# Patient Record
Sex: FEMALE | Race: WHITE | Employment: FULL TIME | URBAN - METROPOLITAN AREA
[De-identification: names, ages, dates, MRNs, and addresses within clinical notes are randomized per-mention and may not be internally consistent; named-entity substitution may affect disease eponyms.]

---

## 2017-02-08 ENCOUNTER — CLINICAL SUPPORT (OUTPATIENT)
Dept: SURGERY | Age: 37
End: 2017-02-08

## 2017-02-08 VITALS — HEIGHT: 63 IN | WEIGHT: 225 LBS | BODY MASS INDEX: 39.87 KG/M2

## 2017-02-08 DIAGNOSIS — E66.01 MORBID OBESITY WITH BMI OF 40.0-44.9, ADULT (HCC): Primary | ICD-10-CM

## 2017-02-08 NOTE — PROGRESS NOTES
Pre-Surgical Multi-Disciplinary Program    Name: Jacklynn Litten   : 1980    Session# 1  Date: 2017     Height: 5' 2.5\" (158.8 cm)    Weight: 102.1 kg (225 lb) lbs. Body mass index is 40.5 kg/(m^2). Dietary Instructions    Reviewed intake  Understanding label reading  Understanding low carbohydrates, low sugar, higher protein meals  Understanding proper portions  Dining outside home  Instruction given for personal dietary changes  Comments: Pt given brief pre/post-op diet ed and diet hx reviewed. Physical Activity/Exercise    Discussed Perceived Compliance  Reasonable Goals Set  Motivation  Comments: Diet history reviewed and personal dietary changes discussed. Behavior Modification    Positive attitude  Comments: Pt is working on the following goals:  1. Keep up with physical activity routine. 2. Start eating more slowly and practice  eating and drinking. 3. Get chewable multivitamin with iron. 4. Start decreasing the carbonated and caffeinated beverages gradually. 5. Always have a protein source at snacks and decrease portion sizes at meals and snacks. Candidate for surgery (per RD): Pending    Dietitian: Sadia Santana RD     Jacklynn Litten is a 43 y.o. female who present for a pre-op evaluation. Visit Vitals    Ht 5' 2.5\" (1.588 m)    Wt 102.1 kg (225 lb)    BMI 40.5 kg/m2     No past medical history on file.       Patient Education and Materials Provided:  Supplement [facility], B Vitamin Information, MVI Recommendations, Calcium Citrate Information, Bariatric Supplement Companies, Protein Supplement Information, Fluid Requirements, No Caffeine or Carbonation, No Alcohol for One Year Post Op, 3 Balanced Meals a Day, Food Group Guide, Good Choices Dining Out, No Snacks, No Concentrated Sweets, Support System at Home, Exercising, Support Group Information and Addressed Current Habits / Changes to make     Reasons for Surgery:  BMI > 40 with one or more medically significant comorbidities    Summary:  Pt given brief pre/post-op diet ed and diet hx reviewed. Pt set several goals. See below. Candidate for surgery:   Pending    Procedure:  laparoscopic sleeve gastrectomy    Nutritional Hx: What is the number of meals you eat per day? 3    Do you eat between meals / snack? yes  Typical snack: junk food (Doritos and chocolate), sometimes fruit, tries to have protein (cheese sticks, yogurt, Premeir Protein shake)    How fast do you eat your meals? fast    How many sodas do you drink per day? 2 cans caffeine free    How many caffeinated drinks do you have per day? 1-3; sometimes soda, coffee    How much water do you drink per day? 20-30oz    How often do you eat fast food? occasionally and 1 times a week    How often do you consume alcohol? never    Diet History:  Breakfast  What are you eating and how much? Cookies and Cream poptart    When? 6:30am   Where? At home   Snacks  What are you eating and how much? Sometimes Belvida cookies   When? ii   Where? iii   Hydration  What are you eating and how much? 2 cups coffee, 40 calories of half & half, 2 splendas   When? ii   Where? ii   Lunch  What are you eating and how much? Homemade chili 1c. With 2T. Shredded cheddar, 1 single serve bag Doritos   When? ii   Where? iii   Snacks  What are you eating and how much? Little Sasha oatmeal cookie pie, banana before walk   When? ii   Where? iii   Hydration  What are you eating and how much? Diet coke   When? ii   Where? iii   Dinner  What are you eating and how much? Steak 8oz and mac & cheese 1c. When? ii   Where? iii   Snacks  What are you eating and how much? icecream 1/2c. When? ii   Where? iii   Hydration  What are you eating and how much? 16oz bottle water   When? ii   Where? iii     Exercise:  Do you currently have an exercise routine? yes  What kind of exercise do you do? Walking outside and goes to gym  . For how long?  30-45 mins For how often? 3-4 days per week    Goals: 1. Keep up with physical activity routine. 2. Start eating more slowly and practice  eating and drinking. 3. Get chewable multivitamin with iron. 4. Start decreasing the carbonated and caffeinated beverages gradually. 5. Always have a protein source at snacks and decrease portion sizes at meals and snacks.

## 2017-02-08 NOTE — PATIENT INSTRUCTIONS
Goals: 1. Keep up with physical activity routine. 2. Start eating more slowly and practice  eating and drinking. 3. Get chewable multivitamin with iron. 4. Start decreasing the carbonated and caffeinated beverages gradually. 5. Always have a protein source at snacks and decrease portion sizes at meals and snacks.

## 2017-02-16 RX ORDER — OMEPRAZOLE 20 MG/1
20 TABLET, DELAYED RELEASE ORAL DAILY
COMMUNITY
End: 2017-04-07

## 2017-02-16 RX ORDER — DEXTROMETHORPHAN HYDROBROMIDE, GUAIFENESIN 5; 100 MG/5ML; MG/5ML
650 LIQUID ORAL
COMMUNITY
End: 2017-04-07

## 2017-02-20 ENCOUNTER — OFFICE VISIT (OUTPATIENT)
Dept: SURGERY | Age: 37
End: 2017-02-20

## 2017-02-20 VITALS
OXYGEN SATURATION: 99 % | DIASTOLIC BLOOD PRESSURE: 74 MMHG | BODY MASS INDEX: 39.69 KG/M2 | WEIGHT: 224 LBS | SYSTOLIC BLOOD PRESSURE: 123 MMHG | HEIGHT: 63 IN | HEART RATE: 96 BPM

## 2017-02-20 DIAGNOSIS — M54.9 CHRONIC BACK PAIN, UNSPECIFIED BACK LOCATION, UNSPECIFIED BACK PAIN LATERALITY: ICD-10-CM

## 2017-02-20 DIAGNOSIS — N92.6 IRREGULAR MENSES: ICD-10-CM

## 2017-02-20 DIAGNOSIS — N39.3 SUI (STRESS URINARY INCONTINENCE, FEMALE): ICD-10-CM

## 2017-02-20 DIAGNOSIS — E66.01 MORBID OBESITY DUE TO EXCESS CALORIES (HCC): ICD-10-CM

## 2017-02-20 DIAGNOSIS — K21.9 GASTROESOPHAGEAL REFLUX DISEASE WITHOUT ESOPHAGITIS: ICD-10-CM

## 2017-02-20 DIAGNOSIS — E66.01 MORBID OBESITY WITH BMI OF 40.0-44.9, ADULT (HCC): Primary | ICD-10-CM

## 2017-02-20 DIAGNOSIS — E28.2 PCOS (POLYCYSTIC OVARIAN SYNDROME): ICD-10-CM

## 2017-02-20 DIAGNOSIS — G89.29 CHRONIC BACK PAIN, UNSPECIFIED BACK LOCATION, UNSPECIFIED BACK PAIN LATERALITY: ICD-10-CM

## 2017-02-20 DIAGNOSIS — G43.809 OTHER TYPE OF MIGRAINE: ICD-10-CM

## 2017-02-20 DIAGNOSIS — Z98.84 HISTORY OF ADJUSTABLE GASTRIC BANDING: ICD-10-CM

## 2017-02-20 NOTE — MR AVS SNAPSHOT
Visit Information Date & Time Provider Department Dept. Phone Encounter #  
 2/20/2017  8:00 AM MD [facility] [phone] [id number] Follow-up Instructions Follow-up and Disposition History Your Appointments 3/8/2017  1:30 PM  
NUTRITION COUNSELING with TSS NUTRI VISIT PEN [facility] ([address]) Appt Note: criteria; r/s UGI at 1pm  
 [address] [place] [address] [place] [address] [address]  
  
   
 [address] [facility] Upcoming Health Maintenance Date Due DTaP/Tdap/Td series (1 - Tdap) 11/8/1999 PAP AKA CERVICAL CYTOLOGY 11/8/1999 INFLUENZA AGE 9 TO ADULT 8/1/2016 Allergies as of 2/20/2017  Review Complete On: 2/20/2017 By: Maritza Burleson MD  
 No Known Allergies Current Immunizations  Never Reviewed No immunizations on file. Not reviewed this visit You Were Diagnosed With   
  
 Codes Comments Morbid obesity with BMI of 40.0-44.9, adult ([facility] [address]    -  Primary ICD-10-CM: E66.01, Z68.41 
ICD-9-CM: 278.01, V85.41 Morbid obesity due to excess calories (HCC)     ICD-10-CM: E66.01 
ICD-9-CM: 278.01 History of adjustable gastric banding     ICD-10-CM: Z59.04 ICD-9-CM: V45.86 Other type of migraine     ICD-10-CM: G43.809 Chronic back pain, unspecified back location, unspecified back pain laterality     ICD-10-CM: M54.9, G89.29 ICD-9-CM: 724.5, 338.29   
 AVELINO (stress urinary incontinence, female)     ICD-10-CM: N39.3 ICD-9-CM: 625.6 PCOS (polycystic ovarian syndrome)     ICD-10-CM: E28.2 ICD-9-CM: 256.4 Irregular menses     ICD-10-CM: N92.6 ICD-9-CM: 626.4 Gastroesophageal reflux disease without esophagitis     ICD-10-CM: K21.9 ICD-9-CM: 530.81 Vitals BP Pulse Height(growth percentile) Weight(growth percentile) SpO2 BMI 123/74 (BP 1 Location: Left arm, BP Patient Position: Sitting) 96 5' 2.5\" (1.588 m) 224 lb (101.6 kg) 99% 40.32 kg/m2 OB Status Smoking Status Having regular periods Never Smoker Vitals History BMI and BSA Data Body Mass Index Body Surface Area  
 40.32 kg/m 2 2.12 m 2 Your Updated Medication List  
  
   
This list is accurate as of: 2/20/17  9:37 AM.  Always use your most recent med list.  
  
  
  
  
 CALCIUM 600 + D 600-125 mg-unit Tab Generic drug:  calcium-cholecalciferol (d3) Take  by mouth. EXCEDRIN MIGRAINE PO Take  by mouth.  
  
 multivitamin with iron chewable tablet Commonly known as:  Gayl How Take 1 Tab by mouth two (2) times a day. PREVACID PO Take  by mouth. PriLOSEC OTC 20 mg tablet Generic drug:  omeprazole Take 20 mg by mouth daily. TUMS PO Take  by mouth. TYLENOL ARTHRITIS PAIN 650 mg CR tablet Generic drug:  acetaminophen Take 650 mg by mouth every six (6) hours as needed for Pain. To-Do List   
 02/20/2017 Lab:  CBC WITH AUTOMATED DIFF   
  
 02/20/2017 Lab:  H. PYLORI ABS, IGG, IGA, IGM   
  
 02/20/2017 Lab:  TSH 3RD GENERATION Patient Instructions Body Mass Index: Care Instructions Your Care Instructions Body mass index (BMI) can help you see if your weight is raising your risk for health problems. It uses a formula to compare how much you weigh with how tall you are. A BMI between 18.5 and 24.9 is considered healthy. A BMI between 25 and 29.9 is considered overweight. A BMI of 30 or higher is considered obese. If your BMI is in the normal range, it means that you have a lower risk for weight-related health problems. If your BMI is in the overweight or obese range, you may be at increased risk for weight-related health problems, such as high blood pressure, heart disease, stroke, arthritis or joint pain, and diabetes. BMI is just one measure of your risk for weight-related health problems. You may be at higher risk for health problems if you are not active, you eat an unhealthy diet, or you drink too much alcohol or use tobacco products. Follow-up care is a key part of your treatment and safety. Be sure to make and go to all appointments, and call your doctor if you are having problems. It's also a good idea to know your test results and keep a list of the medicines you take. How can you care for yourself at home? · Practice healthy eating habits. This includes eating plenty of fruits, vegetables, whole grains, lean protein, and low-fat dairy. · Get at least 30 minutes of exercise 5 days a week or more. Brisk walking is a good choice. You also may want to do other activities, such as running, swimming, cycling, or playing tennis or team sports. · Do not smoke. Smoking can increase your risk for health problems. If you need help quitting, talk to your doctor about stop-smoking programs and medicines. These can increase your chances of quitting for good. · Limit alcohol to 2 drinks a day for men and 1 drink a day for women. Too much alcohol can cause health problems. If you have a BMI higher than 25 · Your doctor may do other tests to check your risk for weight-related health problems. This may include measuring the distance around your waist. A waist measurement of more than 40 inches in men or 35 inches in women can increase the risk of weight-related health problems. · Talk with your doctor about steps you can take to stay healthy or improve your health. You may need to make lifestyle changes to lose weight and stay healthy, such as changing your diet and getting regular exercise. Where can you learn more? Go to http://bentley-gina.info/. Enter S176 in the search box to learn more about \"Body Mass Index: Care Instructions. \" Current as of: February 16, 2016 Content Version: 11.1 © 9234-3289 [employer]. Care instructions adapted under license by [employer] (which disclaims liability or warranty for this information). If you have questions about a medical condition or this instruction, always ask your healthcare professional. [address] any warranty or liability for your use of this information. Introducing [facility]! Nae Micheal introduces [employer] patient portal. Now you can access parts of your medical record, email your doctor's office, and request medication refills online. 1. In your internet browser, go to [email or web address] the [email or web address] 2. Click on the First Time User? Click Here link in the Sign In box. You will see the New Member Sign Up page. 3. Enter your [employer] Access Code exactly as it appears below. You will not need to use this code after youve completed the sign-up process. If you do not sign up before the expiration date, you must request a new code. · [employer] Access Code: [email or web address] Expires: 5/21/2017  8:44 AM 
 
4. Enter the last four digits of your Social Security Number (xxxx) and Date of Birth (mm/dd/yyyy) as indicated and click Submit. You will be taken to the next sign-up page. 5. Create a [employer] ID. This will be your [employer] login ID and cannot be changed, so think of one that is secure and easy to remember. 6. Create a [employer] password. You can change your password at any time. 7. Enter your Password Reset Question and Answer. This can be used at a later time if you forget your password. 8. Enter your e-mail address. You will receive e-mail notification when new information is available in [address] 9. Click Sign Up. You can now view and download portions of your medical record. 10. Click the Download Summary menu link to download a portable copy of your medical information.  
 
If you have questions, please visit the Frequently Asked Questions section of the [employer]. Remember, [employer] is NOT to be used for urgent needs. For medical emergencies, dial 911. Now available from your iPhone and Android! Please provide this summary of care documentation to your next provider. Your primary care clinician is listed as Corey Cabrera. If you have any questions after today's visit, please call [phone].

## 2017-02-20 NOTE — PATIENT INSTRUCTIONS
Body Mass Index: Care Instructions  Your Care Instructions    Body mass index (BMI) can help you see if your weight is raising your risk for health problems. It uses a formula to compare how much you weigh with how tall you are. A BMI between 18.5 and 24.9 is considered healthy. A BMI between 25 and 29.9 is considered overweight. A BMI of 30 or higher is considered obese. If your BMI is in the normal range, it means that you have a lower risk for weight-related health problems. If your BMI is in the overweight or obese range, you may be at increased risk for weight-related health problems, such as high blood pressure, heart disease, stroke, arthritis or joint pain, and diabetes. BMI is just one measure of your risk for weight-related health problems. You may be at higher risk for health problems if you are not active, you eat an unhealthy diet, or you drink too much alcohol or use tobacco products. Follow-up care is a key part of your treatment and safety. Be sure to make and go to all appointments, and call your doctor if you are having problems. It's also a good idea to know your test results and keep a list of the medicines you take. How can you care for yourself at home? · Practice healthy eating habits. This includes eating plenty of fruits, vegetables, whole grains, lean protein, and low-fat dairy. · Get at least 30 minutes of exercise 5 days a week or more. Brisk walking is a good choice. You also may want to do other activities, such as running, swimming, cycling, or playing tennis or team sports. · Do not smoke. Smoking can increase your risk for health problems. If you need help quitting, talk to your doctor about stop-smoking programs and medicines. These can increase your chances of quitting for good. · Limit alcohol to 2 drinks a day for men and 1 drink a day for women. Too much alcohol can cause health problems.   If you have a BMI higher than 25  · Your doctor may do other tests to check your risk for weight-related health problems. This may include measuring the distance around your waist. A waist measurement of more than 40 inches in men or 35 inches in women can increase the risk of weight-related health problems. · Talk with your doctor about steps you can take to stay healthy or improve your health. You may need to make lifestyle changes to lose weight and stay healthy, such as changing your diet and getting regular exercise. Where can you learn more? Go to http://bentley-gina.info/. Enter S176 in the search box to learn more about \"Body Mass Index: Care Instructions. \"  Current as of: February 16, 2016  Content Version: 11.1  © 3284-8467 [employer]. Care instructions adapted under license by [facility] (which disclaims liability or warranty for this information). If you have questions about a medical condition or this instruction, always ask your healthcare professional. [address] any warranty or liability for your use of this information.

## 2017-02-20 NOTE — PROGRESS NOTES
Bariatric Surgery Consultation    Subjective: The patient is a 43 y.o. obese female with a Body mass index is 40.32 kg/(m^2). Jane Grubbs The patient is currently her heaviest weight for the past 3 years. she has been overweight since childhood. she has been considering surgery since having her gastric band removed in [place] in 2013 after two years with no resulted weight loss. she desires surgery at this time because of multiple health concerns and their lifestyle issues which are hindered by their weight. she has been referred by his family physician Dr Hedy Jones for evaluation and treatment of their obesity via surgical intervention. Jacquelene Angelucci has tried multiple diets in her lifetime most recently tried behavior modification and unsupervised diets       Operative report reviewed - date of band removal 5/21/9234 - plication wrap was not taken down (simple band removal only)      Bariatric comorbidities present are   Patient Active Problem List   Diagnosis Code    Morbid obesity with BMI of 40.0-44.9, adult ([address] E66.01, Z68.41    Morbid obesity ([address] E66.01    History of adjustable gastric banding Z98.84    Migraine G43.909    Chronic back pain M54.9, G89.29    AVELINO (stress urinary incontinence, female) N39.3    PCOS (polycystic ovarian syndrome) E28.2    Irregular menses N92.6    GERD (gastroesophageal reflux disease) K21.9    Arthritic-like pain M25.50       The patient is considering laparoscopic sleeve gastrectomy for surgical weight loss due to their ineffective progress with medical forms of weight loss and the urging of their physician who cares for their primary medical issues. The patient  now presents  for consideration for weight loss surgery understanding the benefits of this over a medical approach of weight loss as was discussed in our presentation on weight loss surgery.  They have discussed their plans both with their family and primary care physician who is in support of their pursuit of such. The patient has had no health issues as of late and denies and gastrointestinal disturbances other than what is outlined below in their review of symptoms. All of their prior evaluations available by both their PCP's and specialists physicians have been reviewed today either in the Care Everywhere portal or scanned under the media tab. I have spent a large portion of my initial consultation today reviewing the patients current dietary habits which have contributed to their health issues and obesity. I have suggested to them personally a dietary regimen that they can initiate now to help with their status as it pertains to their weight. They understand that the most important aspect of their journey through their weight loss endeavor will be their adherence to a new lifestyle of healthy eating behavior. They also understand that an adherence to an exercise program will not only help with weight loss but is ultimately important in weight maintenance. The patients goal weight is 150 lb. These goals are consistent with expected outcomes of their desired operation. her Medical goals are resolution of these health issues.         Patient Active Problem List    Diagnosis Date Noted    Morbid obesity with BMI of 40.0-44.9, adult ([address]     Morbid obesity ([address]     History of adjustable gastric banding     Migraine     Chronic back pain     AVELINO (stress urinary incontinence, female)     PCOS (polycystic ovarian syndrome)     Irregular menses     GERD (gastroesophageal reflux disease)     Arthritic-like pain      Past Surgical History   Procedure Laterality Date    Hx tubal ligation      Hx gi  2011     placment of gastric band in [address] [place] [address] Hx gi  2013     removal of gastric band in [place] (not original surgeon)    Hx heent       mulitple tympanic membrane surgeries as a child    Hx tonsil and adenoidectomy        Social History   Substance Use Topics    Smoking status: Never Smoker    Smokeless tobacco: Not on file    Alcohol use No      Family History   Problem Relation Age of Onset    Arthritis-osteo Mother     Arthritis-osteo Father     Coronary Artery Disease Father       Current Outpatient Prescriptions   Medication Sig Dispense Refill    ASPIRIN/ACETAMINOPHEN/CAFFEINE (EXCEDRIN MIGRAINE PO) Take  by mouth.  calcium-cholecalciferol, d3, (CALCIUM 600 + D) 600-125 mg-unit tab Take  by mouth.  CALCIUM CARBONATE (TUMS PO) Take  by mouth.  multivitamin with iron (FLINTSTONES) chewable tablet Take 1 Tab by mouth two (2) times a day.  acetaminophen (TYLENOL ARTHRITIS PAIN) 650 mg CR tablet Take 650 mg by mouth every six (6) hours as needed for Pain.  omeprazole (PRILOSEC OTC) 20 mg tablet Take 20 mg by mouth daily.  LANSOPRAZOLE (PREVACID PO) Take  by mouth.        No Known Allergies       Review of Systems:     General - No history or complaints of unexpected fever, chills, or weight loss  Head/Neck - No history or complaints of headache, diplopia, dysphagia, hearing loss  Cardiac - No history or complaints of chest pain, palpitations, murmur, or shortness of breath  Pulmonary - No history or complaints of shortness of breath, productive cough, hemoptysis  Gastrointestinal - (+) reflux,no  abdominal pain, obstipation/constipation or blood per rectum  Genitourinary - No history or complaints of hematuria/dysuria, stress urinary incontinence symptoms, or renal lithiasis  Musculoskeletal - moderate joint pain in their lower back,  no muscular weakness  Hematologic - No history or complaints of bleeding disorders,  No blood transfusions  Neurologic - No history or complaints of  migraine headaches, seizure activity, syncopal episodes, TIA or stroke  Integumentary - No history or complaints of rashes, abnormal nevi, skin cancer  Gynecological - irregular menses due to PCOS    Objective:     Visit Vitals    /74 (BP 1 Location: Left arm, BP Patient Position: Sitting)    Pulse 96    Ht 5' 2.5\" (1.588 m)    Wt 101.6 kg (224 lb)    SpO2 99%    BMI 40.32 kg/m2       Physical Examination: General appearance - alert, well appearing, and in no distress and oriented to person, place, and time  Mental status - alert, oriented to person, place, and time, normal mood, behavior, speech, dress, motor activity, and thought processes  Eyes - pupils equal and reactive, extraocular eye movements intact, sclera anicteric, left eye normal, right eye normal  Ears - right ear normal, left ear normal  Nose - normal and patent, no erythema, discharge or polyps and normal nontender sinuses  Mouth - mucous membranes moist, pharynx normal without lesions  Neck - supple, no significant adenopathy  Lymphatics - no palpable lymphadenopathy, no hepatosplenomegaly  Chest - clear to auscultation, no wheezes, rales or rhonchi, symmetric air entry  Heart - normal rate, regular rhythm, normal S1, S2, no murmurs, rubs, clicks or gallops  Abdomen - soft, nontender, nondistended, no masses or organomegaly  Back exam - full range of motion, no tenderness, palpable spasm or pain on motion  Neurological - alert, oriented, normal speech, no focal findings or movement disorder noted  Musculoskeletal - no joint tenderness, deformity or swelling  Extremities - peripheral pulses normal, no pedal edema, no clubbing or cyanosis  Skin - normal coloration and turgor, no rashes, no suspicious skin lesions noted    Labs:       No results found for this or any previous visit (from the past 1440 hour(s)). Assessment:     Morbid obesity with comorbidity    Plan:     laparoscopic sleeve gastrectomy    This is a 43 y.o. female with a BMI of Body mass index is 40.32 kg/(m^2). and the weight-related co-morbidties as noted below. Savita Schaffer meets the NIH criteria for bariatric surgery based upon the BMI of Body mass index is 40.32 kg/(m^2). and multiple weight-related co-morbidties.  Savita Schaffer has elected laparoscopic sleeve gastrectomy as her intervention of choice for treatment of morbid obestiy through surgical means secondary to its safety profile, rapid return to work  and decreases in operative risks over gastric bypass. In the office today, following Yadira's history and physical examination, a 30 minute discussion regarding the anatomic alterations for the laparoscopic sleeve gastrectomy was undertaken. The dietary expectations and the patient and physician dependent factors for success were thoroughly discussed, to include the need for interval follow-up and long-term dietary changes associated with success. The possible complications of the sleeve gastrectomy  were also discussed, to include;death, DVT/PE, staple line leak, bleeding, stricture formation, infection, nutritional deficiencies and sleeve dilation. Specific weight related outcomes for success were also discussed with an emphasis on careful and close follow-up with the first year and eating behavior modification as the baseline and cyclical hunger return. The patient expressed an understanding of the above factors, and her questions were answered in their entirety. In addition, the patient attended a 1.5 hour power point seminar regarding obesity, surgical weight loss including, adjustable gastric band, gastric bypass, and sleeve gastrectomy. This discussion contrasted the different surgical techniques, mechanisms of actions and expected outcomes, and surgical and medical risks associated with each procedure. During this seminar, there was a long question and answer session where each questions was answered until there were no additional questions. Today, the patient had all of her questions answered and desires to proceed with  bariatric surgery initially choosing sleeve gastrectomy as her surgical option.     She understands that proceeding with a conversion to a sleeve resection is considered revision surgery and as such the above mentioned risks are elevated. Secondary Diagnoses:     Dietary Intervention  - The patient is currently scheduled to see or has been followed by a bariatric nutritionist for an attempt at preoperative weight loss as has been dictated by their insurance carrier. They will be assessed at various times during their follow up to evaluate their progress depending on the length of time that is required once again by their carrier. I have explained the importance of preoperative weight loss and the benefits regarding lower surgical risk and also assisting the patient in reaching their weight loss goal.  Finally they understand their is a physiologic benefit from the standpoint of hepatic volume reduction preoperatively.   I have reiterated the importance of a low carbohydrate and high protein regimen to achieve their stated goal.    Polycystic Ovarian Syndrome -The patient does understand the association between obesity and the development of PCOS.  They understand that weight loss can often improve symptoms and in many cases cure the disease.  If the disease is associated with infertility this too is often corrected with adequate weight loss.  The patient understands that any change to the pharmaceutical treatment of her PCOS will be managed by the primary care physician or OB/GYN    Weight Related Arthritis -The patient understands the benefits that weight loss surgery can have on their arthritis but also understands that weight loss is not a guaranteed cure and relief of symptoms is often dependent on the severity of the underlying disease.  The patient also understands that traditional pharmaceutical treatments for this diagnosis are usually unavailable to post-operative weight loss patients due to the effects on the gastrointestinal tract particularly with the gastric bypass and to a lesser effect with the sleeve gastrectomy.  Any changes to the patients medication treatment will ultimately be made the patients PCP with input by our office. GERD -The patient understands that weight loss surgery is not a guaranteed cure for reflux disease but does understand the benefits that weight loss can have on reflux disease.  They also understand that at the time of surgery the gastroesophageal junction will be evaluated for the presence of a diaphragmatic hernia.  Hernias will be corrected always with the gastric band and sleeve gastrectomy procedures, but only on a case by case basis with the gastric bypass if it prevents our ability to perform the operation at hand, or if I feel that they would benefit long term with correction of this issue.  The patient also understands that neither weight loss surgery nor repair of a diaphragmatic hernia repair guarantees the complete cessation of the disease. They also understand there is a possibility of recurrence with a simple crural repair as is performed with these procedures. They understand they may have to continue their medications in the postoperative period. They have a good understanding that the gastric bypass procedure is better suited to total resolution of this issue and that neither the Lap Band nor sleeve gastrectomy is considered a curative procedure as it pertains to this diagnosis.         Signed By: Bhavna Sandoval MD     February 20, 2017

## 2017-03-08 ENCOUNTER — HOSPITAL ENCOUNTER (OUTPATIENT)
Dept: LAB | Age: 37
Discharge: HOME | End: 2017-03-08
Payer: COMMERCIAL

## 2017-03-08 ENCOUNTER — HOSPITAL ENCOUNTER (OUTPATIENT)
Age: 37
Setting detail: OUTPATIENT SURGERY
Discharge: HOME | End: 2017-03-08
Attending: SPECIALIST | Admitting: SPECIALIST
Payer: COMMERCIAL

## 2017-03-08 ENCOUNTER — CLINICAL SUPPORT (OUTPATIENT)
Dept: SURGERY | Age: 37
End: 2017-03-08

## 2017-03-08 ENCOUNTER — APPOINTMENT (OUTPATIENT)
Dept: GENERAL RADIOLOGY | Age: 37
End: 2017-03-08
Attending: SPECIALIST
Payer: COMMERCIAL

## 2017-03-08 VITALS
OXYGEN SATURATION: 100 % | RESPIRATION RATE: 18 BRPM | BODY MASS INDEX: 41.96 KG/M2 | SYSTOLIC BLOOD PRESSURE: 117 MMHG | WEIGHT: 228 LBS | DIASTOLIC BLOOD PRESSURE: 78 MMHG | HEART RATE: 75 BPM | TEMPERATURE: 98.8 F | HEIGHT: 62 IN

## 2017-03-08 DIAGNOSIS — E66.01 MORBID OBESITY (HCC): ICD-10-CM

## 2017-03-08 DIAGNOSIS — K21.9 GASTROESOPHAGEAL REFLUX DISEASE WITHOUT ESOPHAGITIS: ICD-10-CM

## 2017-03-08 DIAGNOSIS — N92.6 IRREGULAR MENSES: ICD-10-CM

## 2017-03-08 DIAGNOSIS — E66.01 MORBID OBESITY WITH BMI OF 40.0-44.9, ADULT (HCC): Primary | ICD-10-CM

## 2017-03-08 DIAGNOSIS — E66.01 MORBID OBESITY WITH BMI OF 40.0-44.9, ADULT (HCC): ICD-10-CM

## 2017-03-08 LAB
BASOPHILS # BLD AUTO: 0 K/UL (ref 0–0.06)
BASOPHILS # BLD: 0 % (ref 0–2)
DIFFERENTIAL METHOD BLD: ABNORMAL
EOSINOPHIL # BLD: 0.1 K/UL (ref 0–0.4)
EOSINOPHIL NFR BLD: 1 % (ref 0–5)
ERYTHROCYTE [DISTWIDTH] IN BLOOD BY AUTOMATED COUNT: 13.9 % (ref 11.6–14.5)
HCT VFR BLD AUTO: 35.9 % (ref 35–45)
HGB BLD-MCNC: 11.9 G/DL (ref 12–16)
LYMPHOCYTES # BLD AUTO: 27 % (ref 21–52)
LYMPHOCYTES # BLD: 2.3 K/UL (ref 0.9–3.6)
MCH RBC QN AUTO: 28.9 PG (ref 24–34)
MCHC RBC AUTO-ENTMCNC: 33.1 G/DL (ref 31–37)
MCV RBC AUTO: 87.1 FL (ref 74–97)
MONOCYTES # BLD: 0.7 K/UL (ref 0.05–1.2)
MONOCYTES NFR BLD AUTO: 8 % (ref 3–10)
NEUTS SEG # BLD: 5.4 K/UL (ref 1.8–8)
NEUTS SEG NFR BLD AUTO: 64 % (ref 40–73)
PLATELET # BLD AUTO: 261 K/UL (ref 135–420)
PMV BLD AUTO: 10.2 FL (ref 9.2–11.8)
RBC # BLD AUTO: 4.12 M/UL (ref 4.2–5.3)
TSH SERPL DL<=0.05 MIU/L-ACNC: 1.38 UIU/ML (ref 0.36–3.74)
WBC # BLD AUTO: 8.5 K/UL (ref 4.6–13.2)

## 2017-03-08 PROCEDURE — 74240 X-RAY XM UPR GI TRC 1CNTRST: CPT

## 2017-03-08 PROCEDURE — 76040000019: Performed by: SPECIALIST

## 2017-03-08 PROCEDURE — 74011000255 HC RX REV CODE- 255: Performed by: SPECIALIST

## 2017-03-08 NOTE — IP AVS SNAPSHOT
[address] [place] [id number] 
[phone] Patient: Jalen Boudreaux MRN: [id number] :1980 You are allergic to the following No active allergies Recent Documentation Height Weight BMI OB Status Smoking Status 1.575 m 103.4 kg 41.7 kg/m2 Having regular periods Never Smoker Emergency Contacts Name Discharge Info Relation Home Work Mobile Hitesh Chew  Spouse [3] [phone] About your hospitalization You were admitted on:  2017 You last received care in the:  [facility] ENDOSCOPY You were discharged on:  2017 Unit phone number:  [phone] Why you were hospitalized Your primary diagnosis was:  Not on File Providers Seen During Your Hospitalizations Provider Role Specialty Primary office phone Jamie Dowd MD Attending Provider General Surgery [phone] Your Primary Care Physician (PCP) Primary Care Physician Office Phone Office Fax Xi Pamellaamos, Lynnette [facility] Umer Mccoy [phone] Follow-up Information None Your Appointments 2017  1:00 PM EDT  
EDUCATION CLASS with TSS NUTR VISIT2 [facility] Vicki Mediate ([facility])  
 [address] [place] [address]  
[phone] Current Discharge Medication List  
  
ASK your doctor about these medications Dose & Instructions Dispensing Information Comments Morning Noon Evening Bedtime CALCIUM 600 + D 600-125 mg-unit Tab Generic drug:  calcium-cholecalciferol (d3) Your next dose is: Today, Tomorrow Other:  _________ Take  by mouth. Refills:  0 EXCEDRIN MIGRAINE PO Your next dose is: Today, Tomorrow Other:  _________ Take  by mouth. Refills:  0 multivitamin with iron chewable tablet Commonly known as:  Saralee Leak Your next dose is: Today, Tomorrow Other:  _________ Dose:  1 Tab Take 1 Tab by mouth two (2) times a day. Refills:  0 PREVACID PO Your next dose is: Today, Tomorrow Other:  _________ Take  by mouth. Refills:  0 PriLOSEC OTC 20 mg tablet Generic drug:  omeprazole Your next dose is: Today, Tomorrow Other:  _________ Dose:  20 mg Take 20 mg by mouth daily. Refills:  0  
     
   
   
   
  
 TUMS PO Your next dose is: Today, Tomorrow Other:  _________ Take  by mouth. Refills:  0  
     
   
   
   
  
 TYLENOL ARTHRITIS PAIN 650 mg CR tablet Generic drug:  acetaminophen Your next dose is: Today, Tomorrow Other:  _________ Dose:  650 mg Take 650 mg by mouth every six (6) hours as needed for Pain. Refills:  0 Discharge Instructions Verbal and written post adjustment / UGI instructions given. Patient acknowledges understanding. Discussed diet, activities, and s/s that should be reported. Encouraged to call to schedule next appointment and to call with any questions or concerns. Discharge Orders None Introducing [facility]! [facility] introduces [employer] patient portal. Now you can access parts of your medical record, email your doctor's office, and request medication refills online. 1. In your internet browser, go to [email or web address] 2. Click on the First Time User? Click Here link in the Sign In box. You will see the New Member Sign Up page. 3. Enter your [employer] Access Code exactly as it appears below. You will not need to use this code after youve completed the sign-up process. If you do not sign up before the expiration date, you must request a new code. · [employer] Access Code: [email or web address] Expires: 5/21/2017  8:44 AM 
 
4. Enter the last four digits of your Social Security Number (xxxx) and Date of Birth (mm/dd/yyyy) as indicated and click Submit. You will be taken to the next sign-up page. 5. Create a [employer] ID. This will be your [employer] login ID and cannot be changed, so think of one that is secure and easy to remember. 6. Create a [employer] password. You can change your password at any time. 7. Enter your Password Reset Question and Answer. This can be used at a later time if you forget your password. 8. Enter your e-mail address. You will receive e-mail notification when new information is available in [address] 9. Click Sign Up. You can now view and download portions of your medical record. 10. Click the Download Summary menu link to download a portable copy of your medical information. If you have questions, please visit the Frequently Asked Questions section of the [employer] website. Remember, [employer] is NOT to be used for urgent needs. For medical emergencies, dial 911. Now available from your iPhone and Android! General Information Please provide this summary of care documentation to your next provider. Patient Signature:  ____________________________________________________________ Date:  ____________________________________________________________  
  
Jame Newcomer Provider Signature:  ____________________________________________________________ Date:  ____________________________________________________________

## 2017-03-08 NOTE — PROCEDURES
Gastric band removed in [place] 8/2013 (simple removal - plication not taken down) seeking conversion to sleeve - see dictation.

## 2017-03-08 NOTE — IP AVS SNAPSHOT
Current Discharge Medication List  
  
ASK your doctor about these medications Dose & Instructions Dispensing Information Comments Morning Noon Evening Bedtime CALCIUM 600 + D 600-125 mg-unit Tab Generic drug:  calcium-cholecalciferol (d3) Your next dose is: Today, Tomorrow Other:  ____________ Take  by mouth. Refills:  0 EXCEDRIN MIGRAINE PO Your next dose is: Today, Tomorrow Other:  ____________ Take  by mouth. Refills:  0  
     
   
   
   
  
 multivitamin with iron chewable tablet Commonly known as:  Arnette Sumner Your next dose is: Today, Tomorrow Other:  ____________ Dose:  1 Tab Take 1 Tab by mouth two (2) times a day. Refills:  0 PREVACID PO Your next dose is: Today, Tomorrow Other:  ____________ Take  by mouth. Refills:  0 PriLOSEC OTC 20 mg tablet Generic drug:  omeprazole Your next dose is: Today, Tomorrow Other:  ____________ Dose:  20 mg Take 20 mg by mouth daily. Refills:  0  
     
   
   
   
  
 TUMS PO Your next dose is: Today, Tomorrow Other:  ____________ Take  by mouth. Refills:  0  
     
   
   
   
  
 TYLENOL ARTHRITIS PAIN 650 mg CR tablet Generic drug:  acetaminophen Your next dose is: Today, Tomorrow Other:  ____________ Dose:  650 mg Take 650 mg by mouth every six (6) hours as needed for Pain. Refills:  0

## 2017-03-09 LAB
H PYLORI IGA SER-ACNC: <9 UNITS (ref 0–8.9)
H PYLORI IGG SER IA-ACNC: <0.9 U/ML (ref 0–0.8)
H PYLORI IGM SER-ACNC: <9 UNITS (ref 0–8.9)

## 2017-03-12 NOTE — OP NOTES
[address] [place] [address]  OPERATIVE REPORT    Name:  Mason Jc  MR#:  [id number]  :  1980  Account #:  [de-identified]  Date of Adm:  2017  Date of Surgery:  2017      PREOPERATIVE DIAGNOSIS: This patient is status post gastric band  removal in [place] in  with persistent morbid obesity for  possible conversion procedure. POSTOPERATIVE DIAGNOSIS: This patient is status post gastric  band removal in [place] in  with persistent morbid obesity  for possible conversion procedure with abnormal proximal stomach of  unclear etiology, possible stricture at prior lap band. PROCEDURE PERFORMED: Upper gastrointestinal study with  barium. ESTIMATED BLOOD LOSS: None. SPECIMENS REMOVED: None. ANESTHESIA: None. STATEMENT ASSESSMENT: The patient is a 47-year-old female who  had a gastric band placed in [place] in the past. She ultimately  had the band removed for chronic dysphagia and what she was told  was an early slip. She finally presented to us in consultation for  possible conversion procedure to another operation due to persistent  morbid obesity. She is here today for upper GI study to assess her  prior operation and to see whether or not her gastric anatomy is  normal. Of note is the fact that we did obtain a copy of her Lap-Band  removal operative report and the surgeon there in [place] did  not take down her plication from her Lap-Band. PROCEDURE: The patient was brought to the fluoroscopy unit,  received ***barium of swallow barium. She was noted to have normal  peristalsis of her esophagus. She had *** in the distal esophagus with  tapering into and through the gastroesophageal junction. Contrast then  pooled in what appeared to be the proximal stomach. There was  considerable delay of contrast at that level in the cardia of the stomach  with essentially no progression for several minutes past a certain point.   Finally with additional sips of barium, we were able to get some  contrast to enter the body and then the prepyloric region of the  stomach, but definitely there was some abnormality of her proximal  stomach. It is unclear as to whether or not there is a stricture at the  prior gastric band placement site or if this is simply the plication that we  are seeing or it could be that she had a significant slip and we are  seeing abnormality related to that. In any case, we will need to perform  an endoscopy on the patient to better delineate her anatomy.         Claribel Malin MD    AT / TB  D:  03/11/2017   09:08  T:  03/11/2017   09:51  Job #:  [id number]

## 2017-03-13 NOTE — OP NOTES
[address] [place] [address]  OPERATIVE REPORT    Name:  Elena Damon  MR#:  [id number]  :  1980  Account #:  [de-identified]  Date of Adm:  2017  Date of Surgery:  2017      AMENDED REPORT:  Revised CSN 2017/seng        PREOPERATIVE DIAGNOSIS: This patient is status post gastric band  removal in [place] in  with persistent morbid obesity for  possible conversion procedure. POSTOPERATIVE DIAGNOSIS: This patient is status post gastric  band removal in [place] in  with persistent morbid obesity  for possible conversion procedure with abnormal proximal stomach of  unclear etiology, possible stricture at prior lap band. PROCEDURE PERFORMED: Upper gastrointestinal study with  barium. ESTIMATED BLOOD LOSS: None. SPECIMENS REMOVED: None. ANESTHESIA: None. STATEMENT ASSESSMENT: The patient is a 52-year-old female who  had a gastric band placed in [place] in the past. She ultimately  had the band removed for chronic dysphagia and what she was told  was an early slip. She finally presented to us in consultation for  possible conversion procedure to another operation due to persistent  morbid obesity. She is here today for upper GI study to assess her  prior operation and to see whether or not her gastric anatomy is  normal. Of note is the fact that we did obtain a copy of her Lap-Band  removal operative report and the surgeon there in [place] did  not take down her plication from her Lap-Band. PROCEDURE: The patient was brought to the fluoroscopy unit. After   swallow barium during the exam, she was noted to have normal  peristalsis of her esophagus. She had filling of the distal esophagus it  tapered into and through the gastroesophageal junction. Contrast then  pooled in what appeared to be the proximal stomach.  There was  considerable delay of contrast at that level in the cardia of the stomach  with essentially no progression for several minutes past a certain point. Finally with additional sips of barium, we were able to get some  contrast to enter the body and then the prepyloric region of the  stomach, but definitely there was some abnormality of her proximal  stomach. It is unclear as to whether or not there is a stricture at the  prior gastric band placement site or if this is simply the plication that we  are seeing or it could be that she had a significant slip and we are  seeing abnormality related to that. In any case, we will need to perform  an endoscopy on the patient to better delineate her anatomy.         Claribel Malin MD    AT / TB  D:  03/11/2017   09:08  T:  03/11/2017   09:51  Job #:  [id number]

## 2017-03-17 ENCOUNTER — HOSPITAL ENCOUNTER (OUTPATIENT)
Age: 37
Setting detail: OUTPATIENT SURGERY
Discharge: HOME | End: 2017-03-17
Attending: SPECIALIST | Admitting: SPECIALIST
Payer: COMMERCIAL

## 2017-03-17 ENCOUNTER — SURGERY (OUTPATIENT)
Age: 37
End: 2017-03-17

## 2017-03-17 VITALS
OXYGEN SATURATION: 99 % | HEART RATE: 86 BPM | BODY MASS INDEX: 41.96 KG/M2 | TEMPERATURE: 97.8 F | SYSTOLIC BLOOD PRESSURE: 135 MMHG | HEIGHT: 62 IN | RESPIRATION RATE: 16 BRPM | DIASTOLIC BLOOD PRESSURE: 72 MMHG | WEIGHT: 228 LBS

## 2017-03-17 LAB — HCG UR QL: NEGATIVE

## 2017-03-17 PROCEDURE — 88305 TISSUE EXAM BY PATHOLOGIST: CPT | Performed by: SPECIALIST

## 2017-03-17 PROCEDURE — 81025 URINE PREGNANCY TEST: CPT

## 2017-03-17 PROCEDURE — 88342 IMHCHEM/IMCYTCHM 1ST ANTB: CPT | Performed by: SPECIALIST

## 2017-03-17 PROCEDURE — 99152 MOD SED SAME PHYS/QHP 5/>YRS: CPT

## 2017-03-17 PROCEDURE — 74011250636 HC RX REV CODE- 250/636

## 2017-03-17 PROCEDURE — 77030009426 HC FCPS BIOP ENDOSC BSC -B: Performed by: SPECIALIST

## 2017-03-17 PROCEDURE — 76040000019: Performed by: SPECIALIST

## 2017-03-17 PROCEDURE — 74011000250 HC RX REV CODE- 250: Performed by: SPECIALIST

## 2017-03-17 PROCEDURE — 77030020263 HC SOL INJ SOD CL0.9% LFCR 1000ML: Performed by: SPECIALIST

## 2017-03-17 PROCEDURE — 74011250636 HC RX REV CODE- 250/636: Performed by: SPECIALIST

## 2017-03-17 RX ORDER — DEXTROMETHORPHAN/PSEUDOEPHED 2.5-7.5/.8
1.2 DROPS ORAL
Status: CANCELLED | OUTPATIENT
Start: 2017-03-17

## 2017-03-17 RX ORDER — SODIUM CHLORIDE 0.9 % (FLUSH) 0.9 %
5-10 SYRINGE (ML) INJECTION EVERY 8 HOURS
Status: CANCELLED | OUTPATIENT
Start: 2017-03-17 | End: 2017-03-17

## 2017-03-17 RX ORDER — SODIUM CHLORIDE 9 MG/ML
100 INJECTION, SOLUTION INTRAVENOUS CONTINUOUS
Status: DISCONTINUED | OUTPATIENT
Start: 2017-03-17 | End: 2017-03-17 | Stop reason: HOSPADM

## 2017-03-17 RX ORDER — EPINEPHRINE 0.1 MG/ML
1 INJECTION INTRACARDIAC; INTRAVENOUS
Status: CANCELLED | OUTPATIENT
Start: 2017-03-17 | End: 2017-03-17

## 2017-03-17 RX ORDER — SODIUM CHLORIDE 0.9 % (FLUSH) 0.9 %
5-10 SYRINGE (ML) INJECTION AS NEEDED
Status: CANCELLED | OUTPATIENT
Start: 2017-03-17 | End: 2017-03-17

## 2017-03-17 RX ORDER — ATROPINE SULFATE 0.1 MG/ML
0.5 INJECTION INTRAVENOUS
Status: CANCELLED | OUTPATIENT
Start: 2017-03-17 | End: 2017-03-17

## 2017-03-17 RX ORDER — FENTANYL CITRATE 50 UG/ML
100 INJECTION, SOLUTION INTRAMUSCULAR; INTRAVENOUS
Status: DISCONTINUED | OUTPATIENT
Start: 2017-03-17 | End: 2017-03-17 | Stop reason: HOSPADM

## 2017-03-17 RX ORDER — MIDAZOLAM HYDROCHLORIDE 1 MG/ML
.25-5 INJECTION, SOLUTION INTRAMUSCULAR; INTRAVENOUS
Status: DISCONTINUED | OUTPATIENT
Start: 2017-03-17 | End: 2017-03-17 | Stop reason: HOSPADM

## 2017-03-17 RX ORDER — NALOXONE HYDROCHLORIDE 0.4 MG/ML
0.4 INJECTION, SOLUTION INTRAMUSCULAR; INTRAVENOUS; SUBCUTANEOUS
Status: DISCONTINUED | OUTPATIENT
Start: 2017-03-17 | End: 2017-03-17 | Stop reason: HOSPADM

## 2017-03-17 RX ORDER — FLUMAZENIL 0.1 MG/ML
0.2 INJECTION INTRAVENOUS
Status: DISCONTINUED | OUTPATIENT
Start: 2017-03-17 | End: 2017-03-17 | Stop reason: HOSPADM

## 2017-03-17 RX ADMIN — FENTANYL CITRATE 100 MCG: 50 INJECTION, SOLUTION INTRAMUSCULAR; INTRAVENOUS at 14:22

## 2017-03-17 RX ADMIN — MIDAZOLAM HYDROCHLORIDE 1 MG: 1 INJECTION, SOLUTION INTRAMUSCULAR; INTRAVENOUS at 14:29

## 2017-03-17 RX ADMIN — MIDAZOLAM HYDROCHLORIDE 1 MG: 1 INJECTION, SOLUTION INTRAMUSCULAR; INTRAVENOUS at 14:37

## 2017-03-17 RX ADMIN — MIDAZOLAM HYDROCHLORIDE 1 MG: 1 INJECTION, SOLUTION INTRAMUSCULAR; INTRAVENOUS at 14:24

## 2017-03-17 RX ADMIN — SODIUM CHLORIDE 100 ML/HR: 900 INJECTION, SOLUTION INTRAVENOUS at 13:52

## 2017-03-17 RX ADMIN — MIDAZOLAM HYDROCHLORIDE 1 MG: 1 INJECTION, SOLUTION INTRAMUSCULAR; INTRAVENOUS at 14:27

## 2017-03-17 RX ADMIN — BENZOCAINE 2 SPRAY: 220 SPRAY, METERED PERIODONTAL at 14:20

## 2017-03-17 RX ADMIN — MIDAZOLAM HYDROCHLORIDE 3 MG: 1 INJECTION, SOLUTION INTRAMUSCULAR; INTRAVENOUS at 14:22

## 2017-03-17 RX ADMIN — MIDAZOLAM HYDROCHLORIDE 1 MG: 1 INJECTION, SOLUTION INTRAMUSCULAR; INTRAVENOUS at 14:25

## 2017-03-17 NOTE — DISCHARGE INSTRUCTIONS
Aisha Ramon  [id number]  1980    EGD DISCHARGE INSTRUCTIONS    Discomfort:  Sore throat- throat lozenges or warm salt water gargle  redness at IV site- apply warm compress to area; if redness or soreness persist- contact your physician  Gaseous discomfort- walking, belching will help relieve any discomfort  You should not operate a vehicle for 12 hours  You should note engage in an occupation involving machinery or appliances for rest of today  You may note drink alcoholic beverages for at least 12 hours  Avoid making any critical decisions for at least 24 hour  DIET  You may resume your regular diet - however -  remember your colon is empty and a heavy meal will produce gas. Avoid these foods:  vegetables, fried / greasy foods, carbonated drinks    ACTIVITY  You may resume your normal daily activities   Spend the remainder of the day resting -  avoid any strenuous activity. CALL M.D. ANY SIGN OF   Increasing pain, nausea, vomiting  Abdominal distension (swelling)  New increased bleeding (oral or rectal)  Fever (chills)  Pain in chest area  Bloody discharge from nose or mouth  Shortness of breath       Follow-up Instructions:   Dr Lisa Parker will call you in one to two weeks. If you do not hear from him, please call his office [phone].                   Aisha Blancopati  [id number]  1980        DISCHARGE SUMMARY from Nurse    The following personal items collected during your admission are returned to you:   Dental Appliance: Dental Appliances: None  Vision: Visual Aid: Glasses  Hearing Aid:    Jewelry:    Clothing:    Other Valuables:    Valuables sent to safe:      PATIENT INSTRUCTIONS:    Take Home Medications:        DISCHARGE SUMMARY from Nurse    The following personal items are in your possession at time of discharge:    Dental Appliances: None  Visual Aid: Glasses                            PATIENT INSTRUCTIONS:    After general anesthesia or intravenous sedation, for 24 hours or while taking prescription Narcotics:  · Limit your activities  · Do not drive and operate hazardous machinery  · Do not make important personal or business decisions  · Do  not drink alcoholic beverages  · If you have not urinated within 8 hours after discharge, please contact your surgeon on call. Report the following to your surgeon:  · Excessive pain, swelling, redness or odor of or around the surgical area  · Temperature over 100.5  · Nausea and vomiting lasting longer than 4 hours or if unable to take medications  · Any signs of decreased circulation or nerve impairment to extremity: change in color, persistent  numbness, tingling, coldness or increase pain  · Any questions        What to do at Home:  Recommended activity: Activity as tolerated and no driving for today,     If you experience any of the following symptoms as above, please follow up with DR Marceol Soto. *  Please give a list of your current medications to your Primary Care Provider. *  Please update this list whenever your medications are discontinued, doses are      changed, or new medications (including over-the-counter products) are added. *  Please carry medication information at all times in case of emergency situations. These are general instructions for a healthy lifestyle:    No smoking/ No tobacco products/ Avoid exposure to second hand smoke    Surgeon General's Warning:  Quitting smoking now greatly reduces serious risk to your health. Obesity, smoking, and sedentary lifestyle greatly increases your risk for illness    A healthy diet, regular physical exercise & weight monitoring are important for maintaining a healthy lifestyle    You may be retaining fluid if you have a history of heart failure or if you experience any of the following symptoms:  Weight gain of 3 pounds or more overnight or 5 pounds in a week, increased swelling in our hands or feet or shortness of breath while lying flat in bed.   Please call your doctor as soon as you notice any of these symptoms; do not wait until your next office visit. Recognize signs and symptoms of STROKE:    F-face looks uneven    A-arms unable to move or move unevenly    S-speech slurred or non-existent    T-time-call 911 as soon as signs and symptoms begin-DO NOT go       Back to bed or wait to see if you get better-TIME IS BRAIN. Warning Signs of HEART ATTACK     Call 911 if you have these symptoms:   Chest discomfort. Most heart attacks involve discomfort in the center of the chest that lasts more than a few minutes, or that goes away and comes back. It can feel like uncomfortable pressure, squeezing, fullness, or pain.  Discomfort in other areas of the upper body. Symptoms can include pain or discomfort in one or both arms, the back, neck, jaw, or stomach.  Shortness of breath with or without chest discomfort.  Other signs may include breaking out in a cold sweat, nausea, or lightheadedness. Don't wait more than five minutes to call 911 - MINUTES MATTER! Fast action can save your life. Calling 911 is almost always the fastest way to get lifesaving treatment. Emergency Medical Services staff can begin treatment when they arrive -- up to an hour sooner than if someone gets to the hospital by car. Patient armband removed and shredded    The discharge information has been reviewed with the patient and spouse. The patient and spouse verbalized understanding. Discharge medications reviewed with the patient and spouse and appropriate educational materials and side effects teaching were provided.

## 2017-03-17 NOTE — H&P
EGD - History and Physical     Subjective:      The patient is a 43 y.o. obese female with a Body mass index is 40.32 kg/(m^2). Nam Srinivasan The patient is currently her heaviest weight for the past 3 years. she has been overweight since childhood. she has been considering surgery since having her gastric band removed in [facility] in 2013 after two years with no resulted weight loss. she desires surgery at this time because of multiple health concerns and their lifestyle issues which are hindered by their weight. she has been referred by his family physician Dr Mariana Yeboah for evaluation and treatment of their obesity via surgical intervention.  Agusto Girard has tried multiple diets in her lifetime most recently tried behavior modification and unsupervised diets         Operative report reviewed - date of band removal 3/16/4764 - plication wrap was not taken down (simple band removal only)        Bariatric comorbidities present are        Patient Active Problem List   Diagnosis Code    Morbid obesity with BMI of 40.0-44.9, adult ([address] E66.01, Z68.41    Morbid obesity ([address] E66.01    History of adjustable gastric banding Z98.84    Migraine G43.909    Chronic back pain M54.9, G89.29    AVELINO (stress urinary incontinence, female) N39.3    PCOS (polycystic ovarian syndrome) E28.2    Irregular menses N92.6    GERD (gastroesophageal reflux disease) K21.9    Arthritic-like pain M25.50               Patient Active Problem List     Diagnosis Date Noted    Morbid obesity with BMI of 40.0-44.9, adult ([address]      Morbid obesity ([address]      History of adjustable gastric banding      Migraine      Chronic back pain      AVELINO (stress urinary incontinence, female)      PCOS (polycystic ovarian syndrome)      Irregular menses      GERD (gastroesophageal reflux disease)      Arthritic-like pain               Past Surgical History   Procedure Laterality Date    Hx tubal ligation        Hx gi   2011       placment of gastric band in [address] Hx gi   2013       removal of gastric band in [place] (not original surgeon)    Hx sherita herreraple tympanic membrane surgeries as a child    Hx tonsil and adenoidectomy               Social History   Substance Use Topics    Smoking status: Never Smoker    Smokeless tobacco: Not on file    Alcohol use No            Family History   Problem Relation Age of Onset    Arthritis-osteo Mother      Arthritis-osteo Father      Coronary Artery Disease Father               Current Outpatient Prescriptions   Medication Sig Dispense Refill    ASPIRIN/ACETAMINOPHEN/CAFFEINE (EXCEDRIN MIGRAINE PO) Take by mouth.        calcium-cholecalciferol, d3, (CALCIUM 600 + D) 600-125 mg-unit tab Take by mouth.        CALCIUM CARBONATE (TUMS PO) Take by mouth.        multivitamin with iron (FLINTSTONES) chewable tablet Take 1 Tab by mouth two (2) times a day.        acetaminophen (TYLENOL ARTHRITIS PAIN) 650 mg CR tablet Take 650 mg by mouth every six (6) hours as needed for Pain.        omeprazole (PRILOSEC OTC) 20 mg tablet Take 20 mg by mouth daily.        LANSOPRAZOLE (PREVACID PO) Take by mouth.          No Known Allergies         Review of Systems:      General - No history or complaints of unexpected fever, chills, or weight loss  Head/Neck - No history or complaints of headache, diplopia, dysphagia, hearing loss  Cardiac - No history or complaints of chest pain, palpitations, murmur, or shortness of breath  Pulmonary - No history or complaints of shortness of breath, productive cough, hemoptysis  Gastrointestinal - (+) reflux,no abdominal pain, obstipation/constipation or blood per rectum  Genitourinary - No history or complaints of hematuria/dysuria, stress urinary incontinence symptoms, or renal lithiasis  Musculoskeletal - moderate joint pain in their lower back, no muscular weakness  Hematologic - No history or complaints of bleeding disorders, No blood transfusions  Neurologic - No history or complaints of migraine headaches, seizure activity, syncopal episodes, TIA or stroke  Integumentary - No history or complaints of rashes, abnormal nevi, skin cancer  Gynecological - irregular menses due to PCOS     Objective:           Visit Vitals    /74 (BP 1 Location: Left arm, BP Patient Position: Sitting)    Pulse 96    Ht 5' 2.5\" (1.588 m)    Wt 101.6 kg (224 lb)    SpO2 99%    BMI 40.32 kg/m2         Physical Examination: General appearance - alert, well appearing, and in no distress and oriented to person, place, and time  Mental status - alert, oriented to person, place, and time, normal mood, behavior, speech, dress, motor activity, and thought processes  Eyes - pupils equal and reactive, extraocular eye movements intact, sclera anicteric, left eye normal, right eye normal  Ears - right ear normal, left ear normal  Nose - normal and patent, no erythema, discharge or polyps and normal nontender sinuses  Mouth - mucous membranes moist, pharynx normal without lesions  Neck - supple, no significant adenopathy  Lymphatics - no palpable lymphadenopathy, no hepatosplenomegaly  Chest - clear to auscultation, no wheezes, rales or rhonchi, symmetric air entry  Heart - normal rate, regular rhythm, normal S1, S2, no murmurs, rubs, clicks or gallops  Abdomen - soft, nontender, nondistended, no masses or organomegaly  Back exam - full range of motion, no tenderness, palpable spasm or pain on motion  Neurological - alert, oriented, normal speech, no focal findings or movement disorder noted  Musculoskeletal - no joint tenderness, deformity or swelling  Extremities - peripheral pulses normal, no pedal edema, no clubbing or cyanosis  Skin - normal coloration and turgor, no rashes, no suspicious skin lesions noted     Labs:          Recent Results    No results found for this or any previous visit (from the past 1440 hour(s)).        Assessment:      Morbid obesity with comorbidity in a prior gastric banding patient who still has a plication wrap in place    Recent UGI -     \". Drew Salcedo Contrast then  pooled in what appeared to be the proximal stomach. There was  considerable delay of contrast at that level in the cardia of the stomach  with essentially no progression for several minutes past a certain point. Finally with additional sips of barium, we were able to get some  contrast to enter the body and then the prepyloric region of the  stomach, but definitely there was some abnormality of her proximal  stomach. It is unclear as to whether or not there is a stricture at the  prior gastric band placement site or if this is simply the plication that we  are seeing or it could be that she had a significant slip and we are  seeing abnormality related to that\"    Plan:      She understands the risks benefits and alternatives to an EGD. The risks include but are not limited to; death, DVT/PE, damage to dentition, damage to the gastrointestinal tract, bleeding, infection, and complication with sedation.  She understands all of the above and wishes to proceed

## 2017-03-17 NOTE — PROCEDURES
Esophagogastroduodenoscopy Procedure Note    Indications: prior gastric band pt from [place] (band removed [address]30 seeking sleeve conversion / has some cont dysphagia despite band being removed    Anesthesia/Sedation: Versed 8 mg IV and Fentanyl 100 mcg IV    Pre-Procedure Exam:  Airway: clear   Heart: normal S1and S2    Lungs: clear bilateral  Abdomen: soft, nontender, bowel sounds present and normal in all quadrants   Mental Status: awake, alert, and oriented to person, place, and time      Procedure in Detail:  Informed consent was obtained for the procedure, including conscious sedation. Risks of pancreatitis, infection, perforation, hemorrhage, adverse drug reaction, and aspiration were discussed. The patient was placed in the left lateral decubitus position. Based on the pre-procedure assessment, including review of the patient's medical history, medications, allergies, and review of systems, he had been deemed to be an appropriate candidate for moderate sedation; he was therefore sedated with the medications listed above. He was monitored continuously with electrocardiogram tracing, pulse oximetry, blood pressure monitoring, and direct observation. The [id number] gastroscope was inserted into the mouth and advanced under direct vision to the antrum. A careful inspection was made as the gastroscope was withdrawn, including a retroflexed view of the proximal stomach; findings and interventions are described below. Appropriate photodocumentation was obtained. Findings:       Oropharynx - normal mucosa without abnormality  Esophagus - normal caliber with mild esophagitis  Stomach - structurally altered (appearance of a two chambered stomach) given her present band plication, however no other abnormalities  Duodenum - unable to penetrate pylorus  Therapies:    biopsy of stomach antrum    Specimens: Specimens were collected and sent to pathology.            Complications:   None; patient tolerated the procedure well. EBL:  None           Attending Attestation:  I performed the procedure. Recommendations:  - Follow up with me. Signed by:  Sunil Ortega MD

## 2017-03-21 VITALS — BODY MASS INDEX: 42.14 KG/M2 | WEIGHT: 229 LBS | HEIGHT: 62 IN

## 2017-03-21 NOTE — PROGRESS NOTES
Medical Weight Loss Multi-Disciplinary Program    Name: [address]   : 1980    Session# 2  Date: 3/8/2017     Height: 5' 2\" (157.5 cm)    Weight: 103.9 kg (229 lb) lbs. Body mass index is 41.88 kg/(m^2). Pounds Gained: 4    Dietary Instructions    Reviewed intake  Understanding low carbohydrates, low sugar, higher protein meals  Understanding proper portions  Instruction given for personal dietary changes  Discussed perceived compliance  Comments: Diet hx reviewed and personal dietary changes discussed. Physical Activity/Exercise    Reviewed Activity Log  Discussed Perceived Compliance  Reasonable Goals Set  Motivation  Comments: Pt is doing cardio and weight exercises for 30-45 minutes, 3-4 times per week. She plans to continue cardio and add weight lifting twice per week. Behavior Modification    Reviewed behavior modification log  Achieving/Rewarding goals met  Positive attitude  Discussed perceived compliance  Comments: Pt is doing well exercising. She has increased water, decreased diet soda, is making better snack choices, and is taking Flintstones multivitamin with iron. She is working to decrease portions by limiting snacks to no more than 2 per day and choosing protein snacks only. She also plans on no diet soda at all this month.      Candidate for surgery (per RD): pending    Dietitian: Bridgette Almendarez RD

## 2017-04-06 DIAGNOSIS — Z98.84 STATUS POST BARIATRIC SURGERY: ICD-10-CM

## 2017-04-06 DIAGNOSIS — K21.9 GASTROESOPHAGEAL REFLUX DISEASE WITHOUT ESOPHAGITIS: ICD-10-CM

## 2017-04-06 DIAGNOSIS — E66.01 MORBID OBESITY WITH BMI OF 40.0-44.9, ADULT (HCC): Primary | ICD-10-CM

## 2017-04-06 DIAGNOSIS — Z01.812 BLOOD TESTS PRIOR TO TREATMENT OR PROCEDURE: ICD-10-CM

## 2017-04-06 RX ORDER — OXYCODONE AND ACETAMINOPHEN 5; 325 MG/1; MG/1
1 TABLET ORAL
Qty: 30 TAB | Refills: 0 | Status: SHIPPED | OUTPATIENT
Start: 2017-04-06 | End: 2017-05-03

## 2017-04-10 ENCOUNTER — HOSPITAL ENCOUNTER (OUTPATIENT)
Dept: PREADMISSION TESTING | Age: 37
Discharge: HOME | End: 2017-04-10
Attending: SPECIALIST
Payer: COMMERCIAL

## 2017-04-10 ENCOUNTER — OFFICE VISIT (OUTPATIENT)
Dept: SURGERY | Age: 37
End: 2017-04-10

## 2017-04-10 DIAGNOSIS — E66.01 MORBID OBESITY DUE TO EXCESS CALORIES (HCC): ICD-10-CM

## 2017-04-10 DIAGNOSIS — E66.01 MORBID OBESITY WITH BMI OF 40.0-44.9, ADULT (HCC): ICD-10-CM

## 2017-04-10 DIAGNOSIS — K21.9 GASTROESOPHAGEAL REFLUX DISEASE WITHOUT ESOPHAGITIS: ICD-10-CM

## 2017-04-10 DIAGNOSIS — E28.2 PCOS (POLYCYSTIC OVARIAN SYNDROME): ICD-10-CM

## 2017-04-10 DIAGNOSIS — Z98.84 HISTORY OF ADJUSTABLE GASTRIC BANDING: ICD-10-CM

## 2017-04-10 DIAGNOSIS — G89.29 CHRONIC BACK PAIN, UNSPECIFIED BACK LOCATION, UNSPECIFIED BACK PAIN LATERALITY: ICD-10-CM

## 2017-04-10 DIAGNOSIS — M54.9 CHRONIC BACK PAIN, UNSPECIFIED BACK LOCATION, UNSPECIFIED BACK PAIN LATERALITY: ICD-10-CM

## 2017-04-10 DIAGNOSIS — E66.01 MORBID OBESITY WITH BMI OF 40.0-44.9, ADULT (HCC): Primary | ICD-10-CM

## 2017-04-10 DIAGNOSIS — K31.2 HOURGLASS STRICTURE OF STOMACH: Primary | ICD-10-CM

## 2017-04-10 LAB
ABO + RH BLD: NORMAL
ALBUMIN SERPL BCP-MCNC: 4.1 G/DL (ref 3.4–5)
ALBUMIN/GLOB SERPL: 1.2 {RATIO} (ref 0.8–1.7)
ALP SERPL-CCNC: 51 U/L (ref 45–117)
ALT SERPL-CCNC: 62 U/L (ref 13–56)
ANION GAP BLD CALC-SCNC: 7 MMOL/L (ref 3–18)
AST SERPL W P-5'-P-CCNC: 68 U/L (ref 15–37)
ATRIAL RATE: 69 BPM
BILIRUB SERPL-MCNC: 0.4 MG/DL (ref 0.2–1)
BLOOD GROUP ANTIBODIES SERPL: NORMAL
BUN SERPL-MCNC: 14 MG/DL (ref 7–18)
BUN/CREAT SERPL: 20 (ref 12–20)
CALCIUM SERPL-MCNC: 8.9 MG/DL (ref 8.5–10.1)
CALCULATED P AXIS, ECG09: 65 DEGREES
CALCULATED R AXIS, ECG10: 85 DEGREES
CALCULATED T AXIS, ECG11: 63 DEGREES
CHLORIDE SERPL-SCNC: 103 MMOL/L (ref 100–108)
CO2 SERPL-SCNC: 32 MMOL/L (ref 21–32)
CREAT SERPL-MCNC: 0.71 MG/DL (ref 0.6–1.3)
DIAGNOSIS, 93000: NORMAL
ERYTHROCYTE [DISTWIDTH] IN BLOOD BY AUTOMATED COUNT: 14.3 % (ref 11.6–14.5)
GLOBULIN SER CALC-MCNC: 3.5 G/DL (ref 2–4)
GLUCOSE SERPL-MCNC: 80 MG/DL (ref 74–99)
HCT VFR BLD AUTO: 35.7 % (ref 35–45)
HGB BLD-MCNC: 12 G/DL (ref 12–16)
MCH RBC QN AUTO: 29 PG (ref 24–34)
MCHC RBC AUTO-ENTMCNC: 33.6 G/DL (ref 31–37)
MCV RBC AUTO: 86.2 FL (ref 74–97)
P-R INTERVAL, ECG05: 146 MS
PLATELET # BLD AUTO: 279 K/UL (ref 135–420)
PMV BLD AUTO: 10.2 FL (ref 9.2–11.8)
POTASSIUM SERPL-SCNC: 4.1 MMOL/L (ref 3.5–5.5)
PROT SERPL-MCNC: 7.6 G/DL (ref 6.4–8.2)
Q-T INTERVAL, ECG07: 400 MS
QRS DURATION, ECG06: 86 MS
QTC CALCULATION (BEZET), ECG08: 428 MS
RBC # BLD AUTO: 4.14 M/UL (ref 4.2–5.3)
SODIUM SERPL-SCNC: 142 MMOL/L (ref 136–145)
SPECIMEN EXP DATE BLD: NORMAL
VENTRICULAR RATE, ECG03: 69 BPM
WBC # BLD AUTO: 6.7 K/UL (ref 4.6–13.2)

## 2017-04-10 PROCEDURE — 80053 COMPREHEN METABOLIC PANEL: CPT | Performed by: SPECIALIST

## 2017-04-10 PROCEDURE — 93005 ELECTROCARDIOGRAM TRACING: CPT

## 2017-04-10 PROCEDURE — 85027 COMPLETE CBC AUTOMATED: CPT | Performed by: SPECIALIST

## 2017-04-10 PROCEDURE — 86900 BLOOD TYPING SEROLOGIC ABO: CPT | Performed by: SPECIALIST

## 2017-04-10 PROCEDURE — 36415 COLL VENOUS BLD VENIPUNCTURE: CPT | Performed by: SPECIALIST

## 2017-04-10 NOTE — PATIENT INSTRUCTIONS
Body Mass Index: Care Instructions  Your Care Instructions    Body mass index (BMI) can help you see if your weight is raising your risk for health problems. It uses a formula to compare how much you weigh with how tall you are. · A BMI lower than 18.5 is considered underweight. · A BMI between 18.5 and 24.9 is considered healthy. · A BMI between 25 and 29.9 is considered overweight. A BMI of 30 or higher is considered obese. If your BMI is in the normal range, it means that you have a lower risk for weight-related health problems. If your BMI is in the overweight or obese range, you may be at increased risk for weight-related health problems, such as high blood pressure, heart disease, stroke, arthritis or joint pain, and diabetes. If your BMI is in the underweight range, you may be at increased risk for health problems such as fatigue, lower protection (immunity) against illness, muscle loss, bone loss, hair loss, and hormone problems. BMI is just one measure of your risk for weight-related health problems. You may be at higher risk for health problems if you are not active, you eat an unhealthy diet, or you drink too much alcohol or use tobacco products. Follow-up care is a key part of your treatment and safety. Be sure to make and go to all appointments, and call your doctor if you are having problems. It's also a good idea to know your test results and keep a list of the medicines you take. How can you care for yourself at home? · Practice healthy eating habits. This includes eating plenty of fruits, vegetables, whole grains, lean protein, and low-fat dairy. · If your doctor recommends it, get more exercise. Walking is a good choice. Bit by bit, increase the amount you walk every day. Try for at least 30 minutes on most days of the week. · Do not smoke. Smoking can increase your risk for health problems. If you need help quitting, talk to your doctor about stop-smoking programs and medicines. These can increase your chances of quitting for good. · Limit alcohol to 2 drinks a day for men and 1 drink a day for women. Too much alcohol can cause health problems. If you have a BMI higher than 25  · Your doctor may do other tests to check your risk for weight-related health problems. This may include measuring the distance around your waist. A waist measurement of more than 40 inches in men or 35 inches in women can increase the risk of weight-related health problems. · Talk with your doctor about steps you can take to stay healthy or improve your health. You may need to make lifestyle changes to lose weight and stay healthy, such as changing your diet and getting regular exercise. If you have a BMI lower than 18.5  · Your doctor may do other tests to check your risk for health problems. · Talk with your doctor about steps you can take to stay healthy or improve your health. You may need to make lifestyle changes to gain or maintain weight and stay healthy, such as getting more healthy foods in your diet and doing exercises to build muscle. Where can you learn more? Go to http://bentley-gina.info/. Enter S176 in the search box to learn more about \"Body Mass Index: Care Instructions. \"  Current as of: January 23, 2017  Content Version: 11.2  © 0735-0522 [employer]. Care instructions adapted under license by [employer] (which disclaims liability or warranty for this information). If you have questions about a medical condition or this instruction, always ask your healthcare professional. [place] [address] any warranty or liability for your use of this information.

## 2017-04-10 NOTE — PROGRESS NOTES
Appears to have a good understanding of the diet progression, food choices, and dietary/exercise habits for successful weight loss and nourishment after surgery. The class material included: post-op diet progression, including liquid, pureed, and low fat, low sugar food recommendations; proper food group choices, and encouraging dietary and exercise habits that lead to weight loss success.      Francis Richmond RD

## 2017-04-12 VITALS
WEIGHT: 230 LBS | SYSTOLIC BLOOD PRESSURE: 113 MMHG | DIASTOLIC BLOOD PRESSURE: 64 MMHG | RESPIRATION RATE: 16 BRPM | BODY MASS INDEX: 42.33 KG/M2 | OXYGEN SATURATION: 98 % | HEIGHT: 62 IN | HEART RATE: 83 BPM

## 2017-04-12 NOTE — PROGRESS NOTES
Gastric Bypass - History and Physical    Subjective: The patient is a 43 y.o. obese female with a Body mass index is 40.32 kg/(m^2). Bertram Harmon The patient is currently her heaviest weight for the past 3 years. she has been   overweight since childhood. she has been considering surgery since having her gastric band removed due to a slip in [place] in 2013 after two years with   no resulted weight loss. She has been experiencing additionally a sticking sensation when she eats. She underwent an UGI and this showed almost 2 compartments   of her stomach with a smaller proximal pouch narrowing severely into a much larger gastric body and pre pyloric region. EGD performed confirmed the   fact that she either has a proximal stricture or persistent gastric prolapse causing partial obstruction. She now presents for correction of her issue.        Operative report reviewed - date of band removal 7/59/8493 - plication wrap was not taken down (simple band removal only)     Patient Active Problem List   Diagnosis Code    Morbid obesity with BMI of 40.0-44.9, adult ([facility] [address] E66.01, Z68.41    Morbid obesity ([address] E66.01    History of adjustable gastric banding Z98.84    Migraine G43.909    Chronic back pain M54.9, G89.29    AVELINO (stress urinary incontinence, female) N39.3    PCOS (polycystic ovarian syndrome) E28.2    Irregular menses N92.6    GERD (gastroesophageal reflux disease) K21.9    Arthritic-like pain M25.50    Hourglass stricture of stomach K31.2       They have been generally well prior to this visit and have had no recent significant illnesses. The patient has had no gastrointestinal issues that would preclude them   from proceeding with the surgery they have chosen. Tihs Hunter has recently tried a preoperative weight loss program  in addition to seeing a bariatric nutritionist   preoperatively.  We have discussed on at least one other occasion about the various types of surgical weight loss procedures and they have considered these options   after our initial consultation. We have once again discussed these procedures in detail and they have now decided on a surgical procedure. They present today to discuss   this and confirm that their evaluation pre operatively is acceptable to continue with surgery. The patient desires laparoscopic gastric bypass surgery  As a conversion procedure for surgical weight loss and correction of her problem. The patients goal weight is 140lb. These goals are consistent with expected outcomes of their desired operation. her Medical goals are resolution of these health issues. Patient Active Problem List    Diagnosis Date Noted    Hourglass stricture of stomach     Morbid obesity with BMI of 40.0-44.9, adult ([address]     Morbid obesity ([address]     History of adjustable gastric banding     Migraine     Chronic back pain     AVELINO (stress urinary incontinence, female)     PCOS (polycystic ovarian syndrome)     Irregular menses     GERD (gastroesophageal reflux disease)     Arthritic-like pain       Past Surgical History:   Procedure Laterality Date    HX GI  2011    placment of gastric band in [address] [place] [address] HX GI  2013    removal of gastric band in [place] (not original surgeon)    HX HEENT      mulitple tympanic membrane surgeries as a child    HX TONSIL AND ADENOIDECTOMY      HX TUBAL LIGATION        Social History   Substance Use Topics    Smoking status: Never Smoker    Smokeless tobacco: Never Used    Alcohol use No      Family History   Problem Relation Age of Onset    Arthritis-osteo Mother     Arthritis-osteo Father     Coronary Artery Disease Father       Current Outpatient Prescriptions   Medication Sig Dispense Refill    acetaminophen (TYLENOL) 325 mg tablet Take 325 mg by mouth every four (4) hours as needed for Pain.  calcium-cholecalciferol, d3, (CALCIUM 600 + D) 600-125 mg-unit tab Take  by mouth.       CALCIUM CARBONATE (TUMS PO) Take  by mouth.  multivitamin with iron (FLINTSTONES) chewable tablet Take 1 Tab by mouth two (2) times a day.  loratadine (CLARITIN) 10 mg tablet Take 10 mg by mouth daily as needed for Allergies.  oxyCODONE-acetaminophen (PERCOCET) 5-325 mg per tablet Take 1 Tab by mouth every four (4) hours as needed for Pain. Max Daily Amount: 6 Tabs. 30 Tab 0    ASPIRIN/ACETAMINOPHEN/CAFFEINE (EXCEDRIN MIGRAINE PO) Take  by mouth.        No Known Allergies       Review of Systems:          General - No history or complaints of unexpected fever, chills, or weight loss  Head/Neck - No history or complaints of headache, diplopia, dysphagia, hearing loss  Cardiac - No history or complaints of chest pain, palpitations, murmur, or shortness of breath  Pulmonary - No history or complaints of shortness of breath, productive cough, hemoptysis  Gastrointestinal - severe reflux,no  abdominal pain, obstipation/constipation or blood per rectum  Genitourinary - No history or complaints of hematuria/dysuria, stress urinary incontinence symptoms, or renal lithiasis  Musculoskeletal - no joint pain ,  no muscular weakness  Hematologic - No history or complaints of bleeding disorders,  No blood transfusions  Neurologic - No history or complaints of  migraine headaches, seizure activity, syncopal episodes, TIA or stroke  Integumentary - No history or complaints of rashes, abnormal nevi, skin cancer  Gynecological - normal             Objective:     Visit Vitals    /64 (BP 1 Location: Left arm, BP Patient Position: Sitting)    Pulse 83    Resp 16    Ht 5' 2\" (1.575 m)    Wt 104.3 kg (230 lb)    LMP 03/21/2017    SpO2 98%    BMI 42.07 kg/m2       Physical Examination: General appearance - alert, well appearing, and in no distress and oriented to person, place, and time  Mental status - alert, oriented to person, place, and time, normal mood, behavior, speech, dress, motor activity, and thought processes  Eyes - pupils equal and reactive, extraocular eye movements intact, sclera anicteric, left eye normal, right eye normal  Ears - bilateral TM's and external ear canals normal, right ear normal, left ear normal  Nose - normal and patent, no erythema, discharge or polyps  Mouth - mucous membranes moist, pharynx normal without lesions  Neck - supple, no significant adenopathy  Lymphatics - no palpable lymphadenopathy, no hepatosplenomegaly  Chest - clear to auscultation, no wheezes, rales or rhonchi, symmetric air entry  Heart - normal rate, regular rhythm, normal S1, S2, no murmurs, rubs, clicks or gallops  Abdomen - soft, nontender, nondistended, no masses or organomegaly  Back exam - full range of motion, no tenderness, palpable spasm or pain on motion  Neurological - alert, oriented, normal speech, no focal findings or movement disorder noted  Musculoskeletal - no joint tenderness, deformity or swelling  Extremities - peripheral pulses normal, no pedal edema, no clubbing or cyanosis    Labs :     Lab Results   Component Value Date/Time    WBC 6.7 04/10/2017 12:24 PM    HGB 12.0 04/10/2017 12:24 PM    HCT 35.7 04/10/2017 12:24 PM    PLATELET 380 18/18/3155 12:24 PM    MCV 86.2 04/10/2017 12:24 PM     Lab Results   Component Value Date/Time    Sodium 142 04/10/2017 12:24 PM    Potassium 4.1 04/10/2017 12:24 PM    Chloride 103 04/10/2017 12:24 PM    CO2 32 04/10/2017 12:24 PM    Anion gap 7 04/10/2017 12:24 PM    Glucose 80 04/10/2017 12:24 PM    BUN 14 04/10/2017 12:24 PM    Creatinine 0.71 04/10/2017 12:24 PM    BUN/Creatinine ratio 20 04/10/2017 12:24 PM    GFR est AA >60 04/10/2017 12:24 PM    GFR est non-AA >60 04/10/2017 12:24 PM    Calcium 8.9 04/10/2017 12:24 PM    Bilirubin, total 0.4 04/10/2017 12:24 PM    AST (SGOT) 68 04/10/2017 12:24 PM    Alk.  phosphatase 51 04/10/2017 12:24 PM    Protein, total 7.6 04/10/2017 12:24 PM    Albumin 4.1 04/10/2017 12:24 PM    Globulin 3.5 04/10/2017 12:24 PM A-G Ratio 1.2 04/10/2017 12:24 PM    ALT (SGPT) 62 04/10/2017 12:24 PM     No results found for: IRON, FE, TIBC, IBCT, PSAT, FERR  No results found for: FOL, RBCF  No results found for: Yakelin Randhawa            Cardiac / Pulmonary Evaluation:         UGI Results:   Name: aDniel Muir  MR#: [id number]  : 1980  Account #: [de-identified]  Date of Adm: 2017  Date of Surgery: 2017        AMENDED REPORT: Revised CSN 2017/bjs           PREOPERATIVE DIAGNOSIS: This patient is status post gastric band  removal in [place] in  with persistent morbid obesity for  possible conversion procedure.     POSTOPERATIVE DIAGNOSIS: This patient is status post gastric  band removal in [place] in  with persistent morbid obesity  for possible conversion procedure with abnormal proximal stomach of  unclear etiology, possible stricture at prior lap band.     PROCEDURE PERFORMED: Upper gastrointestinal study with  barium.     ESTIMATED BLOOD LOSS: None.     SPECIMENS REMOVED: None.     ANESTHESIA: None.     STATEMENT ASSESSMENT: The patient is a 41-year-old female who  had a gastric band placed in [place] in the past. She ultimately  had the band removed for chronic dysphagia and what she was told  was an early slip. She finally presented to us in consultation for  possible conversion procedure to another operation due to persistent  morbid obesity. She is here today for upper GI study to assess her  prior operation and to see whether or not her gastric anatomy is  normal. Of note is the fact that we did obtain a copy of her Lap-Band  removal operative report and the surgeon there in [place] did  not take down her plication from her Lap-Band.     PROCEDURE: The patient was brought to the fluoroscopy unit. After  swallow barium during the exam, she was noted to have normal  peristalsis of her esophagus.  She had filling of the distal esophagus it  tapered into and through the gastroesophageal junction. Contrast then  pooled in what appeared to be the proximal stomach. There was  considerable delay of contrast at that level in the cardia of the stomach  with essentially no progression for several minutes past a certain point. Finally with additional sips of barium, we were able to get some  contrast to enter the body and then the prepyloric region of the  stomach, but definitely there was some abnormality of her proximal  stomach. It is unclear as to whether or not there is a stricture at the  prior gastric band placement site or if this is simply the plication that we  are seeing or it could be that she had a significant slip and we are  seeing abnormality related to that. In any case, we will need to perform  an endoscopy on the patient to better delineate her anatomy.           Fito Swartz MD      EGD Report    Juan Goodrich MD   General Surgery   Pre-procedure Diagnoses:   1. Dysphagia, unspecified type [R13.10]   Post-procedure Diagnoses:   1. Esophagitis [K20.9]   Procedures:   1. EGD [MRG1781 (Custom)]   2. BIOPSY OF STOMACH [99775 (CPT®)]      []Hide copied text  Esophagogastroduodenoscopy Procedure Note     Indications: prior gastric band pt from [place] (band removed 20130 seeking sleeve conversion / has some cont dysphagia despite band being removed     Anesthesia/Sedation: Versed 8 mg IV and Fentanyl 100 mcg IV     Pre-Procedure Exam:  Airway: clear   Heart: normal S1and S2   Lungs: clear bilateral  Abdomen: soft, nontender, bowel sounds present and normal in all quadrants   Mental Status: awake, alert, and oriented to person, place, and time      Procedure in Detail:  Informed consent was obtained for the procedure, including conscious sedation. Risks of pancreatitis, infection, perforation, hemorrhage, adverse drug reaction, and aspiration were discussed. The patient was placed in the left lateral decubitus position.  Based on the pre-procedure assessment, including review of the patient's medical history, medications, allergies, and review of systems, he had been deemed to be an appropriate candidate for moderate sedation; he was therefore sedated with the medications listed above. He was monitored continuously with electrocardiogram tracing, pulse oximetry, blood pressure monitoring, and direct observation.      The [id number] gastroscope was inserted into the mouth and advanced under direct vision to the antrum. A careful inspection was made as the gastroscope was withdrawn, including a retroflexed view of the proximal stomach; findings and interventions are described below. Appropriate photodocumentation was obtained.     Findings:         Oropharynx - normal mucosa without abnormality  Esophagus - normal caliber with mild esophagitis  Stomach - structurally altered (appearance of a two chambered stomach) given her present band plication, however no other abnormalities  Duodenum - unable to penetrate pylorus  Therapies:   biopsy of stomach antrum     Specimens: Specimens were collected and sent to pathology.      Complications: None; patient tolerated the procedure well.     EBL:  None      Attending Attestation: I performed the procedure.                Assessment:     Morbid obesity with associated comorbidity and likely stricture of stomach due to incompletely released gastric prolapse    Plan:     laparoscopic gastric bypass surgery as a conversion procedure    This is a 43 y.o. female with a BMI of Body mass index is 42.07 kg/(m^2). and the weight-related co-morbidties as noted above. Henny Ambrosio meets the NIH criteria for bariatric surgery   based upon the BMI of Body mass index is 42.07 kg/(m^2). and multiple weight-related co-morbidties.  Henny Ambrosio has elected laparoscopic gastric bypass as her intervention of choice   for treatment of morbid obestiy through surgical means secondary to its uniform results,  profound baseline suppression of hunger and pace at which weight is lost.    In the office today, following Yadira's history and physical examination, a 40 minute discussion regarding the anatomic alterations for the laparoscopic gastric bypass  was undertaken. The dietary expectations and the patient  dependent factors for success were thoroughly discussed, to include the need for interval follow-up and long-term dietary changes associated   with success. The possible short and long term  complications of the gastric bypass were also discussed, to include but not limited to;death, DVT/PE, staple line leak, bleeding, stricture   formation, infection,internal hernia  and pouch dilation. Specific weight related outcomes for success were also discussed with an emphasis on careful and close follow-up with the first   year and Dietary behavior modification over the first years as baseline cyclical hunger returns  The patient expressed an understanding of the above factors, and her questions were answered in their entirety. Today, the patient had all of her questions answered and the decision was made today that the patient's preoperative evaluation is acceptable for them  to proceed with   bariatric surgery  choosing  gastric bypass as her surgical option as a conversion procedure. The patient understands that there are many unknowns with her procedure particularly why we are seeing  such an abnormal anatomical situation. My guess is that she had a   severe slip that really never resolved due to her plication wrap not having been taken down. The plan is for exploration and conversion to a gastric bypass if feasible. If her anatomy is too significantly altered then we will need to simply take down her plication and come   back another day for her gastric bypass procedure.     Secondary Diagnoses:     DVT / Pulmonary Embolus Risk - The patient is at a higher risk for post operative DVT / pulmonary embolus secondary to their morbid obese status, relative sedentary lifestyle, and impending general anesthetic. We will plan   to use anticoagulation therapy pre and post operative as well as TEDs and  pneumatic compression devices and encourage ambulation once on the hospital nursing floor. The need for possible at home anticoagulation therapy   has also been discussed and any decision on this matter will be made during post operative evaluations. The patient understands that their efforts at ambulation are of vital importance to reduce the risk of this complication thus   placing significant burden on them as to the prevention of such issues. Signs and symptoms of DVT / PE have been discussed with the patient and they have been instructed to call the office if any these occur in the \"at home\" post op phase. GERD -The patient understands that weight loss surgery is not a guaranteed cure for reflux disease but does understand the benefits that weight loss can have on reflux disease.  They also understand that at the time of surgery   the gastroesophageal junction will be evaluated for the presence of a diaphragmatic hernia.  Hernias will be corrected always with the gastric band and sleeve gastrectomy procedures, but only on a case by case basis with the   gastric bypass if it prevents our ability to perform the operation at hand, or if I feel that they would benefit long term with correction of this issue.  The patient also understands that neither weight loss surgery nor repair of a   diaphragmatic hernia repair guarantees the complete cessation of the disease. They also understand there is a possibility of recurrence with a simple crural repair as is performed with these procedures. They understand they   may have to continue their medications in the postoperative period.  They have a good understanding that the gastric bypass procedure is better suited to total resolution of this issue and that neither the Lap Band nor sleeve   gastrectomy is considered a curative procedure as it pertains to this diagnosis.     Polycystic Ovarian Syndrome -The patient does understand the association between obesity and the development of PCOS.  They understand that weight loss can often improve symptoms and in many cases cure the disease.    If the disease is associated with infertility this too is often corrected with adequate weight loss.  The patient understands that any change to the pharmaceutical treatment of her PCOS will be managed by the primary care physician or OB/GYN      Signed By: Alan Azul MD     April 12, 2017

## 2017-04-18 ENCOUNTER — ANESTHESIA EVENT (OUTPATIENT)
Dept: SURGERY | Age: 37
DRG: 621 | End: 2017-04-18
Payer: COMMERCIAL

## 2017-04-18 ENCOUNTER — HOSPITAL ENCOUNTER (INPATIENT)
Age: 37
LOS: 1 days | Discharge: HOME | DRG: 621 | End: 2017-04-19
Attending: SPECIALIST | Admitting: SPECIALIST
Payer: COMMERCIAL

## 2017-04-18 ENCOUNTER — ANESTHESIA (OUTPATIENT)
Dept: SURGERY | Age: 37
DRG: 621 | End: 2017-04-18
Payer: COMMERCIAL

## 2017-04-18 LAB — HCG SERPL QL: NEGATIVE

## 2017-04-18 PROCEDURE — 77030036732 HC RELD STPLR VASC J&J -F: Performed by: SPECIALIST

## 2017-04-18 PROCEDURE — 77030034154 HC SHR COAG HARM ACE J&J -F: Performed by: SPECIALIST

## 2017-04-18 PROCEDURE — 77030002966 HC SUT PDS J&J -A: Performed by: SPECIALIST

## 2017-04-18 PROCEDURE — 77030012893: Performed by: SPECIALIST

## 2017-04-18 PROCEDURE — 77030008477 HC STYL SATN SLP COVD -A: Performed by: NURSE ANESTHETIST, CERTIFIED REGISTERED

## 2017-04-18 PROCEDURE — 77030013567 HC DRN WND RESERV BARD -A: Performed by: SPECIALIST

## 2017-04-18 PROCEDURE — 74011250636 HC RX REV CODE- 250/636: Performed by: SPECIALIST

## 2017-04-18 PROCEDURE — 0BQS4ZZ REPAIR LEFT DIAPHRAGM, PERCUTANEOUS ENDOSCOPIC APPROACH: ICD-10-PCS | Performed by: SPECIALIST

## 2017-04-18 PROCEDURE — 74011250637 HC RX REV CODE- 250/637: Performed by: ANESTHESIOLOGY

## 2017-04-18 PROCEDURE — 74011250636 HC RX REV CODE- 250/636

## 2017-04-18 PROCEDURE — 77030003580 HC NDL INSUF VERES J&J -B: Performed by: SPECIALIST

## 2017-04-18 PROCEDURE — 0FB04ZX EXCISION OF LIVER, PERCUTANEOUS ENDOSCOPIC APPROACH, DIAGNOSTIC: ICD-10-PCS | Performed by: SPECIALIST

## 2017-04-18 PROCEDURE — 74011250636 HC RX REV CODE- 250/636: Performed by: ANESTHESIOLOGY

## 2017-04-18 PROCEDURE — 76010000131 HC OR TIME 2 TO 2.5 HR: Performed by: SPECIALIST

## 2017-04-18 PROCEDURE — 36415 COLL VENOUS BLD VENIPUNCTURE: CPT | Performed by: SPECIALIST

## 2017-04-18 PROCEDURE — 74011000250 HC RX REV CODE- 250

## 2017-04-18 PROCEDURE — 77030011640 HC PAD GRND REM COVD -A: Performed by: SPECIALIST

## 2017-04-18 PROCEDURE — 74011000250 HC RX REV CODE- 250: Performed by: SPECIALIST

## 2017-04-18 PROCEDURE — 77030008683 HC TU ET CUF COVD -A: Performed by: NURSE ANESTHETIST, CERTIFIED REGISTERED

## 2017-04-18 PROCEDURE — 74011250637 HC RX REV CODE- 250/637: Performed by: SPECIALIST

## 2017-04-18 PROCEDURE — 77030020782 HC GWN BAIR PAWS FLX 3M -B: Performed by: SPECIALIST

## 2017-04-18 PROCEDURE — 77030022585 HC SEAL FBRN EVICEL J&J -F: Performed by: SPECIALIST

## 2017-04-18 PROCEDURE — 88307 TISSUE EXAM BY PATHOLOGIST: CPT | Performed by: SPECIALIST

## 2017-04-18 PROCEDURE — 74011000250 HC RX REV CODE- 250: Performed by: ANESTHESIOLOGY

## 2017-04-18 PROCEDURE — 77030010515 HC APPL ENDOCLP LIG J&J -B: Performed by: SPECIALIST

## 2017-04-18 PROCEDURE — 84703 CHORIONIC GONADOTROPIN ASSAY: CPT | Performed by: SPECIALIST

## 2017-04-18 PROCEDURE — 65270000029 HC RM PRIVATE

## 2017-04-18 PROCEDURE — 88342 IMHCHEM/IMCYTCHM 1ST ANTB: CPT | Performed by: SPECIALIST

## 2017-04-18 PROCEDURE — 77030002935 HC SUT MCRYL J&J -C: Performed by: SPECIALIST

## 2017-04-18 PROCEDURE — 77030034850: Performed by: SPECIALIST

## 2017-04-18 PROCEDURE — 77030002912 HC SUT ETHBND J&J -A: Performed by: SPECIALIST

## 2017-04-18 PROCEDURE — 0BQR4ZZ REPAIR RIGHT DIAPHRAGM, PERCUTANEOUS ENDOSCOPIC APPROACH: ICD-10-PCS | Performed by: SPECIALIST

## 2017-04-18 PROCEDURE — 77030014008 HC SPNG HEMSTAT J&J -C: Performed by: SPECIALIST

## 2017-04-18 PROCEDURE — 77030027876 HC STPLR ENDOSC FLX PWR J&J -G1: Performed by: SPECIALIST

## 2017-04-18 PROCEDURE — 0DNW4ZZ RELEASE PERITONEUM, PERCUTANEOUS ENDOSCOPIC APPROACH: ICD-10-PCS | Performed by: SPECIALIST

## 2017-04-18 PROCEDURE — 77030009851 HC PCH RTVR ENDOSC AMR -B: Performed by: SPECIALIST

## 2017-04-18 PROCEDURE — 76060000035 HC ANESTHESIA 2 TO 2.5 HR: Performed by: SPECIALIST

## 2017-04-18 PROCEDURE — 77030018004 HC RELD STPLR ENDOSC1 J&J -C: Performed by: SPECIALIST

## 2017-04-18 PROCEDURE — 77030020255 HC SOL INJ LR 1000ML BG: Performed by: SPECIALIST

## 2017-04-18 PROCEDURE — 77030002896 HC SUT CLP ABSRB J&J -B: Performed by: SPECIALIST

## 2017-04-18 PROCEDURE — 77030032490 HC SLV COMPR SCD KNE COVD -B: Performed by: SPECIALIST

## 2017-04-18 PROCEDURE — 77030010030: Performed by: SPECIALIST

## 2017-04-18 PROCEDURE — 0D164ZA BYPASS STOMACH TO JEJUNUM, PERCUTANEOUS ENDOSCOPIC APPROACH: ICD-10-PCS | Performed by: SPECIALIST

## 2017-04-18 PROCEDURE — 77030016151 HC PROTCTR LNS DFOG COVD -B: Performed by: SPECIALIST

## 2017-04-18 PROCEDURE — 77030018836 HC SOL IRR NACL ICUM -A: Performed by: SPECIALIST

## 2017-04-18 PROCEDURE — 88313 SPECIAL STAINS GROUP 2: CPT | Performed by: SPECIALIST

## 2017-04-18 PROCEDURE — 77030036598 HC CARTDRG STPL RELD ECHELON FLX J&J -D: Performed by: SPECIALIST

## 2017-04-18 PROCEDURE — 77030002986 HC SUT PROL J&J -A: Performed by: SPECIALIST

## 2017-04-18 PROCEDURE — 88305 TISSUE EXAM BY PATHOLOGIST: CPT | Performed by: SPECIALIST

## 2017-04-18 PROCEDURE — 77030002916 HC SUT ETHLN J&J -A: Performed by: SPECIALIST

## 2017-04-18 PROCEDURE — 77030012407 HC DRN WND BARD -B: Performed by: SPECIALIST

## 2017-04-18 PROCEDURE — 77030033271 HC TRCR ENDOSC EPATH2 J&J -B: Performed by: SPECIALIST

## 2017-04-18 PROCEDURE — 0DB68ZX EXCISION OF STOMACH, VIA NATURAL OR ARTIFICIAL OPENING ENDOSCOPIC, DIAGNOSTIC: ICD-10-PCS | Performed by: SPECIALIST

## 2017-04-18 PROCEDURE — 77030009426 HC FCPS BIOP ENDOSC BSC -B: Performed by: SPECIALIST

## 2017-04-18 PROCEDURE — 77030008603 HC TRCR ENDOSC EPATH J&J -C: Performed by: SPECIALIST

## 2017-04-18 PROCEDURE — 77030010507 HC ADH SKN DERMBND J&J -B: Performed by: SPECIALIST

## 2017-04-18 PROCEDURE — 76210000000 HC OR PH I REC 2 TO 2.5 HR: Performed by: SPECIALIST

## 2017-04-18 PROCEDURE — 77030005264 HC CATH JEJU BKR BARD -D: Performed by: SPECIALIST

## 2017-04-18 PROCEDURE — 77030006643: Performed by: NURSE ANESTHETIST, CERTIFIED REGISTERED

## 2017-04-18 PROCEDURE — 77030011810 HC STPLR ENDOSC J&J -G: Performed by: SPECIALIST

## 2017-04-18 RX ORDER — NYSTATIN 100000 [USP'U]/ML
500000 SUSPENSION ORAL
Status: COMPLETED | OUTPATIENT
Start: 2017-04-18 | End: 2017-04-18

## 2017-04-18 RX ORDER — MIDAZOLAM HYDROCHLORIDE 1 MG/ML
INJECTION, SOLUTION INTRAMUSCULAR; INTRAVENOUS AS NEEDED
Status: DISCONTINUED | OUTPATIENT
Start: 2017-04-18 | End: 2017-04-18 | Stop reason: HOSPADM

## 2017-04-18 RX ORDER — INSULIN LISPRO 100 [IU]/ML
INJECTION, SOLUTION INTRAVENOUS; SUBCUTANEOUS ONCE
Status: DISCONTINUED | OUTPATIENT
Start: 2017-04-18 | End: 2017-04-18 | Stop reason: HOSPADM

## 2017-04-18 RX ORDER — DIPHENHYDRAMINE HYDROCHLORIDE 50 MG/ML
12.5 INJECTION, SOLUTION INTRAMUSCULAR; INTRAVENOUS
Status: DISCONTINUED | OUTPATIENT
Start: 2017-04-18 | End: 2017-04-18 | Stop reason: HOSPADM

## 2017-04-18 RX ORDER — ALBUTEROL SULFATE 0.83 MG/ML
2.5 SOLUTION RESPIRATORY (INHALATION) AS NEEDED
Status: DISCONTINUED | OUTPATIENT
Start: 2017-04-18 | End: 2017-04-18 | Stop reason: HOSPADM

## 2017-04-18 RX ORDER — ONDANSETRON 2 MG/ML
4 INJECTION INTRAMUSCULAR; INTRAVENOUS
Status: DISCONTINUED | OUTPATIENT
Start: 2017-04-18 | End: 2017-04-19 | Stop reason: HOSPADM

## 2017-04-18 RX ORDER — HYDROMORPHONE HYDROCHLORIDE 2 MG/ML
INJECTION, SOLUTION INTRAMUSCULAR; INTRAVENOUS; SUBCUTANEOUS AS NEEDED
Status: DISCONTINUED | OUTPATIENT
Start: 2017-04-18 | End: 2017-04-18 | Stop reason: HOSPADM

## 2017-04-18 RX ORDER — HYDROMORPHONE HYDROCHLORIDE 1 MG/ML
1 INJECTION, SOLUTION INTRAMUSCULAR; INTRAVENOUS; SUBCUTANEOUS
Status: DISCONTINUED | OUTPATIENT
Start: 2017-04-18 | End: 2017-04-19

## 2017-04-18 RX ORDER — CEFAZOLIN SODIUM IN 0.9 % NACL 2 G/100 ML
2 PLASTIC BAG, INJECTION (ML) INTRAVENOUS ONCE
Status: COMPLETED | OUTPATIENT
Start: 2017-04-18 | End: 2017-04-18

## 2017-04-18 RX ORDER — HYDROMORPHONE HYDROCHLORIDE 2 MG/ML
0.5 INJECTION, SOLUTION INTRAMUSCULAR; INTRAVENOUS; SUBCUTANEOUS
Status: DISCONTINUED | OUTPATIENT
Start: 2017-04-18 | End: 2017-04-18 | Stop reason: HOSPADM

## 2017-04-18 RX ORDER — FENTANYL CITRATE 50 UG/ML
INJECTION, SOLUTION INTRAMUSCULAR; INTRAVENOUS AS NEEDED
Status: DISCONTINUED | OUTPATIENT
Start: 2017-04-18 | End: 2017-04-18 | Stop reason: HOSPADM

## 2017-04-18 RX ORDER — HYDROMORPHONE HYDROCHLORIDE 1 MG/ML
0.5 INJECTION, SOLUTION INTRAMUSCULAR; INTRAVENOUS; SUBCUTANEOUS
Status: DISCONTINUED | OUTPATIENT
Start: 2017-04-18 | End: 2017-04-19

## 2017-04-18 RX ORDER — SCOLOPAMINE TRANSDERMAL SYSTEM 1 MG/1
1.5 PATCH, EXTENDED RELEASE TRANSDERMAL ONCE
Status: DISCONTINUED | OUTPATIENT
Start: 2017-04-18 | End: 2017-04-19 | Stop reason: HOSPADM

## 2017-04-18 RX ORDER — GLYCOPYRROLATE 0.2 MG/ML
INJECTION INTRAMUSCULAR; INTRAVENOUS AS NEEDED
Status: DISCONTINUED | OUTPATIENT
Start: 2017-04-18 | End: 2017-04-18 | Stop reason: HOSPADM

## 2017-04-18 RX ORDER — MAGNESIUM SULFATE 100 %
4 CRYSTALS MISCELLANEOUS AS NEEDED
Status: DISCONTINUED | OUTPATIENT
Start: 2017-04-18 | End: 2017-04-18 | Stop reason: HOSPADM

## 2017-04-18 RX ORDER — ROCURONIUM BROMIDE 10 MG/ML
INJECTION, SOLUTION INTRAVENOUS AS NEEDED
Status: DISCONTINUED | OUTPATIENT
Start: 2017-04-18 | End: 2017-04-18 | Stop reason: HOSPADM

## 2017-04-18 RX ORDER — LIDOCAINE HYDROCHLORIDE 20 MG/ML
INJECTION, SOLUTION EPIDURAL; INFILTRATION; INTRACAUDAL; PERINEURAL AS NEEDED
Status: DISCONTINUED | OUTPATIENT
Start: 2017-04-18 | End: 2017-04-18 | Stop reason: HOSPADM

## 2017-04-18 RX ORDER — DEXTROSE 50 % IN WATER (D50W) INTRAVENOUS SYRINGE
25-50 AS NEEDED
Status: DISCONTINUED | OUTPATIENT
Start: 2017-04-18 | End: 2017-04-18 | Stop reason: HOSPADM

## 2017-04-18 RX ORDER — NEOSTIGMINE METHYLSULFATE 5 MG/5 ML
SYRINGE (ML) INTRAVENOUS AS NEEDED
Status: DISCONTINUED | OUTPATIENT
Start: 2017-04-18 | End: 2017-04-18 | Stop reason: HOSPADM

## 2017-04-18 RX ORDER — FAMOTIDINE 20 MG/50ML
20 INJECTION, SOLUTION INTRAVENOUS ONCE
Status: DISCONTINUED | OUTPATIENT
Start: 2017-04-18 | End: 2017-04-18 | Stop reason: RX

## 2017-04-18 RX ORDER — KETOROLAC TROMETHAMINE 30 MG/ML
30 INJECTION, SOLUTION INTRAMUSCULAR; INTRAVENOUS EVERY 6 HOURS
Status: DISCONTINUED | OUTPATIENT
Start: 2017-04-18 | End: 2017-04-19 | Stop reason: HOSPADM

## 2017-04-18 RX ORDER — ENOXAPARIN SODIUM 100 MG/ML
40 INJECTION SUBCUTANEOUS EVERY 12 HOURS
Status: DISCONTINUED | OUTPATIENT
Start: 2017-04-18 | End: 2017-04-19 | Stop reason: HOSPADM

## 2017-04-18 RX ORDER — SODIUM CHLORIDE 0.9 % (FLUSH) 0.9 %
5-10 SYRINGE (ML) INJECTION AS NEEDED
Status: DISCONTINUED | OUTPATIENT
Start: 2017-04-18 | End: 2017-04-18 | Stop reason: HOSPADM

## 2017-04-18 RX ORDER — HYDROCODONE BITARTRATE AND ACETAMINOPHEN 5; 325 MG/1; MG/1
1 TABLET ORAL AS NEEDED
Status: DISCONTINUED | OUTPATIENT
Start: 2017-04-18 | End: 2017-04-18 | Stop reason: HOSPADM

## 2017-04-18 RX ORDER — NALOXONE HYDROCHLORIDE 0.4 MG/ML
0.1 INJECTION, SOLUTION INTRAMUSCULAR; INTRAVENOUS; SUBCUTANEOUS AS NEEDED
Status: DISCONTINUED | OUTPATIENT
Start: 2017-04-18 | End: 2017-04-18 | Stop reason: HOSPADM

## 2017-04-18 RX ORDER — PROPOFOL 10 MG/ML
INJECTION, EMULSION INTRAVENOUS AS NEEDED
Status: DISCONTINUED | OUTPATIENT
Start: 2017-04-18 | End: 2017-04-18 | Stop reason: HOSPADM

## 2017-04-18 RX ORDER — ONDANSETRON 2 MG/ML
4 INJECTION INTRAMUSCULAR; INTRAVENOUS ONCE
Status: DISCONTINUED | OUTPATIENT
Start: 2017-04-18 | End: 2017-04-18 | Stop reason: HOSPADM

## 2017-04-18 RX ORDER — ENOXAPARIN SODIUM 100 MG/ML
40 INJECTION SUBCUTANEOUS ONCE
Status: COMPLETED | OUTPATIENT
Start: 2017-04-18 | End: 2017-04-18

## 2017-04-18 RX ORDER — ACETAMINOPHEN 10 MG/ML
1000 INJECTION, SOLUTION INTRAVENOUS EVERY 6 HOURS
Status: DISCONTINUED | OUTPATIENT
Start: 2017-04-18 | End: 2017-04-19 | Stop reason: HOSPADM

## 2017-04-18 RX ORDER — SODIUM CHLORIDE, SODIUM LACTATE, POTASSIUM CHLORIDE, CALCIUM CHLORIDE 600; 310; 30; 20 MG/100ML; MG/100ML; MG/100ML; MG/100ML
150 INJECTION, SOLUTION INTRAVENOUS CONTINUOUS
Status: DISCONTINUED | OUTPATIENT
Start: 2017-04-18 | End: 2017-04-19 | Stop reason: HOSPADM

## 2017-04-18 RX ORDER — CEFAZOLIN SODIUM IN 0.9 % NACL 2 G/100 ML
2 PLASTIC BAG, INJECTION (ML) INTRAVENOUS EVERY 8 HOURS
Status: COMPLETED | OUTPATIENT
Start: 2017-04-18 | End: 2017-04-19

## 2017-04-18 RX ORDER — ACETAMINOPHEN 10 MG/ML
1000 INJECTION, SOLUTION INTRAVENOUS ONCE
Status: COMPLETED | OUTPATIENT
Start: 2017-04-18 | End: 2017-04-18

## 2017-04-18 RX ORDER — DIPHENHYDRAMINE HYDROCHLORIDE 50 MG/ML
25 INJECTION, SOLUTION INTRAMUSCULAR; INTRAVENOUS
Status: DISCONTINUED | OUTPATIENT
Start: 2017-04-18 | End: 2017-04-19 | Stop reason: HOSPADM

## 2017-04-18 RX ORDER — KETOROLAC TROMETHAMINE 30 MG/ML
INJECTION, SOLUTION INTRAMUSCULAR; INTRAVENOUS AS NEEDED
Status: DISCONTINUED | OUTPATIENT
Start: 2017-04-18 | End: 2017-04-18 | Stop reason: HOSPADM

## 2017-04-18 RX ORDER — ONDANSETRON 2 MG/ML
INJECTION INTRAMUSCULAR; INTRAVENOUS AS NEEDED
Status: DISCONTINUED | OUTPATIENT
Start: 2017-04-18 | End: 2017-04-18 | Stop reason: HOSPADM

## 2017-04-18 RX ORDER — SODIUM CHLORIDE, SODIUM LACTATE, POTASSIUM CHLORIDE, CALCIUM CHLORIDE 600; 310; 30; 20 MG/100ML; MG/100ML; MG/100ML; MG/100ML
125 INJECTION, SOLUTION INTRAVENOUS CONTINUOUS
Status: DISCONTINUED | OUTPATIENT
Start: 2017-04-18 | End: 2017-04-18

## 2017-04-18 RX ORDER — SODIUM CHLORIDE, SODIUM LACTATE, POTASSIUM CHLORIDE, CALCIUM CHLORIDE 600; 310; 30; 20 MG/100ML; MG/100ML; MG/100ML; MG/100ML
150 INJECTION, SOLUTION INTRAVENOUS CONTINUOUS
Status: DISCONTINUED | OUTPATIENT
Start: 2017-04-18 | End: 2017-04-18 | Stop reason: HOSPADM

## 2017-04-18 RX ADMIN — FAMOTIDINE 20 MG: 10 INJECTION, SOLUTION INTRAVENOUS at 09:09

## 2017-04-18 RX ADMIN — SODIUM CHLORIDE, SODIUM LACTATE, POTASSIUM CHLORIDE, AND CALCIUM CHLORIDE 125 ML/HR: 600; 310; 30; 20 INJECTION, SOLUTION INTRAVENOUS at 13:58

## 2017-04-18 RX ADMIN — MIDAZOLAM HYDROCHLORIDE 2 MG: 1 INJECTION, SOLUTION INTRAMUSCULAR; INTRAVENOUS at 10:07

## 2017-04-18 RX ADMIN — HYDROMORPHONE HYDROCHLORIDE 0.5 MG: 1 INJECTION, SOLUTION INTRAMUSCULAR; INTRAVENOUS; SUBCUTANEOUS at 16:13

## 2017-04-18 RX ADMIN — Medication 3 MG: at 12:09

## 2017-04-18 RX ADMIN — ROCURONIUM BROMIDE 50 MG: 10 INJECTION, SOLUTION INTRAVENOUS at 10:14

## 2017-04-18 RX ADMIN — CEFAZOLIN 2 G: 10 INJECTION, POWDER, FOR SOLUTION INTRAVENOUS; PARENTERAL at 17:41

## 2017-04-18 RX ADMIN — NYSTATIN 500000 UNITS: 100000 SUSPENSION ORAL at 08:58

## 2017-04-18 RX ADMIN — GLYCOPYRROLATE 0.4 MG: 0.2 INJECTION INTRAMUSCULAR; INTRAVENOUS at 12:09

## 2017-04-18 RX ADMIN — SODIUM CHLORIDE, SODIUM LACTATE, POTASSIUM CHLORIDE, AND CALCIUM CHLORIDE 150 ML/HR: 600; 310; 30; 20 INJECTION, SOLUTION INTRAVENOUS at 21:14

## 2017-04-18 RX ADMIN — KETOROLAC TROMETHAMINE 30 MG: 30 INJECTION, SOLUTION INTRAMUSCULAR; INTRAVENOUS at 12:02

## 2017-04-18 RX ADMIN — KETOROLAC TROMETHAMINE 30 MG: 30 INJECTION, SOLUTION INTRAMUSCULAR at 17:40

## 2017-04-18 RX ADMIN — ACETAMINOPHEN 1000 MG: 10 INJECTION, SOLUTION INTRAVENOUS at 10:25

## 2017-04-18 RX ADMIN — ONDANSETRON 4 MG: 2 INJECTION INTRAMUSCULAR; INTRAVENOUS at 16:12

## 2017-04-18 RX ADMIN — SODIUM CHLORIDE, SODIUM LACTATE, POTASSIUM CHLORIDE, AND CALCIUM CHLORIDE: 600; 310; 30; 20 INJECTION, SOLUTION INTRAVENOUS at 11:55

## 2017-04-18 RX ADMIN — FENTANYL CITRATE 50 MCG: 50 INJECTION, SOLUTION INTRAMUSCULAR; INTRAVENOUS at 11:19

## 2017-04-18 RX ADMIN — SODIUM CHLORIDE, SODIUM LACTATE, POTASSIUM CHLORIDE, AND CALCIUM CHLORIDE: 600; 310; 30; 20 INJECTION, SOLUTION INTRAVENOUS at 10:46

## 2017-04-18 RX ADMIN — FENTANYL CITRATE 150 MCG: 50 INJECTION, SOLUTION INTRAMUSCULAR; INTRAVENOUS at 10:12

## 2017-04-18 RX ADMIN — HYDROMORPHONE HYDROCHLORIDE 1 MG: 1 INJECTION, SOLUTION INTRAMUSCULAR; INTRAVENOUS; SUBCUTANEOUS at 21:12

## 2017-04-18 RX ADMIN — SODIUM CHLORIDE, SODIUM LACTATE, POTASSIUM CHLORIDE, AND CALCIUM CHLORIDE 125 ML/HR: 600; 310; 30; 20 INJECTION, SOLUTION INTRAVENOUS at 09:05

## 2017-04-18 RX ADMIN — ACETAMINOPHEN 1000 MG: 10 INJECTION, SOLUTION INTRAVENOUS at 21:12

## 2017-04-18 RX ADMIN — PROPOFOL 200 MG: 10 INJECTION, EMULSION INTRAVENOUS at 10:14

## 2017-04-18 RX ADMIN — LIDOCAINE HYDROCHLORIDE 100 MG: 20 INJECTION, SOLUTION EPIDURAL; INFILTRATION; INTRACAUDAL; PERINEURAL at 10:14

## 2017-04-18 RX ADMIN — SODIUM CHLORIDE 6.25 MG: 9 INJECTION INTRAMUSCULAR; INTRAVENOUS; SUBCUTANEOUS at 13:59

## 2017-04-18 RX ADMIN — ACETAMINOPHEN 1000 MG: 10 INJECTION, SOLUTION INTRAVENOUS at 16:02

## 2017-04-18 RX ADMIN — HYDROMORPHONE HYDROCHLORIDE 1 MG: 2 INJECTION, SOLUTION INTRAMUSCULAR; INTRAVENOUS; SUBCUTANEOUS at 11:47

## 2017-04-18 RX ADMIN — FENTANYL CITRATE 50 MCG: 50 INJECTION, SOLUTION INTRAMUSCULAR; INTRAVENOUS at 12:18

## 2017-04-18 RX ADMIN — CEFAZOLIN 2 G: 10 INJECTION, POWDER, FOR SOLUTION INTRAVENOUS; PARENTERAL at 10:20

## 2017-04-18 RX ADMIN — ONDANSETRON 4 MG: 2 INJECTION INTRAMUSCULAR; INTRAVENOUS at 12:04

## 2017-04-18 RX ADMIN — ENOXAPARIN SODIUM 40 MG: 40 INJECTION SUBCUTANEOUS at 08:58

## 2017-04-18 RX ADMIN — SODIUM CHLORIDE, SODIUM LACTATE, POTASSIUM CHLORIDE, AND CALCIUM CHLORIDE 150 ML/HR: 600; 310; 30; 20 INJECTION, SOLUTION INTRAVENOUS at 15:30

## 2017-04-18 RX ADMIN — ENOXAPARIN SODIUM 40 MG: 40 INJECTION SUBCUTANEOUS at 21:12

## 2017-04-18 NOTE — PROGRESS NOTES
1515 - Bedside report received from PACU, RN. Patient A&O x4. Resting comfortably with no c/o pain, sob, distress noted. Family at bedside. Plan of care for today is walking, pain control, NPO with ice chips, IV ABX, IVF. 1615 - Pt has 7/10 pain with nausea. Dilaudid and Zofran given. 1815 - Patient ambulating around RN station. 250 feet. Tolerated well. Shift uneventful. Ambulation x1, ABX begun. Room Air. Patient Vitals for the past 8 hrs:   Temp Pulse Resp BP SpO2   04/18/17 1815 98.3 °F (36.8 °C) 74 16 116/62 100 %   04/18/17 1714 98.1 °F (36.7 °C) 70 16 118/64 99 %   04/18/17 1613 98.2 °F (36.8 °C) 72 16 122/63 98 %   04/18/17 1550 98.1 °F (36.7 °C) 68 16 113/59 100 %   04/18/17 1501 98.2 °F (36.8 °C) 70 14 115/60 98 %   04/18/17 1440 - 80 15 102/58 99 %   04/18/17 1435 - 76 15 112/55 98 %   04/18/17 1430 - 74 15 111/65 97 %   04/18/17 1425 - 69 16 115/63 98 %   04/18/17 1420 - 74 16 123/53 98 %   04/18/17 1415 - 72 14 116/57 97 %   04/18/17 1410 - 73 13 110/64 96 %   04/18/17 1405 - 74 12 113/60 94 %   04/18/17 1400 - 72 11 116/60 99 %   04/18/17 1355 - 81 13 118/60 100 %   04/18/17 1350 - 71 9 115/76 98 %   04/18/17 1345 - 74 10 116/62 100 %   04/18/17 1340 - 71 12 122/55 100 %   04/18/17 1335 - 72 13 121/57 99 %   04/18/17 1330 - 69 11 120/60 100 %   04/18/17 1325 - 66 10 115/58 99 %   04/18/17 1320 - 75 9 117/65 98 %   04/18/17 1315 - 63 9 117/54 100 %   04/18/17 1310 - 69 10 115/58 99 %   04/18/17 1303 - 66 9 110/65 100 %   04/18/17 1255 - 67 8 115/46 100 %   04/18/17 1250 - 83 14 114/67 95 %   04/18/17 1245 - 71 12 109/59 98 %   04/18/17 1240 - 89 16 113/57 100 %       Bedside and Verbal shift change report given to Miguelina Jackson RN (oncoming nurse) by Marilou Gutierrez RN (off going nurse). Report included the following information SBAR, Kardex, Intake/Output, MAR and Recent Results.

## 2017-04-18 NOTE — IP AVS SNAPSHOT
[address] [address] 
[phone] Patient: Florencio Silva MRN: [id number] :1980 You are allergic to the following No active allergies Recent Documentation Height Weight Breastfeeding? BMI OB Status Smoking Status 1.575 m 103.1 kg No 41.59 kg/m2 Having regular periods Never Smoker Emergency Contacts Name Discharge Info Relation Home Work Mobile Hitesh Chew DISCHARGE CAREGIVER [3] Spouse [3] [phone] About your hospitalization You were admitted on:  2017 You last received care in the:  [address] [place] [address] You were discharged on:  2017 Unit phone number:  [phone] Why you were hospitalized Your primary diagnosis was:  Not on File Your diagnoses also included:  [place] Stricture Of Stomach Providers Seen During Your Hospitalizations Provider Role Specialty Primary office phone Francisca Kauffman MD Attending Provider General Surgery [phone] Your Primary Care Physician (PCP) Primary Care Physician Office Phone Office Fax Lynnette Mcgrath [facility]  [phone] Follow-up Information Follow up With Details Comments Contact Info Francisca Kauffman MD On 5/3/2017 Follow up appointment scheduled for May 3, 2017 at 1:30 p.m. [address] [place] [address] [address] [place] [address] 
[phone] Diya Cooper MD   [address] [place] [address] 
[phone] Your Appointments Wednesday May 03, 2017  1:30 PM EDT  
POST OP with [facility] Kaykay Day ([address])  
 [address] [place] [address] [address] [facility] 150  
[phone] Wednesday May 17, 2017  1:00 PM EDT Office Visit with [facility] Kaykay Day ([address] [facility]) [address] [place] [address] [address] [facility]  
[phone] Wednesday May 17, 2017  1:30 PM EDT Office Visit with TOMAS NUTRI VISIT PEN [facility] Nica Jackson ([facility])  
 [address] [address] [address]  
[phone] Current Discharge Medication List  
  
START taking these medications Dose & Instructions Dispensing Information Comments Morning Noon Evening Bedtime  
 ondansetron 4 mg disintegrating tablet Commonly known as:  ZOFRAN ODT Your last dose was: Your next dose is:    
   
   
 Dose:  4 mg Take 1 Tab by mouth every eight (8) hours as needed for Nausea. Quantity:  30 Tab Refills:  1 CONTINUE these medications which have NOT CHANGED Dose & Instructions Dispensing Information Comments Morning Noon Evening Bedtime  
 multivitamin with iron chewable tablet Commonly known as:  Armando Gitsaeedman Your last dose was: Your next dose is:    
   
   
 Dose:  1 Tab Take 1 Tab by mouth two (2) times a day. Refills:  0  
     
   
   
   
  
 oxyCODONE-acetaminophen 5-325 mg per tablet Commonly known as:  PERCOCET Your last dose was: Your next dose is:    
   
   
 Dose:  1 Tab Take 1 Tab by mouth every four (4) hours as needed for Pain. Max Daily Amount: 6 Tabs. Quantity:  30 Tab Refills:  0 STOP taking these medications CALCIUM 600 + D 600-125 mg-unit Tab Generic drug:  calcium-cholecalciferol (d3) CLARITIN 10 mg tablet Generic drug:  loratadine EXCEDRIN MIGRAINE PO  
   
  
 TUMS PO  
   
  
 TYLENOL 325 mg tablet Generic drug:  acetaminophen Where to Get Your Medications Information on where to get these meds will be given to you by the nurse or doctor. ! Ask your nurse or doctor about these medications ondansetron 4 mg disintegrating tablet Discharge Instructions [facility] Donnell Sen M.D., F.A.C.S. 
[address] [place] [address] Drive. [address] Lovett, [facility] Office: [phone]    Fax:  [phone] Discharge Instruction for Gastric Bypass / Sleeve Gastrectomy Patients Diet: 
? Continue with the liquid diet until you are seen in the office. Make sure you sip fluids all day. Goal for total fluid intake is at least 64 ounces per day. Aim for  Gms of protein every day. Activity: 
? Walking is encouraged. Rest when you are tired. ? You may shower. ? You may climb stairs. Take your time. ? No lifting more than 10-15 lbs. ? If you feel discomfort during an activity, rest. 
? Do not drive for 1 week or until off of all narcotics. Wound Care: ? Clean incisions with soap and water when in the shower. Pat dry. ? Leave steri-strips on until they fall off. ? A small amount of drainage may be present from the incisions. Contact the office if you notice an increase in drainage, an odor, increased redness, or fever > 100.5. Medications: 
? You will receive a prescription for pain medication and Prilosec at the time of discharge. ? For upset stomach you may take over the counter medications such as Maalox, Mylanta, Pepcid, Zantac, or Tagamet. ? Gas X works very well for bloating when eating or drinking ? You may take Tylenol 
? Non-aspirin based arthritis medications may be resumed ? Take a childrens or adult chewable multivitamin. ? Milk of Magnesia will help with constipation. ? It is fine to take your usual home medications. Blood pressure medications should be continued after surgery. Diabetic medications can frequently be reduced very quickly after surgery. Diabetics should continue to monitor blood sugars frequently after surgery and contact the prescribing physician for any questions. Follow-Up Appointment: ? If you do not already have a follow-up appointment scheduled, contact the office in the next few days to obtain one. It is usually scheduled for 10-14 days after you surgery date. Office phone number:  [phone]. REV  01/2017 Discharge Orders None MyChart Announcement We are excited to announce that we are making your provider's discharge notes available to you in MyAGENT. You will see these notes when they are completed and signed by the physician that discharged you from your recent hospital stay. If you have any questions or concerns about any information you see in MyAGENT, please call the Health Information Department where you were seen or reach out to your Primary Care Provider for more information about your plan of care. Introducing [facility]! [facility] introduces MyAGENT patient portal. Now you can access parts of your medical record, email your doctor's office, and request medication refills online. 1. In your internet browser, go to [email or web address] 2. Click on the First Time User? Click Here link in the Sign In box. You will see the New Member Sign Up page. 3. Enter your MyAGENT Access Code exactly as it appears below. You will not need to use this code after youve completed the sign-up process. If you do not sign up before the expiration date, you must request a new code. · MyAGENT Access Code: [email or web address] Expires: 5/21/2017  9:44 AM 
 
4. Enter the last four digits of your Social Security Number (xxxx) and Date of Birth (mm/dd/yyyy) as indicated and click Submit. You will be taken to the next sign-up page. 5. Create a [employer] ID. This will be your MyAGENT login ID and cannot be changed, so think of one that is secure and easy to remember. 6. Create a MyAGENT password. You can change your password at any time. 7. Enter your Password Reset Question and Answer.  This can be used at a later time if you forget your password. 8. Enter your e-mail address. You will receive e-mail notification when new information is available in [address] 9. Click Sign Up. You can now view and download portions of your medical record. 10. Click the Download Summary menu link to download a portable copy of your medical information. If you have questions, please visit the Frequently Asked Questions section of the [employer] website. Remember, [employer] is NOT to be used for urgent needs. For medical emergencies, dial 911. Now available from your iPhone and Android! General Information Please provide this summary of care documentation to your next provider. Patient Signature:  ____________________________________________________________ Date:  ____________________________________________________________  
  
[facility] Provider Signature:  ____________________________________________________________ Date:  ____________________________________________________________

## 2017-04-18 NOTE — ROUTINE PROCESS
TRANSFER - IN REPORT:    Verbal report received from [facility], RN(name) on Rajendra Herrera  being received from PACU (unit) for routine progression of care      Report consisted of patients Situation, Background, Assessment and   Recommendations(SBAR). Information from the following report(s) SBAR, OR Summary, Procedure Summary, Intake/Output and MAR was reviewed with the receiving nurse. Opportunity for questions and clarification was provided. Assessment completed upon patients arrival to unit and care assumed.

## 2017-04-18 NOTE — NURSE NAVIGATOR
Doing well. Denies nausea. Pain = 4-5. Tolerating ice chips. Dressings dry and intact. Adequate urinary output. NATTY output WNL. Encouraged to cough, deep breathe, and use spirometer every hour while awake. Encouraged to be out of bed ambulating this evening. Discussed diet and activity progression tomorrow after UGI.

## 2017-04-18 NOTE — PERIOP NOTES
TRANSFER - OUT REPORT:    Verbal report given to [facility] RN on Ava Osorio  being transferred to  for routine post - op       Report consisted of patients Situation, Background, Assessment and   Recommendations(SBAR). Information from the following report(s) Kardex, OR Summary, Procedure Summary, Intake/Output and MAR was reviewed with the receiving nurse. Lines:   Peripheral IV 04/18/17 Right Hand (Active)   Site Assessment Clean, dry, & intact 4/18/2017 12:23 PM   Phlebitis Assessment 0 4/18/2017 12:23 PM   Infiltration Assessment 0 4/18/2017 12:23 PM   Dressing Status Clean, dry, & intact 4/18/2017 12:23 PM   Dressing Type Tape;Transparent 4/18/2017 12:23 PM   Hub Color/Line Status Green; Infusing 4/18/2017 12:23 PM        Opportunity for questions and clarification was provided.       Patient transported with:   O2 @ 2 liters   Tech and [facility]

## 2017-04-18 NOTE — ANESTHESIA PREPROCEDURE EVALUATION
Anesthetic History     PONV          Review of Systems / Medical History  Patient summary reviewed, nursing notes reviewed and pertinent labs reviewed    Pulmonary  Within defined limits                 Neuro/Psych   Within defined limits           Cardiovascular                  Exercise tolerance: >4 METS     GI/Hepatic/Renal     GERD           Endo/Other        Morbid obesity and arthritis     Other Findings              Physical Exam    Airway  Mallampati: II  TM Distance: 4 - 6 cm  Neck ROM: normal range of motion   Mouth opening: Normal     Cardiovascular  Regular rate and rhythm,  S1 and S2 normal,  no murmur, click, rub, or gallop             Dental  No notable dental hx       Pulmonary  Breath sounds clear to auscultation               Abdominal  GI exam deferred       Other Findings            Anesthetic Plan    ASA: 3  Anesthesia type: general          Induction: Intravenous  Anesthetic plan and risks discussed with: Patient

## 2017-04-18 NOTE — H&P (VIEW-ONLY)
Gastric Bypass - History and Physical    Subjective: The patient is a 43 y.o. obese female with a Body mass index is 40.32 kg/(m^2). Teo Darylpancho The patient is currently her heaviest weight for the past 3 years. she has been   overweight since childhood. she has been considering surgery since having her gastric band removed due to a slip in [place] in 2013 after two years with   no resulted weight loss. She has been experiencing additionally a sticking sensation when she eats. She underwent an UGI and this showed almost 2 compartments   of her stomach with a smaller proximal pouch narrowing severely into a much larger gastric body and pre pyloric region. EGD performed confirmed the   fact that she either has a proximal stricture or persistent gastric prolapse causing partial obstruction. She now presents for correction of her issue.        Operative report reviewed - date of band removal 0/65/1968 - plication wrap was not taken down (simple band removal only)     Patient Active Problem List   Diagnosis Code    Morbid obesity with BMI of 40.0-44.9, adult ([address] E66.01, Z68.41    Morbid obesity ([address] E66.01    History of adjustable gastric banding Z98.84    Migraine G43.909    Chronic back pain M54.9, G89.29    AVELINO (stress urinary incontinence, female) N39.3    PCOS (polycystic ovarian syndrome) E28.2    Irregular menses N92.6    GERD (gastroesophageal reflux disease) K21.9    Arthritic-like pain M25.50    Hourglass stricture of stomach K31.2       They have been generally well prior to this visit and have had no recent significant illnesses. The patient has had no gastrointestinal issues that would preclude them   from proceeding with the surgery they have chosen. Claudio Alma has recently tried a preoperative weight loss program  in addition to seeing a bariatric nutritionist   preoperatively.  We have discussed on at least one other occasion about the various types of surgical weight loss procedures and they have considered these options   after our initial consultation. We have once again discussed these procedures in detail and they have now decided on a surgical procedure. They present today to discuss   this and confirm that their evaluation pre operatively is acceptable to continue with surgery. The patient desires laparoscopic gastric bypass surgery  As a conversion procedure for surgical weight loss and correction of her problem. The patients goal weight is 140lb. These goals are consistent with expected outcomes of their desired operation. her Medical goals are resolution of these health issues. Patient Active Problem List    Diagnosis Date Noted    Hourglass stricture of stomach     Morbid obesity with BMI of 40.0-44.9, adult ([address]     Morbid obesity ([address]     History of adjustable gastric banding     Migraine     Chronic back pain     AVELINO (stress urinary incontinence, female)     PCOS (polycystic ovarian syndrome)     Irregular menses     GERD (gastroesophageal reflux disease)     Arthritic-like pain       Past Surgical History:   Procedure Laterality Date    HX GI  2011    placment of gastric band in [address] [place] [address] HX GI  2013    removal of gastric band in [place] (not original surgeon)    HX HEENT      mulitple tympanic membrane surgeries as a child    HX TONSIL AND ADENOIDECTOMY      HX TUBAL LIGATION        Social History   Substance Use Topics    Smoking status: Never Smoker    Smokeless tobacco: Never Used    Alcohol use No      Family History   Problem Relation Age of Onset    Arthritis-osteo Mother     Arthritis-osteo Father     Coronary Artery Disease Father       Current Outpatient Prescriptions   Medication Sig Dispense Refill    acetaminophen (TYLENOL) 325 mg tablet Take 325 mg by mouth every four (4) hours as needed for Pain.  calcium-cholecalciferol, d3, (CALCIUM 600 + D) 600-125 mg-unit tab Take  by mouth.       CALCIUM CARBONATE (TUMS PO) Take  by mouth.  multivitamin with iron (FLINTSTONES) chewable tablet Take 1 Tab by mouth two (2) times a day.  loratadine (CLARITIN) 10 mg tablet Take 10 mg by mouth daily as needed for Allergies.  oxyCODONE-acetaminophen (PERCOCET) 5-325 mg per tablet Take 1 Tab by mouth every four (4) hours as needed for Pain. Max Daily Amount: 6 Tabs. 30 Tab 0    ASPIRIN/ACETAMINOPHEN/CAFFEINE (EXCEDRIN MIGRAINE PO) Take  by mouth.        No Known Allergies       Review of Systems:          General - No history or complaints of unexpected fever, chills, or weight loss  Head/Neck - No history or complaints of headache, diplopia, dysphagia, hearing loss  Cardiac - No history or complaints of chest pain, palpitations, murmur, or shortness of breath  Pulmonary - No history or complaints of shortness of breath, productive cough, hemoptysis  Gastrointestinal - severe reflux,no  abdominal pain, obstipation/constipation or blood per rectum  Genitourinary - No history or complaints of hematuria/dysuria, stress urinary incontinence symptoms, or renal lithiasis  Musculoskeletal - no joint pain ,  no muscular weakness  Hematologic - No history or complaints of bleeding disorders,  No blood transfusions  Neurologic - No history or complaints of  migraine headaches, seizure activity, syncopal episodes, TIA or stroke  Integumentary - No history or complaints of rashes, abnormal nevi, skin cancer  Gynecological - normal             Objective:     Visit Vitals    /64 (BP 1 Location: Left arm, BP Patient Position: Sitting)    Pulse 83    Resp 16    Ht 5' 2\" (1.575 m)    Wt 104.3 kg (230 lb)    LMP 03/21/2017    SpO2 98%    BMI 42.07 kg/m2       Physical Examination: General appearance - alert, well appearing, and in no distress and oriented to person, place, and time  Mental status - alert, oriented to person, place, and time, normal mood, behavior, speech, dress, motor activity, and thought processes  Eyes - pupils equal and reactive, extraocular eye movements intact, sclera anicteric, left eye normal, right eye normal  Ears - bilateral TM's and external ear canals normal, right ear normal, left ear normal  Nose - normal and patent, no erythema, discharge or polyps  Mouth - mucous membranes moist, pharynx normal without lesions  Neck - supple, no significant adenopathy  Lymphatics - no palpable lymphadenopathy, no hepatosplenomegaly  Chest - clear to auscultation, no wheezes, rales or rhonchi, symmetric air entry  Heart - normal rate, regular rhythm, normal S1, S2, no murmurs, rubs, clicks or gallops  Abdomen - soft, nontender, nondistended, no masses or organomegaly  Back exam - full range of motion, no tenderness, palpable spasm or pain on motion  Neurological - alert, oriented, normal speech, no focal findings or movement disorder noted  Musculoskeletal - no joint tenderness, deformity or swelling  Extremities - peripheral pulses normal, no pedal edema, no clubbing or cyanosis    Labs :     Lab Results   Component Value Date/Time    WBC 6.7 04/10/2017 12:24 PM    HGB 12.0 04/10/2017 12:24 PM    HCT 35.7 04/10/2017 12:24 PM    PLATELET 662 33/41/3018 12:24 PM    MCV 86.2 04/10/2017 12:24 PM     Lab Results   Component Value Date/Time    Sodium 142 04/10/2017 12:24 PM    Potassium 4.1 04/10/2017 12:24 PM    Chloride 103 04/10/2017 12:24 PM    CO2 32 04/10/2017 12:24 PM    Anion gap 7 04/10/2017 12:24 PM    Glucose 80 04/10/2017 12:24 PM    BUN 14 04/10/2017 12:24 PM    Creatinine 0.71 04/10/2017 12:24 PM    BUN/Creatinine ratio 20 04/10/2017 12:24 PM    GFR est AA >60 04/10/2017 12:24 PM    GFR est non-AA >60 04/10/2017 12:24 PM    Calcium 8.9 04/10/2017 12:24 PM    Bilirubin, total 0.4 04/10/2017 12:24 PM    AST (SGOT) 68 04/10/2017 12:24 PM    Alk.  phosphatase 51 04/10/2017 12:24 PM    Protein, total 7.6 04/10/2017 12:24 PM    Albumin 4.1 04/10/2017 12:24 PM    Globulin 3.5 04/10/2017 12:24 PM A-G Ratio 1.2 04/10/2017 12:24 PM    ALT (SGPT) 62 04/10/2017 12:24 PM     No results found for: IRON, FE, TIBC, IBCT, PSAT, FERR  No results found for: FOL, RBCF  No results found for: Yakelin Gallardo            Cardiac / Pulmonary Evaluation:         UGI Results:   Name: Jaimie Cole  MR#: [id number]  : 1980  Account #: [de-identified]  Date of Adm: 2017  Date of Surgery: 2017        AMENDED REPORT: Revised CSN 2017/bjs           PREOPERATIVE DIAGNOSIS: This patient is status post gastric band  removal in [place] in  with persistent morbid obesity for  possible conversion procedure.     POSTOPERATIVE DIAGNOSIS: This patient is status post gastric  band removal in [place] in  with persistent morbid obesity  for possible conversion procedure with abnormal proximal stomach of  unclear etiology, possible stricture at prior lap band.     PROCEDURE PERFORMED: Upper gastrointestinal study with  barium.     ESTIMATED BLOOD LOSS: None.     SPECIMENS REMOVED: None.     ANESTHESIA: None.     STATEMENT ASSESSMENT: The patient is a 78-year-old female who  had a gastric band placed in [place] in the past. She ultimately  had the band removed for chronic dysphagia and what she was told  was an early slip. She finally presented to us in consultation for  possible conversion procedure to another operation due to persistent  morbid obesity. She is here today for upper GI study to assess her  prior operation and to see whether or not her gastric anatomy is  normal. Of note is the fact that we did obtain a copy of her Lap-Band  removal operative report and the surgeon there in [place] did  not take down her plication from her Lap-Band.     PROCEDURE: The patient was brought to the fluoroscopy unit. After  swallow barium during the exam, she was noted to have normal  peristalsis of her esophagus.  She had filling of the distal esophagus it  tapered into and through the gastroesophageal junction. Contrast then  pooled in what appeared to be the proximal stomach. There was  considerable delay of contrast at that level in the cardia of the stomach  with essentially no progression for several minutes past a certain point. Finally with additional sips of barium, we were able to get some  contrast to enter the body and then the prepyloric region of the  stomach, but definitely there was some abnormality of her proximal  stomach. It is unclear as to whether or not there is a stricture at the  prior gastric band placement site or if this is simply the plication that we  are seeing or it could be that she had a significant slip and we are  seeing abnormality related to that. In any case, we will need to perform  an endoscopy on the patient to better delineate her anatomy.           Tati Johnson MD      EGD Report    Homar Means MD   General Surgery   Pre-procedure Diagnoses:   1. Dysphagia, unspecified type [R13.10]   Post-procedure Diagnoses:   1. Esophagitis [K20.9]   Procedures:   1. EGD [GJK9390 (Custom)]   2. BIOPSY OF STOMACH [76885 (CPT®)]      []Hide copied text  Esophagogastroduodenoscopy Procedure Note     Indications: prior gastric band pt from [place] (band removed 20130 seeking sleeve conversion / has some cont dysphagia despite band being removed     Anesthesia/Sedation: Versed 8 mg IV and Fentanyl 100 mcg IV     Pre-Procedure Exam:  Airway: clear   Heart: normal S1and S2   Lungs: clear bilateral  Abdomen: soft, nontender, bowel sounds present and normal in all quadrants   Mental Status: awake, alert, and oriented to person, place, and time      Procedure in Detail:  Informed consent was obtained for the procedure, including conscious sedation. Risks of pancreatitis, infection, perforation, hemorrhage, adverse drug reaction, and aspiration were discussed. The patient was placed in the left lateral decubitus position.  Based on the pre-procedure assessment, including review of the patient's medical history, medications, allergies, and review of systems, he had been deemed to be an appropriate candidate for moderate sedation; he was therefore sedated with the medications listed above. He was monitored continuously with electrocardiogram tracing, pulse oximetry, blood pressure monitoring, and direct observation.      The [id number] gastroscope was inserted into the mouth and advanced under direct vision to the antrum. A careful inspection was made as the gastroscope was withdrawn, including a retroflexed view of the proximal stomach; findings and interventions are described below. Appropriate photodocumentation was obtained.     Findings:         Oropharynx - normal mucosa without abnormality  Esophagus - normal caliber with mild esophagitis  Stomach - structurally altered (appearance of a two chambered stomach) given her present band plication, however no other abnormalities  Duodenum - unable to penetrate pylorus  Therapies:   biopsy of stomach antrum     Specimens: Specimens were collected and sent to pathology.      Complications: None; patient tolerated the procedure well.     EBL:  None      Attending Attestation: I performed the procedure.                Assessment:     Morbid obesity with associated comorbidity and likely stricture of stomach due to incompletely released gastric prolapse    Plan:     laparoscopic gastric bypass surgery as a conversion procedure    This is a 43 y.o. female with a BMI of Body mass index is 42.07 kg/(m^2). and the weight-related co-morbidties as noted above. [facility] meets the NIH criteria for bariatric surgery   based upon the BMI of Body mass index is 42.07 kg/(m^2). and multiple weight-related co-morbidties.  Austin Carballo has elected laparoscopic gastric bypass as her intervention of choice   for treatment of morbid obestiy through surgical means secondary to its uniform results,  profound baseline suppression of hunger and pace at which weight is lost.    In the office today, following Yadira's history and physical examination, a 40 minute discussion regarding the anatomic alterations for the laparoscopic gastric bypass  was undertaken. The dietary expectations and the patient  dependent factors for success were thoroughly discussed, to include the need for interval follow-up and long-term dietary changes associated   with success. The possible short and long term  complications of the gastric bypass were also discussed, to include but not limited to;death, DVT/PE, staple line leak, bleeding, stricture   formation, infection,internal hernia  and pouch dilation. Specific weight related outcomes for success were also discussed with an emphasis on careful and close follow-up with the first   year and Dietary behavior modification over the first years as baseline cyclical hunger returns  The patient expressed an understanding of the above factors, and her questions were answered in their entirety. Today, the patient had all of her questions answered and the decision was made today that the patient's preoperative evaluation is acceptable for them  to proceed with   bariatric surgery  choosing  gastric bypass as her surgical option as a conversion procedure. The patient understands that there are many unknowns with her procedure particularly why we are seeing  such an abnormal anatomical situation. My guess is that she had a   severe slip that really never resolved due to her plication wrap not having been taken down. The plan is for exploration and conversion to a gastric bypass if feasible. If her anatomy is too significantly altered then we will need to simply take down her plication and come   back another day for her gastric bypass procedure.     Secondary Diagnoses:     DVT / Pulmonary Embolus Risk - The patient is at a higher risk for post operative DVT / pulmonary embolus secondary to their morbid obese status, relative sedentary lifestyle, and impending general anesthetic. We will plan   to use anticoagulation therapy pre and post operative as well as TEDs and  pneumatic compression devices and encourage ambulation once on the hospital nursing floor. The need for possible at home anticoagulation therapy   has also been discussed and any decision on this matter will be made during post operative evaluations. The patient understands that their efforts at ambulation are of vital importance to reduce the risk of this complication thus   placing significant burden on them as to the prevention of such issues. Signs and symptoms of DVT / PE have been discussed with the patient and they have been instructed to call the office if any these occur in the \"at home\" post op phase. GERD -The patient understands that weight loss surgery is not a guaranteed cure for reflux disease but does understand the benefits that weight loss can have on reflux disease.  They also understand that at the time of surgery   the gastroesophageal junction will be evaluated for the presence of a diaphragmatic hernia.  Hernias will be corrected always with the gastric band and sleeve gastrectomy procedures, but only on a case by case basis with the   gastric bypass if it prevents our ability to perform the operation at hand, or if I feel that they would benefit long term with correction of this issue.  The patient also understands that neither weight loss surgery nor repair of a   diaphragmatic hernia repair guarantees the complete cessation of the disease. They also understand there is a possibility of recurrence with a simple crural repair as is performed with these procedures. They understand they   may have to continue their medications in the postoperative period.  They have a good understanding that the gastric bypass procedure is better suited to total resolution of this issue and that neither the Lap Band nor sleeve   gastrectomy is considered a curative procedure as it pertains to this diagnosis.     Polycystic Ovarian Syndrome -The patient does understand the association between obesity and the development of PCOS.  They understand that weight loss can often improve symptoms and in many cases cure the disease.    If the disease is associated with infertility this too is often corrected with adequate weight loss.  The patient understands that any change to the pharmaceutical treatment of her PCOS will be managed by the primary care physician or OB/GYN      Signed By: Lionel Llanos MD     April 12, 2017

## 2017-04-18 NOTE — IP AVS SNAPSHOT
Current Discharge Medication List  
  
START taking these medications Dose & Instructions Dispensing Information Comments Morning Noon Evening Bedtime  
 ondansetron 4 mg disintegrating tablet Commonly known as:  ZOFRAN ODT Your last dose was: Your next dose is:    
   
   
 Dose:  4 mg Take 1 Tab by mouth every eight (8) hours as needed for Nausea. Quantity:  30 Tab Refills:  1 CONTINUE these medications which have NOT CHANGED Dose & Instructions Dispensing Information Comments Morning Noon Evening Bedtime  
 multivitamin with iron chewable tablet Commonly known as:  Jesusita Harmon Your last dose was: Your next dose is:    
   
   
 Dose:  1 Tab Take 1 Tab by mouth two (2) times a day. Refills:  0  
     
   
   
   
  
 oxyCODONE-acetaminophen 5-325 mg per tablet Commonly known as:  PERCOCET Your last dose was: Your next dose is:    
   
   
 Dose:  1 Tab Take 1 Tab by mouth every four (4) hours as needed for Pain. Max Daily Amount: 6 Tabs. Quantity:  30 Tab Refills:  0 STOP taking these medications CALCIUM 600 + D 600-125 mg-unit Tab Generic drug:  calcium-cholecalciferol (d3) CLARITIN 10 mg tablet Generic drug:  loratadine EXCEDRIN MIGRAINE PO  
   
  
 TUMS PO  
   
  
 TYLENOL 325 mg tablet Generic drug:  acetaminophen Where to Get Your Medications Information on where to get these meds will be given to you by the nurse or doctor. ! Ask your nurse or doctor about these medications  
  ondansetron 4 mg disintegrating tablet

## 2017-04-18 NOTE — INTERVAL H&P NOTE
H&P Update:  Evita Redman was seen and examined. History and physical has been reviewed. The patient has been examined.  There have been no significant clinical changes since the completion of the originally dated History and Physical.    Signed By: Joycelyn Hills MD     April 18, 2017 9:45 AM

## 2017-04-18 NOTE — ANESTHESIA POSTPROCEDURE EVALUATION
Post-Anesthesia Evaluation & Assessment    Visit Vitals    /60    Pulse 81    Temp 36.8 °C (98.2 °F)    Resp 13    Ht 5' 2\" (1.575 m)    Wt 103.1 kg (227 lb 6 oz)    SpO2 100%    BMI 41.59 kg/m2       Nausea/Vomiting: no nausea    Post-operative hydration adequate. Pain score (VAS): 2    Mental status & Level of consciousness: alert and oriented x 3    Neurological status: moves all extremities, sensation grossly intact    Pulmonary status: airway patent, no supplemental oxygen required    Complications related to anesthesia: none    Patient has met all discharge requirements.     Additional comments:        Cayetano Miller MD

## 2017-04-18 NOTE — BRIEF OP NOTE
BRIEF OPERATIVE NOTE    Date of Procedure: 4/18/2017   Preoperative Diagnosis: MORBID OBESITY,GERD,COMPLICATION OF ADJUSTABLE GASTRIC BAND, ELEVATED LIVER ENYZMES  Postoperative Diagnosis: MORBID OBESITY,GERD,COMPLICATION OF ADJUSTABLE GASTRIC BAND, ELEVATED LIVER ENYZMES    Procedure(s):  CONVERSION LAP BAND TO LAPAROSCOPIC GASTRIC BYPASS  LYSIS OF ADHESIONS  [address] HERNIA REPAIR  WEDGE LIVER BIOPSY  INTRAOPERATIVE ENDOSCOPY WITH BIOPSY  Surgeon(s) and Role:     * Jackson Paulino MD - Primary       Physician Assistant: JOSSE Caldwell    Surgical Staff:  Circ-1: Lia Will RN  Circ-2: Indra Cosme RN  Physician Assistant: JOSSE Caldwell  Scrub Tech-1: Jerald Grajeda  Scrub Tech-2: Jose AlfredoHALFPOPS Dave  Event Time In   Incision Start 1034   Incision Close 1212     Anesthesia: General   Estimated Blood Loss: 20 cc  Specimens:   ID Type Source Tests Collected by Time Destination   1 : Gastric cardia biopsy Preservative Stomach  Jackson Paulino MD 4/18/2017 1115 Pathology   2 : wedge liver Preservative Liver  Jackson Paulino MD 4/18/2017 1116 Pathology      Findings: NA   Complications: NA  Implants: * No implants in log *

## 2017-04-18 NOTE — INTERVAL H&P NOTE
H&P Update:  Harjeet Benitez was seen and examined. History and physical has been reviewed. The patient has been examined.  There have been no significant clinical changes since the completion of the originally dated History and Physical.    Signed By: Bob Tucker MD     April 18, 2017 9:39 AM

## 2017-04-18 NOTE — PERIOP NOTES
TRANSFER - IN REPORT:    Verbal report received from [address] and Daniela Sotelo RN on [facility]  being received from OR  (unit) for routine post - op      Report consisted of patients Situation, Background, Assessment and   Recommendations(SBAR). Information from the following report(s) Kardex, OR Summary, Procedure Summary, Intake/Output and MAR was reviewed with the receiving nurse. Opportunity for questions and clarification was provided. Assessment completed upon patients arrival to unit and care assumed.

## 2017-04-19 ENCOUNTER — APPOINTMENT (OUTPATIENT)
Dept: GENERAL RADIOLOGY | Age: 37
DRG: 621 | End: 2017-04-19
Attending: SPECIALIST
Payer: COMMERCIAL

## 2017-04-19 VITALS
TEMPERATURE: 98.8 F | BODY MASS INDEX: 41.84 KG/M2 | HEIGHT: 62 IN | DIASTOLIC BLOOD PRESSURE: 57 MMHG | OXYGEN SATURATION: 97 % | WEIGHT: 227.38 LBS | RESPIRATION RATE: 18 BRPM | HEART RATE: 73 BPM | SYSTOLIC BLOOD PRESSURE: 100 MMHG

## 2017-04-19 PROCEDURE — 74011250636 HC RX REV CODE- 250/636: Performed by: SPECIALIST

## 2017-04-19 PROCEDURE — 74240 X-RAY XM UPR GI TRC 1CNTRST: CPT

## 2017-04-19 PROCEDURE — 74011636320 HC RX REV CODE- 636/320: Performed by: SPECIALIST

## 2017-04-19 PROCEDURE — 74011250637 HC RX REV CODE- 250/637: Performed by: SPECIALIST

## 2017-04-19 RX ORDER — OXYCODONE AND ACETAMINOPHEN 5; 325 MG/1; MG/1
1 TABLET ORAL
Status: DISCONTINUED | OUTPATIENT
Start: 2017-04-19 | End: 2017-04-19 | Stop reason: HOSPADM

## 2017-04-19 RX ORDER — ONDANSETRON 4 MG/1
4 TABLET, ORALLY DISINTEGRATING ORAL
Qty: 30 TAB | Refills: 1 | Status: SHIPPED | OUTPATIENT
Start: 2017-04-19 | End: 2017-05-03

## 2017-04-19 RX ADMIN — CEFAZOLIN 2 G: 10 INJECTION, POWDER, FOR SOLUTION INTRAVENOUS; PARENTERAL at 01:02

## 2017-04-19 RX ADMIN — ENOXAPARIN SODIUM 40 MG: 40 INJECTION SUBCUTANEOUS at 09:51

## 2017-04-19 RX ADMIN — OXYCODONE HYDROCHLORIDE AND ACETAMINOPHEN 1 TABLET: 5; 325 TABLET ORAL at 09:51

## 2017-04-19 RX ADMIN — OXYCODONE HYDROCHLORIDE AND ACETAMINOPHEN 1 TABLET: 5; 325 TABLET ORAL at 18:00

## 2017-04-19 RX ADMIN — KETOROLAC TROMETHAMINE 30 MG: 30 INJECTION, SOLUTION INTRAMUSCULAR at 06:23

## 2017-04-19 RX ADMIN — KETOROLAC TROMETHAMINE 30 MG: 30 INJECTION, SOLUTION INTRAMUSCULAR at 00:57

## 2017-04-19 RX ADMIN — ACETAMINOPHEN 1000 MG: 10 INJECTION, SOLUTION INTRAVENOUS at 06:17

## 2017-04-19 RX ADMIN — HYDROMORPHONE HYDROCHLORIDE 0.5 MG: 1 INJECTION, SOLUTION INTRAMUSCULAR; INTRAVENOUS; SUBCUTANEOUS at 04:44

## 2017-04-19 RX ADMIN — ACETAMINOPHEN 1000 MG: 10 INJECTION, SOLUTION INTRAVENOUS at 13:26

## 2017-04-19 RX ADMIN — IOHEXOL 75 ML: 240 INJECTION, SOLUTION INTRATHECAL; INTRAVASCULAR; INTRAVENOUS; ORAL at 08:09

## 2017-04-19 RX ADMIN — KETOROLAC TROMETHAMINE 30 MG: 30 INJECTION, SOLUTION INTRAMUSCULAR at 13:26

## 2017-04-19 RX ADMIN — SODIUM CHLORIDE, SODIUM LACTATE, POTASSIUM CHLORIDE, AND CALCIUM CHLORIDE 150 ML/HR: 600; 310; 30; 20 INJECTION, SOLUTION INTRAVENOUS at 04:39

## 2017-04-19 RX ADMIN — ONDANSETRON 4 MG: 2 INJECTION INTRAMUSCULAR; INTRAVENOUS at 09:51

## 2017-04-19 NOTE — OP NOTES
[address] [place] [address]  OPERATIVE REPORT    Name:  Yeimy Schmitz  MR#:  [id number]  :  1980  Account #:  [de-identified]  Date of Adm:  2017  Date of Surgery:  2017      PREOPERATIVE DIAGNOSES:  1. Persistent morbid obesity, status post Lap-Band removal due to lap  band slip in the past.  2. 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 POSTOPERATIVE DIAGNOSES:  1. Persistent morbid obesity, status post Lap-Band removal due to lap  band slip in the past.  2. 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 3. Fatty infiltration of liver. PROCEDURES PERFORMED:  1. Laparoscopic lysis of adhesions of greater than 30 minutes'  duration. 2. Conversion of Lap-Band procedure to laparoscopic gastric bypass  with a 150-cm Fritz limb bypass in an antecolic antegastric fashion. 3. Diaphragmatic hernia repair. 4. Wedge liver biopsy. 5. Endoscopy with biopsy. SURGEON: Anthony Victoria MD    ASSISTANT: Sergey Vance PA-C    ANESTHESIA: General endotracheal.    COMPLICATIONS: None. ESTIMATED BLOOD LOSS: Minimal, less than 5 mL. SPECIMENS REMOVED:  1. Liver biopsy specimen. 2. Endoscopies biopsy specimen. STATEMENT OF MEDICAL NECESSITY: The patient is a 27-year-old  female who had a gastric band placed in [place]. She  subsequently had a lap band slip and had removed the Lap-Band by  surgeon there in [place]. She presented to me in consultation  for possible conversion surgery, but after reviewing her operative  report from that surgeon there, we noted that the patient had not had  her plication wrap taken down during that lap band removal. Therefore,  we asked her to come in for an upper GI study to assess her gastric  anatomy which she did.  The upper GI study was grossly normal, there  appeared to be stasis of contrast and what appeared to be a proximal  pouch and contrast emptied along a medial pathway in a very slow  fashion into a larger gastric region. It is unclear as to why this was  occurring, therefore, we went and performed an endoscopy on the  patient, indeed we found exactly the same thing. It appeared that she  had almost a stricture of her stomach with the pathway medially along  the lesser curvature of the stomach and this was of unclear etiology. I  talked with her about conversion operation and the fact that she did  have a hiatal hernia during her endoscopy and the best conversion  operation would be a gastric bypass in a situation such as hers, but we  were clear exactly why her anatomy was such and we would explore  her, try to convert her in a single staged fashion, but she underwent  that we might not be successful and we might have to simply release  the plication sutures and come back another day. She was comfortable  with all this plan and plan is to proceed. OPERATIVE PROCEDURE: The patient was brought to the operating  room, placed on the table in supine position. Some general anesthesia  was administered without any difficulty. The abdomen was prepped  and draped in the usual sterile fashion. Using a 15 blade, a 1 cm  incision made just to the left of the umbilicus. Veress needle approach  was used to access the peritoneal cavity, which was then insufflated. Visiport was then placed at that site, and the abdomen was then  reinsufflated. Four additional trocars were placed in the usual U-  shaped figuration. Subxiphoid incision was then made to  accommodate the [facility] retractor. On entering the abdomen with  the scope, the patient was noted to have classic adhesions consistent  with prior lap band placement. I had to clear the entire left subhepatic  space and lysing adhesions using hook Bovie cautery and dissection  noy.  Once I was able to clear the subhepatic space all the way to  the level of the diaphragm, I did note the stomach appeared to be  abnormal proximally and I could not really discern the plication wrap  initially. I began along the medial aspect of the upper stomach where  there was a small area of calcification present. I was able to create a  pathway there by almost incising this calcification, I was able to then  visualize the very medial aspect of this plication which had essentially  fused closed. This was creating the effect we had seen on endoscopy. Therefore, in a very careful fashion, I dissected along this plication  region until I reached 2 separate sutures which I released. I was able  to then follow this all the way to the level of the hemidiaphragm on the  left and this essentially released the stomach back to normal. I then  placed a Baker's tube transorally in the stomach, inflating the balloon  to 20 mL saline solution. I then pulled it back towards the  gastroesophageal junction and the gastric anatomy now appeared to  totally normal. I was satisfied that I would be able to perform the  operation. Therefore, I turned my attention to the small bowel portion of  the operation. I then located the ligament of Treitz. I measured an area  approximately 40-50 cm distal to the ligament of Treitz. I transected the  small bowel using the Endo RICHI stapler with white reloads, then  undercut the mesentery of the small bowel using 2 firings of the Endo  RICHI stapler, which allowed for excellent mobilization to the upper  abdominal region. I then measured off a 1 or 2 cm Fritz limb bypass. I  placed it in a side-to-side fashion with the afferent limb, placing stay  sutures in 2 limbs of the bowel. I then created enterotomies in 2 limbs  of the bowel. I placed the RICHI stapler intraluminally. I then closed it and  fired, creating the linear anastomosis. With this anastomosis being  hemostatic, free margin of the enterotomy was then reapproximated  using #2 Ethibond suture. These sutures were then held up to facilitate  closure using the stapling device.  The mesenteric defect at that site  was then closed using running 2-0 Ethibond suture. At this juncture,  the Fritz limb was brought over the colon. It reached all the way to the  level of the left hemidiaphragm under no tension at all. I then dissected using the  Harmonic scalpel along the lesser curvature of the stomach,  proximally, finally entering the retrogastric space  off the  lesser curvature vessels in the process. I then marked the planned  anastomosis of the pouch using a single dot of dilute methylene blue  and I removed the Baker's tube at this juncture. An anterior gastrotomy  was then fashioned in the antrum of the stomach, then the cap of a 21  ILS stapler was then placed transabdominally. It was guided through  the gastrotomy out through the anterior gastric pouch in the area  marked using a flamingo grasper and Prolene suture attached to the  cap. After retrieving the cap, a pursestring suture was then placed at  the base and tied securely. I then created the pouch using the [place]  60 stapler by using blue reloads. The first firing was along the base of  the planned pouch, then I used 3 firings to complete the pouch at the  left crura. With the pouch having been created, the anterior gastrotomy  was then closed using the same stapling device. I then, at this  juncture, brought the Fritz limb in an antecolic antegastric fashion. It  reached effortlessly to the upper abdomen under no tension at all. The  free margin was then opened up using the Harmonic scalpel. Then, I  guided the circular stapling device through the enterotomy of the Fritz  limb, pulling the spear through the anterior antimesenteric border of the  bowel. It was then attached to the cap, closed and fired, creating the  circular anastomosis. With 2 very good tissue rings noted within the  stapling device, free margin of the Fritz limb was then trimmed free  using the Endo GI stapler with white reloads.  I then oversewed the  anastomosis using 2-0 Ethibond suture, then tested the pouch using  dilute methylene blue. It was noted to be completely watertight. It  should be noted that during the upper abdominal portion of the  operation, I did not that she did have a hiatal hernia. I did go ahead  and close it using 2-0 Ethibond suture against the esophageal wall. I  then performed endoscopy on the patient, placed the scope  transorally. Oropharynx was normal. Proximal, mid and distal  esophagus was normal. There was the aforementioned hiatal hernia  which I had noted and I entered the pouch with no problem at all. The  pouch was not bleeding. The anastomosis was widely patent and at  this juncture, I did biopsy the pouch wall and submitted it for H pylori  assessment. All areas were hemostatic, the scope was then removed. I then turned my attention back to the abdominal portion of the  operation, checking all areas for hemostasis. I then placed Evicel along  the entire upper abdominal region along all staple lines. I then placed  Surgicel snow along all staple lines likewise. With this having been  completed, I then placed a NATTY drain in the upper abdomen, I removed  the liver retractor and found the liver to drop back to normal position. There was some nodularity to the liver and some fatty infiltration noted. Therefore, I did biopsy the liver in a wedge fashion. All areas were hemostatic. I  then removed all trocars, closing the left lower quadrant trocar site  using a transabdominal 0 PDS suture along the fascia. Steri strips were  applied. The patient tolerated the procedure well.         Demi Killian MD    AT / CD  D:  04/18/2017   18:31  T:  04/19/2017   05:12  Job #:  [id number]

## 2017-04-19 NOTE — PROGRESS NOTES
0- Received pt from Braden Doe RN. Pt in room, resting quietly with visitors present. No needs voiced at this time. Call bell and phone within reach. Room clutter free. Nurse will continue to monitor. 0740- Pt sent down to radiology at this time    0932- Pt returned from radiology    6058- Physical assessment and morning meds passed at this time. Pt educated about diet and taking small sips of fluids to maintain hydration. Pt verbalizes understanding.

## 2017-04-19 NOTE — PROGRESS NOTES
7906 Desai catheter removed. Tolerated well. SHIFT SUMMARY: No events. Pt able to walk in halls and do several laps easily with encouragement from . Minimal nausea. Pain managed with dilaudid. Very cooperative with care. Bowel sounds more active.

## 2017-04-19 NOTE — PROGRESS NOTES
Discharge Reassessment Plan:  Low Risk    RRAT Score:  1 - 12     Low Risk Care Transition Interventions:  1. Discharge transition plan:    2. Involved patient/caregiver in assessment, planning, education and implement of intervention. 3. CM daily patient care huddles/interdisciplinary rounds were completed. 4. PCP/Specialist appointment within 5 days made prior to discharge. Date/Time  5. Facilitated transportation and logistics for follow-up appointments. Chart reviewed and noted Pt admitted for conversion to lap bypass. No immediate dc needs identified at this time. CM remains available for assistance. Care Management Interventions  PCP Verified by CM: Yes  Mode of Transport at Discharge: Other (see comment)  Transition of Care Consult (CM Consult): Discharge Planning  Health Maintenance Reviewed: Yes  Current Support Network: Family Lives Nearby  Confirm Follow Up Transport: Family  Plan discussed with Pt/Family/Caregiver:  Yes

## 2017-04-19 NOTE — PROGRESS NOTES
Bariatric Surgery                POD #1    Visit Vitals    /57 (BP 1 Location: Left arm, BP Patient Position: At rest)    Pulse 73    Temp 98.8 °F (37.1 °C)    Resp 18    Ht 5' 2\" (1.575 m)    Wt 103.1 kg (227 lb 6 oz)    LMP 03/21/2017    SpO2 97%    Breastfeeding No    BMI 41.59 kg/m2     Patient has minimal complaints of pain, minimal nausea noted     Exam:  Appears well in no distress  Lungs- clear bilaterally  Abd - soft, incisions look good without erythema           NATTY with minimal serosanguinous output  Extremities- no new edema or swelling    UGI - no obstrustion or leak    Data Review:    Labs: Results:       Chemistry No results for input(s): GLU, NA, K, CL, CO2, BUN, CREA, CA, AGAP, BUCR, TBIL, GPT, AP, TP, ALB, GLOB, AGRAT in the last 72 hours. CBC w/Diff No results for input(s): WBC, RBC, HGB, HCT, PLT, GRANS, LYMPH, EOS, RETIC, HGBEXT, HCTEXT, PLTEXT in the last 72 hours. Coagulation No results for input(s): PTP, INR, APTT in the last 72 hours. No lab exists for component: INREXT    Liver Enzymes No results for input(s): TP, ALB, TBIL, AP, SGOT, GPT in the last 72 hours. No lab exists for component: DBIL       Assessment/Plan: S/P  laparoscopic gastric bypass surgery - doing well without any issues    1.  D/C home

## 2017-04-19 NOTE — ROUTINE PROCESS
Bedside and Verbal shift change report given to CARLOS Guadarrama RN (oncoming nurse) by Benny Hayes RN (offgoing nurse). Report included the following information SBAR, Kardex, Intake/Output, MAR and Recent Results.

## 2017-04-19 NOTE — NURSE NAVIGATOR
Doing well. UGI results confirmed. Tolerating liquid diet and protein shakes. Denies nausea. Pain - 3.  NATTY output WNL. Adequate urine output. Encouraged to continue to cough, deep breathe and use spirometer every hour while awake. Encouraged to be out of bed ambulating no less than 3 times today. Dressings removed. Incisions dry and intact. Discussed diet and activities after discharge.

## 2017-04-19 NOTE — DISCHARGE SUMMARY
Discharge Summary    Patient: Ava Osorio               Sex: female          DOA: 4/18/2017         YOB: 1980      Age:  43 y.o.        LOS:  LOS: 1 day                Admit Date: 4/18/2017    Discharge Date: 4/19/2017    Admission Diagnoses: [address] GASTRIC BAND*    Discharge Diagnoses:    Problem List as of 4/19/2017  Date Reviewed: 4/18/2017          Codes Class Noted - Resolved    Hourglass stricture of stomach ICD-10-CM: K31.2  ICD-9-CM: 536.8  Unknown - Present        Morbid obesity with BMI of 40.0-44.9, adult ([facility] [address] ICD-10-CM: E66.01, Z68.41  ICD-9-CM: 278.01, V85.41  Unknown - Present        [facility] ICD-10-CM: E66.01  ICD-9-CM: 278.01  Unknown - Present        History of adjustable gastric banding ICD-10-CM: Z98.84  ICD-9-CM: V45.86  Unknown - Present    Overview Signed 2/20/2017  8:19 AM by JOSSE Nguyen     placed 2011 / removed 2014             Migraine ICD-10-CM: X81.199  ICD-9-CM: 346.90  Unknown - Present        Chronic back pain ICD-10-CM: M54.9, G89.29  ICD-9-CM: 724.5, 338.29  Unknown - Present        AVELINO (stress urinary incontinence, female) ICD-10-CM: N39.3  ICD-9-CM: 625.6  Unknown - Present        PCOS (polycystic ovarian syndrome) ICD-10-CM: E28.2  ICD-9-CM: 256.4  Unknown - Present        Irregular menses ICD-10-CM: N92.6  ICD-9-CM: 626.4  Unknown - Present    Overview Signed 2/20/2017  8:21 AM by JOSSE Nguyen     due to PCOS             GERD (gastroesophageal reflux disease) ICD-10-CM: K21.9  ICD-9-CM: 530.81  Unknown - Present        Arthritic-like pain ICD-10-CM: M25.50  ICD-9-CM: 719.40  Unknown - Present              Discharge Condition: Good    Hospital Course: The patient underwent  laparoscopic gastric bypass surgery  on 4/18/2017. The patient tolerated the procedure well. Vital signs remained stable and the patient was transferred to  3rd floor surgical unit without complications.  The patient remained stable throughout the first night post operatively with stable vital signs and adequate urine output and pain control. Pain was controlled with Dilaudid IV and IV Tylenol . The patient on the first morning post operative was transferred to the radiology suite where they underwent a gastrograffin UGI study which showed no evidence of a leak or stricture. The drain was discontinued on POD # 1 and the patient was started on a bariatric liquid diet with protein shakes. The patient progressed throughout the day and was ambulating well and tolerating their diet. They were therefore discharged home with instructions to notify me with any issues that may arise. Significant Diagnostic Studies:   No results for input(s): HGB, HGBEXT in the last 72 hours. No results for input(s): HCT, HCTEXT in the last 72 hours. Current Discharge Medication List          Activity: activity as tolerated with no heavy lifting of greater than 20 pounds. No anti- inflammatory medications. Use stool softeners at home as needed while taking pain medications since they are constipating. Diet: Bariatric liquid diet    Wound Care: Keep wound clean and dry, Reinforce dressing PRN and ice to area for comfort. Do not get wound wet for 2 days.     Follow-up: 14 days with Dr Edinson Hurtado M.D

## 2017-04-19 NOTE — DISCHARGE INSTRUCTIONS
[facility]  Phil Martinez. Denzel Presley M.D., F.A.C.S.  [address]. [address] [place] [address] Rd, [address] [facility]  Office: [phone]    Fax:  [phone]    Discharge Instruction for Gastric Bypass / Sleeve Gastrectomy Patients    Diet:   Continue with the liquid diet until you are seen in the office. Make sure you sip fluids all day. Goal for total fluid intake is at least 64 ounces per day. Aim for  Gms of protein every day. Activity:   Walking is encouraged. Rest when you are tired.  You may shower.  You may climb stairs. Take your time.  No lifting more than 10-15 lbs.  If you feel discomfort during an activity, rest.   Do not drive for 1 week or until off of all narcotics. Wound Care:   Clean incisions with soap and water when in the shower. Pat dry.  Leave steri-strips on until they fall off.  A small amount of drainage may be present from the incisions. Contact the office if you notice an increase in drainage, an odor, increased redness, or fever > 100.5. Medications:   You will receive a prescription for pain medication and Prilosec at the time of discharge.  For upset stomach you may take over the counter medications such as Maalox, Mylanta, Pepcid, Zantac, or Tagamet.  Gas X works very well for bloating when eating or drinking   You may take Tylenol   Non-aspirin based arthritis medications may be resumed    Take a childrens or adult chewable multivitamin.  Milk of Magnesia will help with constipation.  It is fine to take your usual home medications. Blood pressure medications should be continued after surgery. Diabetic medications can frequently be reduced very quickly after surgery. Diabetics should continue to monitor blood sugars frequently after surgery and contact the prescribing physician for any questions.   Follow-Up Appointment:   If you do not already have a follow-up appointment scheduled, contact the office in the next few days to obtain one. It is usually scheduled for 10-14 days after you surgery date. Office phone number:  [phone].         REV  01/2017

## 2017-04-19 NOTE — PROGRESS NOTES
Bariatric Surgery                POD #1    Visit Vitals    /58 (BP 1 Location: Left arm, BP Patient Position: At rest)    Pulse 87    Temp 98.4 °F (36.9 °C)    Resp 18    Ht 5' 2\" (1.575 m)    Wt 103.1 kg (227 lb 6 oz)    LMP 03/21/2017    SpO2 97%    Breastfeeding No    BMI 41.59 kg/m2     Patient has minimal complaints of pain, minimal nausea noted     Exam:  Appears well in no distress  Lungs- clear bilaterally  Abd - soft, incisions look good without erythema           NATTY with minimal serosanguinous output  Extremities- no new edema or swelling    UGI - no obstrustion or leak    Data Review:    Labs: Results:       Chemistry No results for input(s): GLU, NA, K, CL, CO2, BUN, CREA, CA, AGAP, BUCR, TBIL, GPT, AP, TP, ALB, GLOB, AGRAT in the last 72 hours. CBC w/Diff No results for input(s): WBC, RBC, HGB, HCT, PLT, GRANS, LYMPH, EOS, RETIC, HGBEXT, HCTEXT, PLTEXT in the last 72 hours. Coagulation No results for input(s): PTP, INR, APTT in the last 72 hours. No lab exists for component: INREXT    Liver Enzymes No results for input(s): TP, ALB, TBIL, AP, SGOT, GPT in the last 72 hours. No lab exists for component: DBIL       Assessment/Plan: S/P  laparoscopic gastric bypass surgery - doing well without any issues    1. Start bariatric diet and protein shakes  2. D/C IV pain meds and start PO pain meds  3. D/C NATTY drain  4. Likley PM D/C if  Cont ok and tolerate PO

## 2017-04-25 ENCOUNTER — NURSE NAVIGATOR (OUTPATIENT)
Dept: SURGERY | Age: 37
End: 2017-04-25

## 2017-04-25 NOTE — PROGRESS NOTES
Attempted to reach Андрей Vides to discuss post-op recovery. No answer. Left message to call with any questions or concerns. Reminded of office appointment next week.

## 2017-05-03 ENCOUNTER — OFFICE VISIT (OUTPATIENT)
Dept: SURGERY | Age: 37
End: 2017-05-03

## 2017-05-03 VITALS
HEART RATE: 75 BPM | DIASTOLIC BLOOD PRESSURE: 72 MMHG | OXYGEN SATURATION: 100 % | BODY MASS INDEX: 38.64 KG/M2 | WEIGHT: 210 LBS | SYSTOLIC BLOOD PRESSURE: 116 MMHG | HEIGHT: 62 IN

## 2017-05-03 DIAGNOSIS — Z98.84 S/P BARIATRIC SURGERY: ICD-10-CM

## 2017-05-03 DIAGNOSIS — E88.89 KETOSIS (HCC): ICD-10-CM

## 2017-05-03 DIAGNOSIS — K90.9 INTESTINAL MALABSORPTION, UNSPECIFIED TYPE: Primary | ICD-10-CM

## 2017-05-03 NOTE — PROGRESS NOTES
Subjective:     Missy Gomez  is a 43 y.o. female who presents for follow-up about 2 weeks following laparoscopic gastric bypass surgery with diaphragmatic hernia repair & FERNANDO>30 minutes. She is a former lap band pt (placed in 2011) who had her band removed in 2013 in [place]. She has lost a total of 20 pounds since surgery. Body mass index is 38.41 kg/(m^2). .    Surgery related complication: NA.   Liver bx report reviewed with pt. Stomach bx report reviewed with pt. She is tolerating liquids without difficulty,reports ketotic symptoms and denies vomiting and abdominal pain. Fluid intake:good  Protein intake:good  Taking prescribed multivitamin. The patient's activity level: moderately active. Back to school as  tomorrow. Changes in her medical history and medications have been reviewed.     Patient Active Problem List   Diagnosis Code    Morbid obesity with BMI of 40.0-44.9, adult ([facility] [address] E66.01, Z68.41    Morbid obesity ([facility] [address] E66.01    History of adjustable gastric banding Z98.84    Migraine G43.909    Chronic back pain M54.9, G89.29    AVELINO (stress urinary incontinence, female) N39.3    PCOS (polycystic ovarian syndrome) E28.2    Irregular menses N92.6    GERD (gastroesophageal reflux disease) K21.9    Arthritic-like pain M25.50    Hourglass stricture of stomach K31.2    Intestinal malabsorption K90.9    S/P bariatric surgery Z98.84     Past Medical History:   Diagnosis Date    Arthritic-like pain     Arthritis     osteo    Chronic back pain     GERD (gastroesophageal reflux disease)     History of adjustable gastric banding     placed 2011 / removed 2013 all in [address] Hourglass stricture of stomach     Irregular menses     due to PCOS    Migraine     Morbid obesity ([facility] [address]     Morbid obesity with BMI of 40.0-44.9, adult ([facility]     Nausea & vomiting     severe    PCOS (polycystic ovarian syndrome)     AVELINO (stress urinary incontinence, female) Past Surgical History:   Procedure Laterality Date    HX GI  2011    [place] gastric band [place] HX GI  2013    removal of gastric band in [place] (not original surgeon)    HX HEENT      mulitple tympanic membrane surgeries as a child    HX TONSIL AND ADENOIDECTOMY      HX TUBAL LIGATION       Current Outpatient Prescriptions   Medication Sig Dispense Refill    L. RHAMNOSUS GG/INULIN (CULTURELLE PROBIOTICS PO) Take  by mouth.  multivitamin with iron (FLINTSTONES) chewable tablet Take 1 Tab by mouth two (2) times a day. Objective:     Visit Vitals    /72 (BP 1 Location: Left arm, BP Patient Position: Sitting)    Pulse 75    Ht 5' 2\" (1.575 m)    Wt 95.3 kg (210 lb)    LMP 05/01/2017 (Exact Date)    SpO2 100%    BMI 38.41 kg/m2        Physical Exam:    General:  alert, cooperative, no distress, appears stated age   Heart:  Regular rate and rhythm. .                     Lungs:   Lungs CTA   Abdomen:   abdomen is soft without significant tenderness, masses, organomegaly or guarding; Active bowel sounds all 4 quadrants. No hernia present. Incisions: healing well       Assessment:     1. History of Morbid obesity, status post  laparoscopic gastric bypass surgery. Doing well. 2.  Ketosis    Plan:     1. Discussed her ketosis. To continue focus on hydration. 2. May advance diet to soft phase. Reminded to measure portions, continue high protein, low carbohydrate diet. Reminded to eat regularly, to eat slowly & not to drink with meals. Discussed with pt. Pt verbalized understanding. 3.   Reminded about importance of taking vitamin supplements. 4.  To start cardio exercise. 5.  To continue current rx. To continue follow-up with PCP. 6.  Encouraged attendance @ support group  7. I have discussed this plan and education material with patient. Pt verbalized understanding. 8.   To follow-up in 2 week(s).   To call if questions/concerns prior to office visit. Ms. Cueto All has a reminder for a \"due or due soon\" health maintenance. I have asked that she contact her primary care provider for follow-up on this health maintenance.

## 2017-05-03 NOTE — MR AVS SNAPSHOT
Visit Information Date & Time Provider Department Dept. Phone Encounter #  
 5/3/2017  1:30 PM Alyson Sia, NP [facility] [phone] [id number] Your Appointments 5/18/2017  1:00 PM  
Office Visit with Alyson Sia, NP [facility] Appt Note: r/s  
 [address] 5/18/2017  1:30 PM  
NUTRITION COUNSELING with TSS NUTRI VISIT PEN [facility] ([facility]) Appt Note: 1 mo po; had to r/s and LM for pts date change on 5/17/2017; r/s  
 [address] Upcoming Health Maintenance Date Due DTaP/Tdap/Td series (1 - Tdap) 11/8/1999 PAP AKA CERVICAL CYTOLOGY 11/8/1999 INFLUENZA AGE 9 TO ADULT 8/1/2017 Allergies as of 5/3/2017  Review Complete On: 5/3/2017 By: Kevin Rush LPN No Known Allergies Current Immunizations  Reviewed on 4/19/2017 No immunizations on file. Not reviewed this visit Vitals BP Pulse Height(growth percentile) Weight(growth percentile) LMP SpO2  
 116/72 (BP 1 Location: Left arm, BP Patient Position: Sitting) 75 5' 2\" (1.575 m) 210 lb (95.3 kg) 05/01/2017 (Exact Date) 100% BMI OB Status Smoking Status 38.41 kg/m2 Having regular periods Never Smoker BMI and BSA Data Body Mass Index Body Surface Area  
 38.41 kg/m 2 2.04 m 2 Preferred Pharmacy Pharmacy Name Phone [address], [place] [phone] Your Updated Medication List  
  
   
This list is accurate as of: 5/3/17  2:36 PM.  Always use your most recent med list.  
  
  
  
  
 CULTURELLE PROBIOTICS PO  
 Take  by mouth.  
  
 multivitamin with iron chewable tablet Commonly known as:  Yoli Ham Take 1 Tab by mouth two (2) times a day. Patient Instructions Continue focus on hydration. May advance to soft diet. Please review material in your binder. Remember - your motto is \"soft foods with protein\". Eat regularly. Continue to use protein shakes. Remember to eat slowly & not to drink fluids with your meals. Increase activity as able - cardio (walking) only. Continue to take multi-vitamins. Continue regular follow-up with your PCP. Return to office in 2 weeks for your next appointment. Call the office if you have any questions or concerns. Walking for Exercise: Care Instructions Your Care Instructions Walking is one of the easiest ways to get the exercise you need for good health. A brisk, 30-minute walk each day can help you feel better and have more energy. It can help you lower your risk of disease. Walking can help you keep your bones strong and your heart healthy. Check with your doctor before you start a walking plan if you have heart problems, other health issues, or you have not been active in a long time. Follow your doctor's instructions for safe levels of exercise. Follow-up care is a key part of your treatment and safety. Be sure to make and go to all appointments, and call your doctor if you are having problems. It's also a good idea to know your test results and keep a list of the medicines you take. How can you care for yourself at home? Getting started · Start slowly and set a short-term goal. For example, walk for 5 or 10 minutes every day. · Bit by bit, increase the amount you walk every day. Try for at least 30 minutes on most days of the week. You also may want to swim, bike, or do other activities. · If finding enough time is a problem, it is fine to be active in blocks of 10 minutes or more throughout your day and week. · To get the heart-healthy benefits of walking, you need to walk briskly enough to increase your heart rate and breathing, but not so fast that you cannot talk comfortably. · Wear comfortable shoes that fit well and provide good support for your feet and ankles. Staying with your plan · After you've made walking a habit, set a longer-term goal. You may want to set a goal of walking briskly for longer or walking farther. Experts say to do 2½ hours of moderate activity a week. A faster heartbeat is what defines moderate-level activity. · To stay motivated, walk with friends, coworkers, or pets. · Use a phone sandra or pedometer to track your steps each day. Set a goal to increase your steps. Once you get there, set a higher goal. Adults should work toward 10,000 steps a day. Children who are 10to 15years old need more stepsat least 12,000 for girls and at least 15,000 for boys. · If the weather keeps you from walking outside, go for walks at the mall with a friend. Local schools and churches may have indoor gyms where you can walk. Fitting a walk into your workday · Park several blocks away from work, or get off the bus a few stops early. · Use the stairs instead of the elevator, at least for a few floors. · Suggest holding meetings with colleagues during a walk inside or outside the building. · Use the restroom that is the farthest from your desk or workstation. · Use your morning and afternoon breaks to take quick 15-minute walks. Staying safe · Know your surroundings. Walk in a well-lighted, safe place. If it is dark, walk with a partner. Wear light-colored clothing. If you can, buy a vest or jacket that reflects light. · Carry a cell phone for emergencies. · Drink plenty of water. Take a water bottle with you when you walk. This is very important if it is hot out. · Be careful not to slip on wet or icy ground. You can buy \"grippers\" for your shoes to help keep you from slipping. · Pay attention to your walking surface. Use sidewalks and paths. · If you have breathing problems like asthma or COPD, ask your doctor when it is safe for you to walk outdoors. Cold, dry air, smog, pollen, or other things in the air could cause breathing problems. Where can you learn more? Go to http://bentley-gina.info/. Enter R159 in the search box to learn more about \"Walking for Exercise: Care Instructions. \" Current as of: May 27, 2016 Content Version: 11.2 © 5324-6419 [employer]. Care instructions adapted under license by [employer] (which disclaims liability or warranty for this information). If you have questions about a medical condition or this instruction, always ask your healthcare professional. [address] any warranty or liability for your use of this information. Introducing [facility]! Bear Ramos introduces [employer] patient portal. Now you can access parts of your medical record, email your doctor's office, and request medication refills online. 1. In your internet browser, go to [email or web address] 2. Click on the First Time User? Click Here link in the Sign In box. You will see the New Member Sign Up page. 3. Enter your [employer] Access Code exactly as it appears below. You will not need to use this code after youve completed the sign-up process. If you do not sign up before the expiration date, you must request a new code. · [employer] Access Code: [email or web address] Expires: 5/21/2017  9:44 AM 
 
4. Enter the last four digits of your Social Security Number (xxxx) and Date of Birth (mm/dd/yyyy) as indicated and click Submit. You will be taken to the next sign-up page. 5. Create a [employer] ID. This will be your [employer] Software login ID and cannot be changed, so think of one that is secure and easy to remember. 6. Create a [employer] password. You can change your password at any time. 7. Enter your Password Reset Question and Answer. This can be used at a later time if you forget your password. 8. Enter your e-mail address. You will receive e-mail notification when new information is available in [address] 9. Click Sign Up. You can now view and download portions of your medical record. 10. Click the Download Summary menu link to download a portable copy of your medical information. If you have questions, please visit the Frequently Asked Questions section of the [employer] website. Remember, [employer] is NOT to be used for urgent needs. For medical emergencies, dial 911. Now available from your iPhone and Android! Please provide this summary of care documentation to your next provider. Your primary care clinician is listed as Gearline Jason. If you have any questions after today's visit, please call [phone].

## 2017-05-03 NOTE — PATIENT INSTRUCTIONS
Continue focus on hydration. May advance to soft diet. Please review material in your binder. Remember - your motto is \"soft foods with protein\". Eat regularly. Continue to use protein shakes. Remember to eat slowly & not to drink fluids with your meals. Increase activity as able - cardio (walking) only. Continue to take multi-vitamins. Continue regular follow-up with your PCP. Return to office in 2 weeks for your next appointment. Call the office if you have any questions or concerns. Walking for Exercise: Care Instructions  Your Care Instructions  Walking is one of the easiest ways to get the exercise you need for good health. A brisk, 30-minute walk each day can help you feel better and have more energy. It can help you lower your risk of disease. Walking can help you keep your bones strong and your heart healthy. Check with your doctor before you start a walking plan if you have heart problems, other health issues, or you have not been active in a long time. Follow your doctor's instructions for safe levels of exercise. Follow-up care is a key part of your treatment and safety. Be sure to make and go to all appointments, and call your doctor if you are having problems. It's also a good idea to know your test results and keep a list of the medicines you take. How can you care for yourself at home? Getting started  · Start slowly and set a short-term goal. For example, walk for 5 or 10 minutes every day. · Bit by bit, increase the amount you walk every day. Try for at least 30 minutes on most days of the week. You also may want to swim, bike, or do other activities. · If finding enough time is a problem, it is fine to be active in blocks of 10 minutes or more throughout your day and week. · To get the heart-healthy benefits of walking, you need to walk briskly enough to increase your heart rate and breathing, but not so fast that you cannot talk comfortably.   · Wear comfortable shoes that fit well and provide good support for your feet and ankles. Staying with your plan  · After you've made walking a habit, set a longer-term goal. You may want to set a goal of walking briskly for longer or walking farther. Experts say to do 2½ hours of moderate activity a week. A faster heartbeat is what defines moderate-level activity. · To stay motivated, walk with friends, coworkers, or pets. · Use a phone sandra or pedometer to track your steps each day. Set a goal to increase your steps. Once you get there, set a higher goal. Adults should work toward 10,000 steps a day. Children who are 10to 15years old need more steps--at least 12,000 for girls and at least 15,000 for boys. · If the weather keeps you from walking outside, go for walks at the mall with a friend. Local schools and churches may have indoor gyms where you can walk. Fitting a walk into your workday  · Park several blocks away from work, or get off the bus a few stops early. · Use the stairs instead of the elevator, at least for a few floors. · Suggest holding meetings with colleagues during a walk inside or outside the building. · Use the restroom that is the farthest from your desk or workstation. · Use your morning and afternoon breaks to take quick 15-minute walks. Staying safe  · Know your surroundings. Walk in a well-lighted, safe place. If it is dark, walk with a partner. Wear light-colored clothing. If you can, buy a vest or jacket that reflects light. · Carry a cell phone for emergencies. · Drink plenty of water. Take a water bottle with you when you walk. This is very important if it is hot out. · Be careful not to slip on wet or icy ground. You can buy \"grippers\" for your shoes to help keep you from slipping. · Pay attention to your walking surface. Use sidewalks and paths. · If you have breathing problems like asthma or COPD, ask your doctor when it is safe for you to walk outdoors.  Cold, dry air, smog, pollen, or other things in the air could cause breathing problems. Where can you learn more? Go to http://bentley-gina.info/. Enter R159 in the search box to learn more about \"Walking for Exercise: Care Instructions. \"  Current as of: May 27, 2016  Content Version: 11.2  © 4181-4035 [employer]. Care instructions adapted under license by [employer] (which disclaims liability or warranty for this information). If you have questions about a medical condition or this instruction, always ask your healthcare professional. [address] any warranty or liability for your use of this information.

## 2017-05-18 ENCOUNTER — OFFICE VISIT (OUTPATIENT)
Dept: SURGERY | Age: 37
End: 2017-05-18

## 2017-05-18 ENCOUNTER — CLINICAL SUPPORT (OUTPATIENT)
Dept: SURGERY | Age: 37
End: 2017-05-18

## 2017-05-18 VITALS
BODY MASS INDEX: 36.88 KG/M2 | WEIGHT: 200.4 LBS | SYSTOLIC BLOOD PRESSURE: 109 MMHG | HEIGHT: 62 IN | DIASTOLIC BLOOD PRESSURE: 65 MMHG | OXYGEN SATURATION: 100 % | HEART RATE: 74 BPM

## 2017-05-18 DIAGNOSIS — K90.9 INTESTINAL MALABSORPTION, UNSPECIFIED TYPE: Primary | ICD-10-CM

## 2017-05-18 DIAGNOSIS — K21.9 GASTROESOPHAGEAL REFLUX DISEASE WITHOUT ESOPHAGITIS: ICD-10-CM

## 2017-05-18 DIAGNOSIS — E66.01 MORBID OBESITY WITH BMI OF 40.0-44.9, ADULT (HCC): Primary | ICD-10-CM

## 2017-05-18 DIAGNOSIS — Z98.84 S/P BARIATRIC SURGERY: ICD-10-CM

## 2017-05-18 RX ORDER — OMEPRAZOLE 20 MG/1
20 CAPSULE, DELAYED RELEASE ORAL DAILY
COMMUNITY
End: 2018-01-26

## 2017-05-18 RX ORDER — URSODIOL 500 MG/1
500 TABLET, FILM COATED ORAL DAILY
Qty: 30 TAB | Refills: 4 | Status: SHIPPED | OUTPATIENT
Start: 2017-05-18 | End: 2017-10-12 | Stop reason: ALTCHOICE

## 2017-05-18 RX ORDER — LORATADINE 10 MG/1
10 TABLET ORAL
COMMUNITY
End: 2022-05-04 | Stop reason: ALTCHOICE

## 2017-05-18 NOTE — MR AVS SNAPSHOT
Visit Information Date & Time Provider Department Dept. Phone Encounter #  
 5/18/2017  1:00 PM Jocelyn Mazariegos NP [facility] [place] [id number] Follow-up Instructions Return in about 4 weeks (around 6/15/2017). Your Appointments 6/28/2017 11:00 AM  
POST OP with Maxx Bhakta MD  
[facility] [place] ([address]) Appt Note: 2 mo po  
 [address] [address] [place] [address] Upcoming Health Maintenance Date Due DTaP/Tdap/Td series (1 - Tdap) 11/8/1999 PAP AKA CERVICAL CYTOLOGY 11/8/1999 INFLUENZA AGE 9 TO ADULT 8/1/2017 Allergies as of 5/18/2017  Review Complete On: 5/18/2017 By: Jocelyn Mazariegos NP No Known Allergies Current Immunizations  Reviewed on 4/19/2017 No immunizations on file. Not reviewed this visit You Were Diagnosed With   
  
 Codes Comments Intestinal malabsorption, unspecified type    -  Primary ICD-10-CM: K90.9 ICD-9-CM: 579.9 S/P bariatric surgery     ICD-10-CM: Z98.84 ICD-9-CM: V45.86 Gastroesophageal reflux disease without esophagitis     ICD-10-CM: K21.9 ICD-9-CM: 530.81 Vitals BP Pulse Height(growth percentile) Weight(growth percentile) LMP SpO2  
 109/65 (BP 1 Location: Left arm, BP Patient Position: Sitting) 74 5' 2\" (1.575 m) 200 lb 6.4 oz (90.9 kg) 05/01/2017 (Exact Date) 100% BMI OB Status Smoking Status 36.65 kg/m2 Having regular periods Never Smoker BMI and BSA Data Body Mass Index Body Surface Area  
 36.65 kg/m 2 1.99 m 2 Preferred Pharmacy Pharmacy Name Phone [address], [place] [phone] Your Updated Medication List  
  
   
This list is accurate as of: 5/18/17  2:03 PM.  Always use your most recent med list.  
  
  
  
  
 CLARITIN 10 mg tablet Generic drug:  loratadine Take 10 mg by mouth. CULTURELLE PROBIOTICS PO Take  by mouth.  
  
 multivitamin with iron chewable tablet Commonly known as:  Favian Beat Take 1 Tab by mouth two (2) times a day. PriLOSEC 20 mg capsule Generic drug:  omeprazole Take 20 mg by mouth daily. sucralfate 100 mg/mL suspension Commonly known as:  Benjie Nichols Take 10 mL by mouth four (4) times daily. ursodiol 500 mg tablet Commonly known as:  TUNDE FORTE Take 1 Tab by mouth daily. Take with food. Prescriptions Sent to Pharmacy Refills  
 ursodiol (TUNDE FORTE) 500 mg tablet 4 Sig: Take 1 Tab by mouth daily. Take with food. Class: Normal  
 Pharmacy: [address], [place]  #: [phone] Route: Oral  
  
Follow-up Instructions Return in about 4 weeks (around 6/15/2017). Patient Instructions To start tunde forte for gall bladder protection. May advance diet to solid phase. Remember to measure portions, to keep the focus on increasing your protein & keeping your carbs low. Continue the use of protein shakes. To follow recommendations of dietician. Stay hydrated! Eat regularly, eat slowly & do not drink with your meals. Vitamins to be taken include your multivitamin, B12, calcium citrate, Vit D & Bcomplex. Refer to your notebook & handouts for dosages. Continue to increase cardio activity. May add resistance exercises in 2 weeks. Continue follow-up with your PCP. Return to this office in 1 month. Call if questions/concerns prior to your office visit. Walking for Exercise: Care Instructions Your Care Instructions Walking is one of the easiest ways to get the exercise you need for good health. A brisk, 30-minute walk each day can help you feel better and have more energy. It can help you lower your risk of disease.  Walking can help you keep your bones strong and your heart healthy. Check with your doctor before you start a walking plan if you have heart problems, other health issues, or you have not been active in a long time. Follow your doctor's instructions for safe levels of exercise. Follow-up care is a key part of your treatment and safety. Be sure to make and go to all appointments, and call your doctor if you are having problems. It's also a good idea to know your test results and keep a list of the medicines you take. How can you care for yourself at home? Getting started · Start slowly and set a short-term goal. For example, walk for 5 or 10 minutes every day. · Bit by bit, increase the amount you walk every day. Try for at least 30 minutes on most days of the week. You also may want to swim, bike, or do other activities. · If finding enough time is a problem, it is fine to be active in blocks of 10 minutes or more throughout your day and week. · To get the heart-healthy benefits of walking, you need to walk briskly enough to increase your heart rate and breathing, but not so fast that you cannot talk comfortably. · Wear comfortable shoes that fit well and provide good support for your feet and ankles. Staying with your plan · After you've made walking a habit, set a longer-term goal. You may want to set a goal of walking briskly for longer or walking farther. Experts say to do 2½ hours of moderate activity a week. A faster heartbeat is what defines moderate-level activity. · To stay motivated, walk with friends, coworkers, or pets. · Use a phone sandra or pedometer to track your steps each day. Set a goal to increase your steps. Once you get there, set a higher goal. Adults should work toward 10,000 steps a day. Children who are 10to 15years old need more stepsat least 12,000 for girls and at least 15,000 for boys.  
· If the weather keeps you from walking outside, go for walks at the mall with a friend. Local schools and churches may have indoor gyms where you can walk. Fitting a walk into your workday · Park several blocks away from work, or get off the bus a few stops early. · Use the stairs instead of the elevator, at least for a few floors. · Suggest holding meetings with colleagues during a walk inside or outside the building. · Use the restroom that is the farthest from your desk or workstation. · Use your morning and afternoon breaks to take quick 15-minute walks. Staying safe · Know your surroundings. Walk in a well-lighted, safe place. If it is dark, walk with a partner. Wear light-colored clothing. If you can, buy a vest or jacket that reflects light. · Carry a cell phone for emergencies. · Drink plenty of water. Take a water bottle with you when you walk. This is very important if it is hot out. · Be careful not to slip on wet or icy ground. You can buy \"grippers\" for your shoes to help keep you from slipping. · Pay attention to your walking surface. Use sidewalks and paths. · If you have breathing problems like asthma or COPD, ask your doctor when it is safe for you to walk outdoors. Cold, dry air, smog, pollen, or other things in the air could cause breathing problems. Where can you learn more? Go to http://bentley-gina.info/. Enter R159 in the search box to learn more about \"Walking for Exercise: Care Instructions. \" Current as of: May 27, 2016 Content Version: 11.2 © 0084-1554 [employer]over[employer]. Care instructions adapted under license by [employer] (which disclaims liability or warranty for this information). If you have questions about a medical condition or this instruction, always ask your healthcare professional. [address] any warranty or liability for your use of this information. Introducing hospitals & HEALTH SERVICES! Dear Chito Jeter: 
Thank you for requesting a DigitalMR account.   Our records indicate that you already have an active Seaters account. You can access your account anytime at [email or web address] Did you know that you can access your hospital and ER discharge instructions at any time in Seaters? You can also review all of your test results from your hospital stay or ER visit. Additional Information If you have questions, please visit the Frequently Asked Questions section of the Seaters website at [email or web address] Remember, Seaters is NOT to be used for urgent needs. For medical emergencies, dial 911. Now available from your iPhone and Android! Please provide this summary of care documentation to your next provider. Your primary care clinician is listed as Sari Tim. If you have any questions after today's visit, please call [phone].

## 2017-05-18 NOTE — PROGRESS NOTES
Pt given one on one diet education. Appears to have a good understanding of the diet progression, food choices, and dietary/exercise habits for successful weight loss and nourishment one month after surgery. The  material included: post-op diet progression and portion sizes (including low fat, low sugar food recommendations and emphasis on protein foods and protein supplements), good beverage choices, reading a food label, vitamins/minerals required after weight loss surgery, and encouraging dietary and exercise habits that lead to weight loss success. Pt also received a restaurant card, which tells restaurants that the patient had a procedure that decreases the size of their stomach so the restaurant may let them order off the children's menu, the senior's menu, or a smaller portion for a reduced rate.      Anne Cunningham RD

## 2017-05-18 NOTE — PROGRESS NOTES
Subjective:     Theron Tiwari  is a 43 y.o. female who presents for follow-up about 1 month following laparoscopic gastric bypass surgery. She is a former lap band pt who had her band removed in [place] in 2013. She has lost a total of 30 pounds since surgery. Body mass index is 36.65 kg/(m^2). Has lost 29% of EBW. Surgery related complication: NA     She is tolerating soft foods without difficulty and denies vomiting and abdominal pain. Previously reported epigastric pressure has resolved with increasing omeprazole to bid for 3 days, then daily dosing and addition of carafate. Fluid intake: good  Protein intake: good - food + protein shakes  Taking recommended multivitamins. Pt has appt with dietician after this office visit. The patient's exercise level: moderately active. . Walking. Changes in her medical history and medications have been reviewed.       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    Intestinal malabsorption K90.9    S/P bariatric surgery Z98.84     Past Medical History:   Diagnosis Date    Arthritic-like pain     Arthritis     osteo    Chronic back pain     GERD (gastroesophageal reflux disease)     History of adjustable gastric banding     placed 2011 / removed 2013 all in [address] Hourglass stricture of stomach     Irregular menses     due to PCOS    Migraine     Morbid obesity ([facility]     Morbid obesity with BMI of 40.0-44.9, adult ([facility]     Nausea & vomiting     severe    PCOS (polycystic ovarian syndrome)     AVELINO (stress urinary incontinence, female)      Past Surgical History:   Procedure Laterality Date    HX GI  2011    placment of gastric band in [facility] [address] HX GI  2013    removal of gastric band in [place] (not original surgeon)    HX HEENT      mulitple tympanic membrane surgeries as a child    HX TONSIL AND ADENOIDECTOMY      HX TUBAL LIGATION       Current Outpatient Prescriptions   Medication Sig Dispense Refill    omeprazole (PRILOSEC) 20 mg capsule Take 20 mg by mouth daily.  loratadine (CLARITIN) 10 mg tablet Take 10 mg by mouth.  ursodiol (TUNDE FORTE) 500 mg tablet Take 1 Tab by mouth daily. Take with food. 30 Tab 4    sucralfate (CARAFATE) 100 mg/mL suspension Take 10 mL by mouth four (4) times daily. 414 mL 1    L. RHAMNOSUS GG/INULIN (CULTURELLE PROBIOTICS PO) Take  by mouth.  multivitamin with iron (FLINTSTONES) chewable tablet Take 1 Tab by mouth two (2) times a day. Objective:     Visit Vitals    /65 (BP 1 Location: Left arm, BP Patient Position: Sitting)    Pulse 74    Ht 5' 2\" (1.575 m)    Wt 90.9 kg (200 lb 6.4 oz)    LMP 05/01/2017 (Exact Date)    SpO2 100%    BMI 36.65 kg/m2        Physical Exam:    General:  alert, cooperative, no distress, appears stated age   Heart:  Regular rate and rhythm. Lungs CTA. Respiratory effort appropriate. Abdomen:   abdomen is soft without significant tenderness, masses, organomegaly or guarding; Active bowel sounds all 4 quadrants. No hernia present. Incisions: healing well       Assessment:     1. History of Morbid obesity, status post  laparoscopic gastric bypass surgery. Doing well. 2.  GERD - controlled    Plan:     1. To start tunde forte for gallstone prevention. 2. To continue focus on hydration. 3. May advance diet to solid phase. Reminded to measure portions, continue high protein, low carbohydrate diet.  Reminded to eat regularly, to eat slowly & not to drink with meals. Diet reviewed with pt & handouts given. Pt verbalized understanding. 4. Reminded of importance of vitamin supplements. 5. To continue cardio exercise - adding resistance exercises in 2 weeks. Discussed with pt.  6. To continue current rx. To continue follow-up with PCP. 7. Encouraged attendance @ support group  8. I have discussed this plan and education material with patient. Pt verbalized understanding. 9. To follow-up in 1 month(s). To call if questions/concerns prior to office visit. Ms. Uzair Lemus has a reminder for a \"due or due soon\" health maintenance. I have asked that she contact her primary care provider for follow-up on this health maintenance.

## 2017-07-17 ENCOUNTER — OFFICE VISIT (OUTPATIENT)
Dept: SURGERY | Age: 37
End: 2017-07-17

## 2017-07-17 DIAGNOSIS — Z98.84 S/P BARIATRIC SURGERY: ICD-10-CM

## 2017-07-17 DIAGNOSIS — K90.9 INTESTINAL MALABSORPTION, UNSPECIFIED TYPE: Primary | ICD-10-CM

## 2017-07-17 RX ORDER — MAGNESIUM 200 MG
1000 TABLET ORAL DAILY
COMMUNITY

## 2017-07-17 NOTE — MR AVS SNAPSHOT
Visit Information Date & Time Provider Department Dept. Phone Encounter #  
 7/17/2017  3:30 PM Sol E[facility] [address] [facility] [phone] [id number] Your Appointments 9/18/2017  3:30 PM  
Office Visit with MD Denys Estrada [facility]) Appt Note: 4 mo po  
 [address] [place] [address] [place] [address] [address]  
  
   
 [address] [place] [address] Upcoming Health Maintenance Date Due DTaP/Tdap/Td series (1 - Tdap) 11/8/1999 PAP AKA CERVICAL CYTOLOGY 11/8/1999 INFLUENZA AGE 9 TO ADULT 8/1/2017 Allergies as of 7/17/2017  Review Complete On: 7/17/2017 By: Emili Coreas No Known Allergies Current Immunizations  Reviewed on 4/19/2017 No immunizations on file. Not reviewed this visit Vitals BP Pulse Height(growth percentile) Weight(growth percentile) SpO2 BMI  
 108/60 (BP 1 Location: Left arm, BP Patient Position: Sitting) 63 5' 2\" (1.575 m) 180 lb (81.6 kg) 100% 32.92 kg/m2 OB Status Smoking Status Having regular periods Never Smoker BMI and BSA Data Body Mass Index Body Surface Area  
 32.92 kg/m 2 1.89 m 2 Preferred Pharmacy Pharmacy Name Phone [address], [place] [phone] Your Updated Medication List  
  
   
This list is accurate as of: 7/17/17  4:05 PM.  Always use your most recent med list.  
  
  
  
  
 BIOTIN PO Take  by mouth. CALCIUM CITRATE PO Take  by mouth. CLARITIN 10 mg tablet Generic drug:  loratadine Take 10 mg by mouth. CULTURELLE PROBIOTICS PO Take  by mouth.  
  
 multivitamin with iron chewable tablet Commonly known as:  Lynn Sales Take 1 Tab by mouth two (2) times a day. PriLOSEC 20 mg capsule Generic drug:  omeprazole Take 20 mg by mouth daily. sucralfate 100 mg/mL suspension Commonly known as:  [place] Take 10 mL by mouth four (4) times daily. ursodiol 500 mg tablet Commonly known as:  TUNDE FORTE Take 1 Tab by mouth daily. Take with food. VITAMIN B COMPLEX PO Take  by mouth. VITAMIN B-12 1,000 mcg sublingual tablet Generic drug:  cyanocobalamin Take 1,000 mcg by mouth daily. VITAMIN D3 PO Take  by mouth. Patient Instructions Body Mass Index: Care Instructions Your Care Instructions Body mass index (BMI) can help you see if your weight is raising your risk for health problems. It uses a formula to compare how much you weigh with how tall you are. · A BMI lower than 18.5 is considered underweight. · A BMI between 18.5 and 24.9 is considered healthy. · A BMI between 25 and 29.9 is considered overweight. A BMI of 30 or higher is considered obese. If your BMI is in the normal range, it means that you have a lower risk for weight-related health problems. If your BMI is in the overweight or obese range, you may be at increased risk for weight-related health problems, such as high blood pressure, heart disease, stroke, arthritis or joint pain, and diabetes. If your BMI is in the underweight range, you may be at increased risk for health problems such as fatigue, lower protection (immunity) against illness, muscle loss, bone loss, hair loss, and hormone problems. BMI is just one measure of your risk for weight-related health problems. You may be at higher risk for health problems if you are not active, you eat an unhealthy diet, or you drink too much alcohol or use tobacco products. Follow-up care is a key part of your treatment and safety. Be sure to make and go to all appointments, and call your doctor if you are having problems. It's also a good idea to know your test results and keep a list of the medicines you take. How can you care for yourself at home? · Practice healthy eating habits. This includes eating plenty of fruits, vegetables, whole grains, lean protein, and low-fat dairy. · If your doctor recommends it, get more exercise. Walking is a good choice. Bit by bit, increase the amount you walk every day. Try for at least 30 minutes on most days of the week. · Do not smoke. Smoking can increase your risk for health problems. If you need help quitting, talk to your doctor about stop-smoking programs and medicines. These can increase your chances of quitting for good. · Limit alcohol to 2 drinks a day for men and 1 drink a day for women. Too much alcohol can cause health problems. If you have a BMI higher than 25 · Your doctor may do other tests to check your risk for weight-related health problems. This may include measuring the distance around your waist. A waist measurement of more than 40 inches in men or 35 inches in women can increase the risk of weight-related health problems. · Talk with your doctor about steps you can take to stay healthy or improve your health. You may need to make lifestyle changes to lose weight and stay healthy, such as changing your diet and getting regular exercise. If you have a BMI lower than 18.5 · Your doctor may do other tests to check your risk for health problems. · Talk with your doctor about steps you can take to stay healthy or improve your health. You may need to make lifestyle changes to gain or maintain weight and stay healthy, such as getting more healthy foods in your diet and doing exercises to build muscle. Where can you learn more? Go to http://bentley-gina.info/. Enter S176 in the search box to learn more about \"Body Mass Index: Care Instructions. \" Current as of: January 23, 2017 Content Version: 11.3 © 6065-8305 [employer].  Care instructions adapted under license by [address] (which disclaims liability or warranty for this information). If you have questions about a medical condition or this instruction, always ask your healthcare professional. [address] any warranty or liability for your use of this information. Introducing [facility] & HEALTH SERVICES! Dear Mindy Presser: 
Thank you for requesting a Wanshen account. Our records indicate that you already have an active Wanshen account. You can access your account anytime at [email or web address] Did you know that you can access your hospital and ER discharge instructions at any time in Wanshen? You can also review all of your test results from your hospital stay or ER visit. Additional Information If you have questions, please visit the Frequently Asked Questions section of the Wanshen website at [email or web address] Remember, Wanshen is NOT to be used for urgent needs. For medical emergencies, dial 911. Now available from your iPhone and Android! Please provide this summary of care documentation to your next provider. Your primary care clinician is listed as Shala Penaloza. If you have any questions after today's visit, please call [phone].

## 2017-07-17 NOTE — PATIENT INSTRUCTIONS
Body Mass Index: Care Instructions  Your Care Instructions    Body mass index (BMI) can help you see if your weight is raising your risk for health problems. It uses a formula to compare how much you weigh with how tall you are. · A BMI lower than 18.5 is considered underweight. · A BMI between 18.5 and 24.9 is considered healthy. · A BMI between 25 and 29.9 is considered overweight. A BMI of 30 or higher is considered obese. If your BMI is in the normal range, it means that you have a lower risk for weight-related health problems. If your BMI is in the overweight or obese range, you may be at increased risk for weight-related health problems, such as high blood pressure, heart disease, stroke, arthritis or joint pain, and diabetes. If your BMI is in the underweight range, you may be at increased risk for health problems such as fatigue, lower protection (immunity) against illness, muscle loss, bone loss, hair loss, and hormone problems. BMI is just one measure of your risk for weight-related health problems. You may be at higher risk for health problems if you are not active, you eat an unhealthy diet, or you drink too much alcohol or use tobacco products. Follow-up care is a key part of your treatment and safety. Be sure to make and go to all appointments, and call your doctor if you are having problems. It's also a good idea to know your test results and keep a list of the medicines you take. How can you care for yourself at home? · Practice healthy eating habits. This includes eating plenty of fruits, vegetables, whole grains, lean protein, and low-fat dairy. · If your doctor recommends it, get more exercise. Walking is a good choice. Bit by bit, increase the amount you walk every day. Try for at least 30 minutes on most days of the week. · Do not smoke. Smoking can increase your risk for health problems. If you need help quitting, talk to your doctor about stop-smoking programs and medicines. These can increase your chances of quitting for good. · Limit alcohol to 2 drinks a day for men and 1 drink a day for women. Too much alcohol can cause health problems. If you have a BMI higher than 25  · Your doctor may do other tests to check your risk for weight-related health problems. This may include measuring the distance around your waist. A waist measurement of more than 40 inches in men or 35 inches in women can increase the risk of weight-related health problems. · Talk with your doctor about steps you can take to stay healthy or improve your health. You may need to make lifestyle changes to lose weight and stay healthy, such as changing your diet and getting regular exercise. If you have a BMI lower than 18.5  · Your doctor may do other tests to check your risk for health problems. · Talk with your doctor about steps you can take to stay healthy or improve your health. You may need to make lifestyle changes to gain or maintain weight and stay healthy, such as getting more healthy foods in your diet and doing exercises to build muscle. Where can you learn more? Go to http://bentley-gina.info/. Enter S176 in the search box to learn more about \"Body Mass Index: Care Instructions. \"  Current as of: January 23, 2017  Content Version: 11.3  © 8230-7915 [employer]. Care instructions adapted under license by [employer] (which disclaims liability or warranty for this information). If you have questions about a medical condition or this instruction, always ask your healthcare professional. [place] [address] any warranty or liability for your use of this information.

## 2017-07-20 ENCOUNTER — PATIENT MESSAGE (OUTPATIENT)
Dept: SURGERY | Age: 37
End: 2017-07-20

## 2017-07-20 VITALS
OXYGEN SATURATION: 100 % | HEIGHT: 62 IN | DIASTOLIC BLOOD PRESSURE: 60 MMHG | RESPIRATION RATE: 16 BRPM | SYSTOLIC BLOOD PRESSURE: 108 MMHG | WEIGHT: 180 LBS | BODY MASS INDEX: 33.13 KG/M2 | HEART RATE: 63 BPM

## 2017-07-20 DIAGNOSIS — L65.9 HAIR THINNING: ICD-10-CM

## 2017-07-20 DIAGNOSIS — K21.9 GASTROESOPHAGEAL REFLUX DISEASE WITHOUT ESOPHAGITIS: ICD-10-CM

## 2017-07-20 DIAGNOSIS — K90.9 INTESTINAL MALABSORPTION, UNSPECIFIED TYPE: Primary | ICD-10-CM

## 2017-07-20 DIAGNOSIS — Z98.84 S/P BARIATRIC SURGERY: ICD-10-CM

## 2017-07-20 DIAGNOSIS — E55.9 HYPOVITAMINOSIS D: ICD-10-CM

## 2017-07-20 NOTE — PROGRESS NOTES
Subjective:     Celio Mena  is a 43 y.o. female who presents for follow-up about 3 months following laparoscopic gastric bypass surgery. She has lost a total of 50 pounds since surgery. Body mass index is 32.92 kg/(m^2). . EBWL is (48%). The patient presents today to assess their progress toward their goal of weight loss and to address any issues that may be present. Today the patient and I have reviewed their diet and how appropriate their food choices are. The following issues have been identified  - none from a surgical standpoint. Pain assessment - 0/10  . Surgery related complication: NA (prior banding pt)       She reports no real issues and denies vomiting, abdominal pain, diarrhea and difficulty breathing. The patient's exercise level: moderately active. Changes in her medical history and medications have been reviewed.     Patient Active Problem List   Diagnosis Code    Morbid obesity with BMI of 40.0-44.9, adult ([facility] [address] E66.01, Z68.41    Morbid obesity ([facility] [address] E66.01    History of adjustable gastric banding Z98.84    Migraine G43.909    Chronic back pain M54.9, G89.29    AVELINO (stress urinary incontinence, female) N39.3    PCOS (polycystic ovarian syndrome) E28.2    Irregular menses N92.6    GERD (gastroesophageal reflux disease) K21.9    Arthritic-like pain M25.50    Hourglass stricture of stomach K31.2    Intestinal malabsorption K90.9    S/P bariatric surgery Z98.84     Past Medical History:   Diagnosis Date    Arthritic-like pain     Arthritis     osteo    Chronic back pain     GERD (gastroesophageal reflux disease)     History of adjustable gastric banding     placed 2011 / removed 2013 all in [address] Hourglass stricture of stomach     Irregular menses     due to PCOS    Migraine     Morbid obesity ([facility] [address]     Morbid obesity with BMI of 40.0-44.9, adult (HCC)     Nausea & vomiting     severe    PCOS (polycystic ovarian syndrome)     AVELINO (stress urinary incontinence, female)      Past Surgical History:   Procedure Laterality Date    HX GI  2011    placment of gastric band in [facility] [address] HX GI  2013    removal of gastric band in [place] (not original surgeon)    HX HEENT      mulitple tympanic membrane surgeries as a child    HX TONSIL AND ADENOIDECTOMY      HX TUBAL LIGATION       Current Outpatient Prescriptions   Medication Sig Dispense Refill    CHOLECALCIFEROL, VITAMIN D3, (VITAMIN D3 PO) Take  by mouth.  CALCIUM CITRATE PO Take  by mouth.  cyanocobalamin (VITAMIN B-12) 1,000 mcg sublingual tablet Take 1,000 mcg by mouth daily.  BIOTIN PO Take  by mouth.  VITAMIN B COMPLEX PO Take  by mouth.  omeprazole (PRILOSEC) 20 mg capsule Take 20 mg by mouth daily.  loratadine (CLARITIN) 10 mg tablet Take 10 mg by mouth.  L. RHAMNOSUS GG/INULIN (CULTURELLE PROBIOTICS PO) Take  by mouth.  multivitamin with iron (FLINTSTONES) chewable tablet Take 1 Tab by mouth two (2) times a day.  ursodiol (TUNDE FORTE) 500 mg tablet Take 1 Tab by mouth daily. Take with food. 30 Tab 4    sucralfate (CARAFATE) 100 mg/mL suspension Take 10 mL by mouth four (4) times daily.  414 mL 1        Review of Symptoms:     General - No history or complaints of unexpected fever or chills  Head/Neck - No history or complaints of headache or dizziness  Cardiac - No history or complaints of chest pain, palpitations, or shortness of breath  Pulmonary - No history or complaints of shortness of breath or productive cough  Gastrointestinal - as noted above  Genitourinary - No history or complaints of hematuria/dysuria or renal lithiasis  Musculoskeletal - No history or complaints of joint  muscular weakness  Hematologic - No history of any bleeding episodes  Neurologic - No history or complaints of  migraine headaches or neurologic symptoms    Objective:     Visit Vitals    /60 (BP 1 Location: Left arm, BP Patient Position: Sitting)    Pulse 63    Resp 16    Ht 5' 2\" (1.575 m)    Wt 81.6 kg (180 lb)    SpO2 100%    BMI 32.92 kg/m2        Physical Exam:    General:  alert, cooperative, no distress, appears stated age   Lungs:   clear to auscultation bilaterally   Heart:  Regular rate and rhythm   Abdomen:   abdomen is soft without significant tenderness, masses, organomegaly or guarding; Incisions: healing well, no significant drainage         Assessment:     1. History of Morbid obesity, status post  laparoscopic gastric bypass surgery. Doing well; no concerns. .     Plan:     1. Remember to measure portions, continue low carbohydrate diet  2. Continue to expand solid foods. 3. Remember vitamin supplements - The importance of such was discussed regarding the malabsorptive issues that the surgery creates  4. Exercise regimen appears adequate. 5. Attend support group  6. Follow-up in 3 month(s). 7. Total time spent with the patient 30 minutes.   8. The patient understands the plan of action

## 2017-07-20 NOTE — TELEPHONE ENCOUNTER
From: Carmenza José  To: Gerard Rivas NP  Sent: 7/20/2017 9:30 AM EDT  Subject: Non-Urgent Medical Question    I am 3 mnths postop and have been losing my hair like crazy. I know this is normal, but how much longer will this happen? This began just before my 2 month brooks and I am having bald spots now. So far I can hide them but if I have to keep this up for several more months, I will be bald!! I am taking biotin daily 5,000 mcg and getting in my protein so if you know of any other tricks, please let me know. Also, Monday I came in and saw [facility]. He said I was doing so well that I could come back in 6 mnths. However Casie scheduled me for Sept. I didn't even catch that until the drive home but I was on hold for 10 minutes so I hung up. Could you double check on that for me and if I heard him correctly then I will call and change it.

## 2017-07-27 ENCOUNTER — PATIENT MESSAGE (OUTPATIENT)
Dept: SURGERY | Age: 37
End: 2017-07-27

## 2017-10-12 ENCOUNTER — OFFICE VISIT (OUTPATIENT)
Dept: SURGERY | Age: 37
End: 2017-10-12

## 2017-10-12 VITALS
HEART RATE: 71 BPM | WEIGHT: 157 LBS | OXYGEN SATURATION: 100 % | HEIGHT: 62 IN | DIASTOLIC BLOOD PRESSURE: 59 MMHG | SYSTOLIC BLOOD PRESSURE: 108 MMHG | BODY MASS INDEX: 28.89 KG/M2

## 2017-10-12 DIAGNOSIS — K90.9 INTESTINAL MALABSORPTION, UNSPECIFIED TYPE: Primary | ICD-10-CM

## 2017-10-12 DIAGNOSIS — E55.9 HYPOVITAMINOSIS D: ICD-10-CM

## 2017-10-12 DIAGNOSIS — Z98.84 S/P BARIATRIC SURGERY: ICD-10-CM

## 2017-10-12 DIAGNOSIS — K21.9 GASTROESOPHAGEAL REFLUX DISEASE WITHOUT ESOPHAGITIS: ICD-10-CM

## 2017-10-12 RX ORDER — ERGOCALCIFEROL 1.25 MG/1
50000 CAPSULE ORAL 2 TIMES WEEKLY
Qty: 52 CAP | Refills: 1 | Status: SHIPPED | OUTPATIENT
Start: 2017-10-13 | End: 2018-04-10

## 2017-10-12 NOTE — PATIENT INSTRUCTIONS
Body Mass Index: Care Instructions  Your Care Instructions    Body mass index (BMI) can help you see if your weight is raising your risk for health problems. It uses a formula to compare how much you weigh with how tall you are. · A BMI lower than 18.5 is considered underweight. · A BMI between 18.5 and 24.9 is considered healthy. · A BMI between 25 and 29.9 is considered overweight. A BMI of 30 or higher is considered obese. If your BMI is in the normal range, it means that you have a lower risk for weight-related health problems. If your BMI is in the overweight or obese range, you may be at increased risk for weight-related health problems, such as high blood pressure, heart disease, stroke, arthritis or joint pain, and diabetes. If your BMI is in the underweight range, you may be at increased risk for health problems such as fatigue, lower protection (immunity) against illness, muscle loss, bone loss, hair loss, and hormone problems. BMI is just one measure of your risk for weight-related health problems. You may be at higher risk for health problems if you are not active, you eat an unhealthy diet, or you drink too much alcohol or use tobacco products. Follow-up care is a key part of your treatment and safety. Be sure to make and go to all appointments, and call your doctor if you are having problems. It's also a good idea to know your test results and keep a list of the medicines you take. How can you care for yourself at home? · Practice healthy eating habits. This includes eating plenty of fruits, vegetables, whole grains, lean protein, and low-fat dairy. · If your doctor recommends it, get more exercise. Walking is a good choice. Bit by bit, increase the amount you walk every day. Try for at least 30 minutes on most days of the week. · Do not smoke. Smoking can increase your risk for health problems. If you need help quitting, talk to your doctor about stop-smoking programs and medicines. These can increase your chances of quitting for good. · Limit alcohol to 2 drinks a day for men and 1 drink a day for women. Too much alcohol can cause health problems. If you have a BMI higher than 25  · Your doctor may do other tests to check your risk for weight-related health problems. This may include measuring the distance around your waist. A waist measurement of more than 40 inches in men or 35 inches in women can increase the risk of weight-related health problems. · Talk with your doctor about steps you can take to stay healthy or improve your health. You may need to make lifestyle changes to lose weight and stay healthy, such as changing your diet and getting regular exercise. If you have a BMI lower than 18.5  · Your doctor may do other tests to check your risk for health problems. · Talk with your doctor about steps you can take to stay healthy or improve your health. You may need to make lifestyle changes to gain or maintain weight and stay healthy, such as getting more healthy foods in your diet and doing exercises to build muscle. Where can you learn more? Go to http://bentley-gina.info/. Enter S176 in the search box to learn more about \"Body Mass Index: Care Instructions. \"  Current as of: January 23, 2017  Content Version: 11.3  © 1346-9855 [employer]. Care instructions adapted under license by [employer] (which disclaims liability or warranty for this information). If you have questions about a medical condition or this instruction, always ask your healthcare professional. [place] [address] any warranty or liability for your use of this information. Patient Instructions      1. Remember hydration goals - minimum of 64 ounces of liquids per day (dehydration is the number one reason for hospital readmission).   2. Continue to monitor carbohydrate and protein intake you need a minimum of  Grams of protein daily- remember to keep your total carbohydrates to 50 grams or less per day for best results. 3. Continue to work towards exercise goals - 60-90 minutes, 5 times a week minimum of deliberate, aerobic exercise is the ultimate goal with strength training 2 times each week. Refer to [employer] for  information. 4. Remember to take vitamins as directed. 5. Attend support group the 2nd Thursday of each month. 6. Use Miralax if you become constipated. 7. Call us at [phone] or email us through [email or web address]" with questions,     concerns or worsening of condition, we have someone on call 24 hours a day. If you are unable to reach our office, you are to go to your Primary Care Physician or the Emergency Department. Supplement Resource Guide    Importance of Protein:   Maintains lean body mass, produces antibodies to fight off infections, heals wounds, minimizes hair loss, helps to give you energy, helps with satiety, and keeping you full between meals. Importance of Calcium:  Needed for healthy bones and teeth, normal blood clotting, and nervous system functioning, higher risk of osteoporosis and bone disease with non-compliance. Importance of Multivitamins: Many functions. Supply you with extra nutrients that you may be missing from food. May lead to iron deficiency anemia, weakness, fatigue, and many other symptoms with non-compliance. Importance of B Vitamins:  Important for red blood cell formation, metabolism, energy, and helps to maintain a healthy nervous system. Protein Supplement  Find one you like now. Use immediately after surgery. Look for:  35-50g protein each day from your protein supplement once you reach the progression diet. 0-3 g fat per serving  0-3 g sugar per serving    Protein drinks should be split in separate dosages.     Recommend: Lifelong  1 year + Calcium Supplement:     Start taking within a month after surgery. Look for: Calcium Citrate Plus D (1500 mg per day)  Recommend: Citracal     .            Avoid chocolate chewable calcium. Can use chewable bariatric or [employer] brand or similar chewable. The body cannot absorb more than 500-600 mg of calcium at a time. Take for Life Multi-vitamin Supplement:      Start immediately after surgery: any complete chewable, such as: [place]s Complete chewables. Avoid [place] sours or gummies. They lack iron and other important nutrients and also have added sugar. Continue with chewable vitamin or change to adult complete multivitamin one month after surgery. Menstruating women can take a prenatal vitamin. Make sure has at least 18 mg iron and 297-629 mcg folic acid   Vitamin O50, B Complex Vitamin, and Biotin  Start taking within a month after surgery. Vitamin B12:  1000 mcg of Vitamin B12 three times weekly    Must take sublingually (meaning you take it under your tongue) or in a liquid drop form for easy absorption. B Complex Vitamin: Take a pill or liquid drop form once daily. Biotin: This vitamin can help prevent hair loss.     Recommend 5mg   (5000 mcg) a day  Biotin is Optional

## 2017-10-12 NOTE — PROGRESS NOTES
Subjective:     Horacio Corona  is a 43 y.o. female who presents for follow-up about 6 months following laparoscopic gastric bypass surgery. She has lost a total of 73 pounds since surgery. Body mass index is 28.72 kg/(m^2). . EBWL is 70%. The patient presents today to assess their progress toward their goal of weight loss and to address any issues that may be present. Today the patient and I have reviewed their diet and how appropriate their food choices are. The no issues have been identified. Surgery related complication: NA (prior banding pt)       She reports no real issues and denies vomiting and abdominal pain. The patients diet choices have been reviewed today and are good. Still gets carb cravings occasionally. Patients pain score 0/10      The patient's exercise level: moderately active. Running half mile, doing cardio and weight training 4 days week. Changes in her medical history and medications have been reviewed. Reviewed lab results from 8/3/2017 with patient. Still at lower range of vit D with multiple capsules a day (pt not sure of units). Rarely has to take PPI for GERD.     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    Intestinal malabsorption K90.9    S/P bariatric surgery Z98.84     Past Medical History:   Diagnosis Date    Arthritic-like pain     Arthritis     osteo    Chronic back pain     GERD (gastroesophageal reflux disease)     History of adjustable gastric banding     placed 2011 / removed 2013 all in [place]  Hourglass stricture of stomach     Irregular menses     due to PCOS    Migraine     Morbid obesity (HCC)     Morbid obesity with BMI of 40.0-44.9, adult (HCC)     Nausea & vomiting     severe    PCOS (polycystic ovarian syndrome)     AVELINO (stress urinary incontinence, female)      Past Surgical History:   Procedure Laterality Date    HX GI  2011    placment of gastric band in [facility] [address] HX GI  2013    removal of gastric band in [place] (not original surgeon)    HX HEENT      mulitple tympanic membrane surgeries as a child    HX TONSIL AND ADENOIDECTOMY      HX TUBAL LIGATION       Current Outpatient Prescriptions   Medication Sig Dispense Refill    [START ON 10/13/2017] ergocalciferol (VITAMIN D2) 50,000 unit capsule Take 1 Cap by mouth two (2) times a week for 52 doses. 52 Cap 1    CHOLECALCIFEROL, VITAMIN D3, (VITAMIN D3 PO) Take  by mouth.  CALCIUM CITRATE PO Take  by mouth.  cyanocobalamin (VITAMIN B-12) 1,000 mcg sublingual tablet Take 1,000 mcg by mouth daily.  BIOTIN PO Take  by mouth.  VITAMIN B COMPLEX PO Take  by mouth.  omeprazole (PRILOSEC) 20 mg capsule Take 20 mg by mouth daily.  loratadine (CLARITIN) 10 mg tablet Take 10 mg by mouth.  multivitamin with iron (FLINTSTONES) chewable tablet Take 1 Tab by mouth two (2) times a day.  sucralfate (CARAFATE) 100 mg/mL suspension Take 10 mL by mouth four (4) times daily. 414 mL 1    L. RHAMNOSUS GG/INULIN (CULTURELLE PROBIOTICS PO) Take  by mouth.           Review of Symptoms:       General - No history or complaints of unexpected fever or chills  Head/Neck - No history or complaints of headache or dizziness  Cardiac - No history or complaints of chest pain, palpitations, or shortness of breath  Pulmonary - No history or complaints of shortness of breath or productive cough  Gastrointestinal - as noted above  Genitourinary - No history or complaints of hematuria/dysuria or renal lithiasis  Musculoskeletal - No history or complaints of joint  muscular weakness  Hematologic - No history of any bleeding episodes  Neurologic - No history or complaints of  migraine headaches or neurologic symptoms                     Objective:     Visit Vitals    /59 (BP 1 Location: Left arm, BP Patient Position: Sitting)    Pulse 71    Ht 5' 2\" (1.575 m)    Wt 71.2 kg (157 lb)    SpO2 100%    BMI 28.72 kg/m2        Physical Exam:    General:  alert, cooperative, no distress, appears stated age   Lungs:   clear to auscultation bilaterally   Heart:  Regular rate and rhythm, S1S2 present or without murmur or extra heart sounds   Abdomen:   abdomen is soft without significant tenderness, masses, organomegaly or guarding; Incisions: Well healed         Lab Results   Component Value Date/Time    WBC 6.7 04/10/2017 12:24 PM    HGB 12.0 04/10/2017 12:24 PM    HCT 35.7 04/10/2017 12:24 PM    PLATELET 557 42/07/7727 12:24 PM    MCV 86.2 04/10/2017 12:24 PM     Lab Results   Component Value Date/Time    Sodium 142 04/10/2017 12:24 PM    Potassium 4.1 04/10/2017 12:24 PM    Chloride 103 04/10/2017 12:24 PM    CO2 32 04/10/2017 12:24 PM    Anion gap 7 04/10/2017 12:24 PM    Glucose 80 04/10/2017 12:24 PM    BUN 14 04/10/2017 12:24 PM    Creatinine 0.71 04/10/2017 12:24 PM    BUN/Creatinine ratio 20 04/10/2017 12:24 PM    GFR est AA >60 04/10/2017 12:24 PM    GFR est non-AA >60 04/10/2017 12:24 PM    Calcium 8.9 04/10/2017 12:24 PM    Bilirubin, total 0.4 04/10/2017 12:24 PM    AST (SGOT) 68 04/10/2017 12:24 PM    Alk.  phosphatase 51 04/10/2017 12:24 PM    Protein, total 7.6 04/10/2017 12:24 PM    Albumin 4.1 04/10/2017 12:24 PM    Globulin 3.5 04/10/2017 12:24 PM    A-G Ratio 1.2 04/10/2017 12:24 PM    ALT (SGPT) 62 04/10/2017 12:24 PM     No results found for: IRON, FE, TIBC, IBCT, PSAT, FERR  No results found for: FOL, RBCF  No results found for: KYRA [place]          Assessment: 1. History of Morbid obesity, status post  laparoscopic gastric bypass surgery. Doing well; no concerns. 2. GERD - improved  3. AVELINO - improved  4. Hypovitaminosis D    Plan:     1. Remember to measure portions, continue low carbohydrate diet  2. Switch to twice weekly Rx of vit D  3. Continue to concentrate on protein intake meeting daily requirements  4. Remember vitamin supplements. The importance of such was discussed regarding the malabsorptive issues that the surgery creates. 5. Exercise regimen appears to be: adequate  6. Try and attend support group if feasible. 7. Follow-up in 3 month(s). 8. Lab reviewed and appropriate changes made. 9. Total time spent with the patient 30 minutes.

## 2017-10-12 NOTE — MR AVS SNAPSHOT
Visit Information Date & Time Provider Department Dept. Phone Encounter #  
 10/12/2017  4:30 PM [facility] [place] [phone] [id number] Follow-up Instructions Return in about 3 months (around 1/12/2018). Your Appointments 1/15/2018  1:00 PM  
Office Visit with [facility] [place] ([facility]) Appt Note: 6 mo po; r/s time [address] [place] [address] [place] [address]  
  
   
 [address] [facility] Upcoming Health Maintenance Date Due DTaP/Tdap/Td series (1 - Tdap) 11/8/1999 PAP AKA CERVICAL CYTOLOGY 11/8/1999 INFLUENZA AGE 9 TO ADULT 8/1/2017 Allergies as of 10/12/2017  Review Complete On: 10/12/2017 By: Carlene Guallpa NP No Known Allergies Current Immunizations  Reviewed on 4/19/2017 No immunizations on file. Not reviewed this visit You Were Diagnosed With   
  
 Codes Comments Intestinal malabsorption, unspecified type    -  Primary ICD-10-CM: K90.9 ICD-9-CM: 579.9 S/P bariatric surgery     ICD-10-CM: Z98.84 ICD-9-CM: V45.86 Hypovitaminosis D     ICD-10-CM: E55.9 ICD-9-CM: 268.9 Vitals BP Pulse Height(growth percentile) Weight(growth percentile) SpO2 BMI  
 108/59 (BP 1 Location: Left arm, BP Patient Position: Sitting) 71 5' 2\" (1.575 m) 157 lb (71.2 kg) 100% 28.72 kg/m2 OB Status Smoking Status Having regular periods Never Smoker BMI and BSA Data Body Mass Index Body Surface Area 28.72 kg/m 2 1.76 m 2 Preferred Pharmacy Pharmacy Name Phone [address], Susanne Hackett [phone] Your Updated Medication List  
  
   
This list is accurate as of: 10/12/17  4:47 PM.  Always use your most recent med list.  
  
  
  
  
 BIOTIN PO Take  by mouth. CALCIUM CITRATE PO Take  by mouth. CLARITIN 10 mg tablet Generic drug:  loratadine Take 10 mg by mouth. CULTURELLE PROBIOTICS PO Take  by mouth.  
  
 ergocalciferol 50,000 unit capsule Commonly known as:  VITAMIN D2 Take 1 Cap by mouth two (2) times a week for 52 doses. Start taking on:  10/13/2017  
  
 multivitamin with iron chewable tablet Commonly known as:  Bee Cheese Take 1 Tab by mouth two (2) times a day. PriLOSEC 20 mg capsule Generic drug:  omeprazole Take 20 mg by mouth daily. sucralfate 100 mg/mL suspension Commonly known as:  Ressie Flavin Take 10 mL by mouth four (4) times daily. VITAMIN B COMPLEX PO Take  by mouth. VITAMIN B-12 1,000 mcg sublingual tablet Generic drug:  cyanocobalamin Take 1,000 mcg by mouth daily. VITAMIN D3 PO Take  by mouth. Prescriptions Printed Refills  
 ergocalciferol (VITAMIN D2) 50,000 unit capsule 1 Starting on: 10/13/2017 Sig: Take 1 Cap by mouth two (2) times a week for 52 doses. Class: Print Route: Oral  
  
Follow-up Instructions Return in about 3 months (around 1/12/2018). Patient Instructions Body Mass Index: Care Instructions Your Care Instructions Body mass index (BMI) can help you see if your weight is raising your risk for health problems. It uses a formula to compare how much you weigh with how tall you are. · A BMI lower than 18.5 is considered underweight. · A BMI between 18.5 and 24.9 is considered healthy. · A BMI between 25 and 29.9 is considered overweight. A BMI of 30 or higher is considered obese. If your BMI is in the normal range, it means that you have a lower risk for weight-related health problems.  If your BMI is in the overweight or obese range, you may be at increased risk for weight-related health problems, such as high blood pressure, heart disease, stroke, arthritis or joint pain, and diabetes. If your BMI is in the underweight range, you may be at increased risk for health problems such as fatigue, lower protection (immunity) against illness, muscle loss, bone loss, hair loss, and hormone problems. BMI is just one measure of your risk for weight-related health problems. You may be at higher risk for health problems if you are not active, you eat an unhealthy diet, or you drink too much alcohol or use tobacco products. Follow-up care is a key part of your treatment and safety. Be sure to make and go to all appointments, and call your doctor if you are having problems. It's also a good idea to know your test results and keep a list of the medicines you take. How can you care for yourself at home? · Practice healthy eating habits. This includes eating plenty of fruits, vegetables, whole grains, lean protein, and low-fat dairy. · If your doctor recommends it, get more exercise. Walking is a good choice. Bit by bit, increase the amount you walk every day. Try for at least 30 minutes on most days of the week. · Do not smoke. Smoking can increase your risk for health problems. If you need help quitting, talk to your doctor about stop-smoking programs and medicines. These can increase your chances of quitting for good. · Limit alcohol to 2 drinks a day for men and 1 drink a day for women. Too much alcohol can cause health problems. If you have a BMI higher than 25 · Your doctor may do other tests to check your risk for weight-related health problems. This may include measuring the distance around your waist. A waist measurement of more than 40 inches in men or 35 inches in women can increase the risk of weight-related health problems. · Talk with your doctor about steps you can take to stay healthy or improve your health. You may need to make lifestyle changes to lose weight and stay healthy, such as changing your diet and getting regular exercise. If you have a BMI lower than 18.5 · Your doctor may do other tests to check your risk for health problems. · Talk with your doctor about steps you can take to stay healthy or improve your health. You may need to make lifestyle changes to gain or maintain weight and stay healthy, such as getting more healthy foods in your diet and doing exercises to build muscle. Where can you learn more? Go to http://bentley-gina.info/. Enter S176 in the search box to learn more about \"Body Mass Index: Care Instructions. \" Current as of: January 23, 2017 Content Version: 11.3 © 0462-5139 textmetix. Care instructions adapted under license by [employer] (which disclaims liability or warranty for this information). If you have questions about a medical condition or this instruction, always ask your healthcare professional. [address] any warranty or liability for your use of this information. Patient Instructions 1. Remember hydration goals - minimum of 64 ounces of liquids per day (dehydration is the number one reason for hospital readmission). 2. Continue to monitor carbohydrate and protein intake you need a minimum of  Grams of protein daily- remember to keep your total carbohydrates to 50 grams or less per day for best results. 3. Continue to work towards exercise goals - 60-90 minutes, 5 times a week minimum of deliberate, aerobic exercise is the ultimate goal with strength training 2 times each week. Refer to Trusera for  information. 4. Remember to take vitamins as directed. 5. Attend support group the 2nd Thursday of each month. 6. Use Miralax if you become constipated.  
7. Call us at [phone] or email us through [email or web address]" with questions,     concerns or worsening of condition, we have someone on call 24 hours a day.  If you are unable to reach our office, you are to go to your Primary Care Physician or the Emergency Department. Supplement Resource Guide Importance of Protein:  
Maintains lean body mass, produces antibodies to fight off infections, heals wounds, minimizes hair loss, helps to give you energy, helps with satiety, and keeping you full between meals. Importance of Calcium: 
Needed for healthy bones and teeth, normal blood clotting, and nervous system functioning, higher risk of osteoporosis and bone disease with non-compliance. Importance of Multivitamins: Many functions. Supply you with extra nutrients that you may be missing from food. May lead to iron deficiency anemia, weakness, fatigue, and many other symptoms with non-compliance. Importance of B Vitamins: 
Important for red blood cell formation, metabolism, energy, and helps to maintain a healthy nervous system. Protein Supplement Find one you like now. Use immediately after surgery. Look for: 
35-50g protein each day from your protein supplement once you reach the progression diet. 0-3 g fat per serving 0-3 g sugar per serving Protein drinks should be split in separate dosages. Recommend: Lifelong 1 year + Calcium Supplement:  
 
Start taking within a month after surgery. Look for: Calcium Citrate Plus D (1500 mg per day) Recommend: Citracal 
 
 . Avoid chocolate chewable calcium. Can use chewable bariatric or GNC brand or similar chewable. The body cannot absorb more than 500-600 mg of calcium at a time. Take for Life Multi-vitamin Supplement:   
 
Start immediately after surgery: any complete chewable, such as: [place]s Complete chewables. Avoid [place] sours or gummies. They lack iron and other important nutrients and also have added sugar.  
 
Continue with chewable vitamin or change to adult complete multivitamin one month after surgery. Menstruating women can take a prenatal vitamin. Make sure has at least 18 mg iron and 388-289 mcg folic acid Vitamin B12, B Complex Vitamin, and Biotin Start taking within a month after surgery. Vitamin B12:  1000 mcg of Vitamin B12 three times weekly Must take sublingually (meaning you take it under your tongue) or in a liquid drop form for easy absorption. B Complex Vitamin: Take a pill or liquid drop form once daily. Biotin: This vitamin can help prevent hair loss. Recommend 5mg  
(5000 mcg) a day Biotin is Optional  
 
 
 
 
 
  
Introducing [facility]! Dear Yuki Armas: 
Thank you for requesting a [employer] account. Our records indicate that you already have an active [employer] account. You can access your account anytime at [email or web address] direct marketing [email or web address]. [employer] concepts Did you know that you can access your hospital and ER discharge instructions at any time in [employer]? You can also review all of your test results from your hospital stay or ER visit. Additional Information If you have questions, please visit the Frequently Asked Questions section of the [employer] website at [email or web address] direct [email or web address] [employer]/. Remember, [employer] is NOT to be used for urgent needs. For medical emergencies, dial 911. Now available from your iPhone and Android! Please provide this summary of care documentation to your next provider. Your primary care clinician is listed as Viola Pandya. If you have any questions after today's visit, please call [phone].

## 2018-01-26 ENCOUNTER — HOSPITAL ENCOUNTER (OUTPATIENT)
Dept: LAB | Age: 38
Discharge: HOME | End: 2018-01-26
Attending: SPECIALIST
Payer: COMMERCIAL

## 2018-01-26 ENCOUNTER — OFFICE VISIT (OUTPATIENT)
Dept: SURGERY | Age: 38
End: 2018-01-26

## 2018-01-26 VITALS
SYSTOLIC BLOOD PRESSURE: 113 MMHG | DIASTOLIC BLOOD PRESSURE: 66 MMHG | HEIGHT: 62 IN | BODY MASS INDEX: 25.86 KG/M2 | HEART RATE: 55 BPM | OXYGEN SATURATION: 100 % | WEIGHT: 140.5 LBS

## 2018-01-26 DIAGNOSIS — K90.9 INTESTINAL MALABSORPTION, UNSPECIFIED TYPE: ICD-10-CM

## 2018-01-26 DIAGNOSIS — K90.9 INTESTINAL MALABSORPTION, UNSPECIFIED TYPE: Primary | ICD-10-CM

## 2018-01-26 DIAGNOSIS — Z98.84 S/P BARIATRIC SURGERY: ICD-10-CM

## 2018-01-26 LAB
25(OH)D3 SERPL-MCNC: 127.5 NG/ML (ref 30–100)
ALBUMIN SERPL-MCNC: 4.1 G/DL (ref 3.4–5)
ALBUMIN/GLOB SERPL: 1.3 {RATIO} (ref 0.8–1.7)
ALP SERPL-CCNC: 60 U/L (ref 45–117)
ALT SERPL-CCNC: 53 U/L (ref 13–56)
ANION GAP SERPL CALC-SCNC: 11 MMOL/L (ref 3–18)
AST SERPL-CCNC: 26 U/L (ref 15–37)
BASOPHILS # BLD: 0 K/UL (ref 0–0.06)
BASOPHILS NFR BLD: 0 % (ref 0–2)
BILIRUB SERPL-MCNC: 0.4 MG/DL (ref 0.2–1)
BUN SERPL-MCNC: 21 MG/DL (ref 7–18)
BUN/CREAT SERPL: 34 (ref 12–20)
CALCIUM SERPL-MCNC: 8.9 MG/DL (ref 8.5–10.1)
CHLORIDE SERPL-SCNC: 104 MMOL/L (ref 100–108)
CO2 SERPL-SCNC: 30 MMOL/L (ref 21–32)
CREAT SERPL-MCNC: 0.61 MG/DL (ref 0.6–1.3)
DIFFERENTIAL METHOD BLD: ABNORMAL
EOSINOPHIL # BLD: 0 K/UL (ref 0–0.4)
EOSINOPHIL NFR BLD: 1 % (ref 0–5)
ERYTHROCYTE [DISTWIDTH] IN BLOOD BY AUTOMATED COUNT: 13.4 % (ref 11.6–14.5)
FERRITIN SERPL-MCNC: 73 NG/ML (ref 8–388)
FOLATE SERPL-MCNC: >20 NG/ML (ref 3.1–17.5)
GLOBULIN SER CALC-MCNC: 3.2 G/DL (ref 2–4)
GLUCOSE SERPL-MCNC: 85 MG/DL (ref 74–99)
HCT VFR BLD AUTO: 35.7 % (ref 35–45)
HGB BLD-MCNC: 11.9 G/DL (ref 12–16)
IRON SERPL-MCNC: 77 UG/DL (ref 50–175)
LYMPHOCYTES # BLD: 1.7 K/UL (ref 0.9–3.6)
LYMPHOCYTES NFR BLD: 39 % (ref 21–52)
MCH RBC QN AUTO: 29.7 PG (ref 24–34)
MCHC RBC AUTO-ENTMCNC: 33.3 G/DL (ref 31–37)
MCV RBC AUTO: 89 FL (ref 74–97)
MONOCYTES # BLD: 0.3 K/UL (ref 0.05–1.2)
MONOCYTES NFR BLD: 8 % (ref 3–10)
NEUTS SEG # BLD: 2.2 K/UL (ref 1.8–8)
NEUTS SEG NFR BLD: 52 % (ref 40–73)
PLATELET # BLD AUTO: 177 K/UL (ref 135–420)
PMV BLD AUTO: 10.7 FL (ref 9.2–11.8)
POTASSIUM SERPL-SCNC: 4.3 MMOL/L (ref 3.5–5.5)
PROT SERPL-MCNC: 7.3 G/DL (ref 6.4–8.2)
RBC # BLD AUTO: 4.01 M/UL (ref 4.2–5.3)
SODIUM SERPL-SCNC: 145 MMOL/L (ref 136–145)
VIT B12 SERPL-MCNC: >2000 PG/ML (ref 211–911)
WBC # BLD AUTO: 4.2 K/UL (ref 4.6–13.2)

## 2018-01-26 PROCEDURE — 80053 COMPREHEN METABOLIC PANEL: CPT | Performed by: PHYSICIAN ASSISTANT

## 2018-01-26 PROCEDURE — 82607 VITAMIN B-12: CPT | Performed by: PHYSICIAN ASSISTANT

## 2018-01-26 PROCEDURE — 82728 ASSAY OF FERRITIN: CPT | Performed by: PHYSICIAN ASSISTANT

## 2018-01-26 PROCEDURE — 85025 COMPLETE CBC W/AUTO DIFF WBC: CPT | Performed by: PHYSICIAN ASSISTANT

## 2018-01-26 PROCEDURE — 84425 ASSAY OF VITAMIN B-1: CPT | Performed by: PHYSICIAN ASSISTANT

## 2018-01-26 PROCEDURE — 36415 COLL VENOUS BLD VENIPUNCTURE: CPT | Performed by: PHYSICIAN ASSISTANT

## 2018-01-26 PROCEDURE — 82306 VITAMIN D 25 HYDROXY: CPT | Performed by: PHYSICIAN ASSISTANT

## 2018-01-26 PROCEDURE — 83540 ASSAY OF IRON: CPT | Performed by: PHYSICIAN ASSISTANT

## 2018-01-26 NOTE — PROGRESS NOTES
Subjective:      Minoo Solano is a 42 y.o. female is now 9 months status post laparoscopic gastric bypass surgery. Doing well overall. She has lost a total of 89 pounds since surgery. Body mass index is 25.7 kg/(m^2). Has lost 85% of EBW. Currently on a solid food diet without difficulty, reports no issues and denies vomiting and abdominal pain. Taking in 70oz water daily. Sources of protein include shakes, lean meats, mostly chicken, cheese, turkey, eggs. 45 min of activity 5 days a week, including going to the gym and has a treadmill at home. Bowel movements are regular. The patient is not having any pain. . The patient is compliant with multivitamins, calcium, Vit D and B12 supplements.       Weight Loss Metrics 1/26/2018 10/12/2017 7/17/2017 5/18/2017 5/3/2017 4/18/2017 4/10/2017   Today's Wt 140 lb 8 oz 157 lb 180 lb 200 lb 6.4 oz 210 lb 227 lb 6 oz 230 lb   BMI 25.7 kg/m2 28.72 kg/m2 32.92 kg/m2 36.65 kg/m2 38.41 kg/m2 41.59 kg/m2 42.07 kg/m2          Comorbidities:    Hypertension: not applicable  Diabetes: not applicable  Obstructive Sleep Apnea: not applicable  Hyperlipidemia: not applicable  Stress Urinary Incontinence: improved  Gastroesophageal Reflux: resolved  Weight related arthropathy:improved, bilateral knee pain     Patient Active Problem List   Diagnosis Code    Morbid obesity with BMI of 40.0-44.9, adult ([facility] [address] E66.01, Z68.41    Morbid obesity ([facility] [address] E66.01    History of adjustable gastric banding Z98.84    Migraine G43.909    Chronic back pain M54.9, G89.29    AVELINO (stress urinary incontinence, female) N39.3    PCOS (polycystic ovarian syndrome) E28.2    Irregular menses N92.6    GERD (gastroesophageal reflux disease) K21.9    Arthritic-like pain M25.50    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 stricture of stomach     Irregular menses     due to PCOS    Migraine     Morbid obesity ([address]     Morbid obesity with BMI of 40.0-44.9, adult (HCC)     Nausea & vomiting     severe    PCOS (polycystic ovarian syndrome)     AVELINO (stress urinary incontinence, female)        Past Surgical History:   Procedure Laterality Date    HX GI  2011    placment of gastric band [place] [facility] [place] HX GI  2013    removal of gastric band in [place] (not original surgeon)    HX HEENT      mulitple tympanic membrane surgeries as a child    HX TONSIL AND ADENOIDECTOMY      HX TUBAL LIGATION         Current Outpatient Prescriptions   Medication Sig Dispense Refill    ergocalciferol (VITAMIN D2) 50,000 unit capsule Take 1 Cap by mouth two (2) times a week for 52 doses. 52 Cap 1    CALCIUM CITRATE PO Take  by mouth.  cyanocobalamin (VITAMIN B-12) 1,000 mcg sublingual tablet Take 1,000 mcg by mouth daily.  BIOTIN PO Take  by mouth.  VITAMIN B COMPLEX PO Take  by mouth.  loratadine (CLARITIN) 10 mg tablet Take 10 mg by mouth.  L. RHAMNOSUS GG/INULIN (CULTURELLE PROBIOTICS PO) Take  by mouth.  multivitamin with iron (FLINTSTONES) chewable tablet Take 1 Tab by mouth two (2) times a day.          No Known Allergies    Review of Systems:  General - No history or complaints of unexpected fever or chills  Head/Neck - No history or complaints of headache or dizziness  Cardiac - No history or complaints of chest pain, palpitations, or shortness of breath  Pulmonary - No history or complaints of shortness of breath or productive cough  Gastrointestinal - as noted above  Genitourinary - No history or complaints of hematuria/dysuria or renal lithiasis  Musculoskeletal - No history or complaints of joint  muscular weakness  Hematologic - No history of any bleeding episodes  Neurologic - No history or complaints of  migraine headaches or neurologic symptoms    Objective:     Visit Vitals    /66 (BP 1 Location: Right arm, BP Patient Position: Sitting)    Pulse (!) 55    Ht 5' 2\" (1.575 m)    Wt 63.7 kg (140 lb 8 oz)    SpO2 100%    BMI 25.7 kg/m2       General:  alert, cooperative, no distress, appears stated age   Chest: lungs clear to auscultation, no rhonchi, rales or wheezes, no accessory muscle use   Cor:   Regular rate and rhythm or without murmur or extra heart sounds   Abdomen: soft, bowel sounds active, non-tender, no masses or organomegaly   Incisions:   healing well, no drainage, no erythema, no hernia, no seroma, no swelling, no dehiscence, incision well approximated       Assessment:   History of Morbid obesity, status post laparoscopic gastric bypass surgery. Doing well postoperatively. Plan:       1. Discussed patients weight loss goals and dietary choices in relation to goals. 2. Reminded to measure portions, continue high protein, low carbohydrate diet. Reminded to eat regularly, to eat slowly & not to drink with meals. 3. Continue vitamin supplementation  4. Continue current medications and follow up with PCP for management of regimen. 5. Continue cardio exercise and add resistance exercises. 60-90 minutes of aerobic activity 5 days a week and strength training 2 days each week. 6. Encouraged to attend support group   7. Patient to complete labs before next visit. Lab slip given today. 8. I have discussed this plan with patient and they verbalized understanding  9. Follow up in 3 months or sooner if patient has questions, concerns or worsening of condition, if unable to reach our office, patient should report to the ED. 8. Ms. Ramandeep Joaquin has a reminder for a \"due or due soon\" health maintenance. I have asked that she contact her primary care provider for a follow-up on this health maintenance.

## 2018-01-26 NOTE — MR AVS SNAPSHOT
Angie Yañez 
 
 
 [facility] [address] [address] [facility] 
[phone] Patient: Guevara Jean MRN: [id number] :1980 Visit Information Date & Time Provider Department Dept. Phone Encounter #  
 2018 10:00 AM Cornelia Jeter, 82 [facility] [id number] Follow-up Instructions Return in about 3 months (around 2018). Follow-up and Disposition History Upcoming Health Maintenance Date Due DTaP/Tdap/Td series (1 - Tdap) 1999 PAP AKA CERVICAL CYTOLOGY 1999 Influenza Age 5 to Adult 2017 Allergies as of 2018  Review Complete On: 2018 By: JOSSE Carrion No Known Allergies Current Immunizations  Reviewed on 2017 No immunizations on file. Not reviewed this visit You Were Diagnosed With   
  
 Codes Comments Intestinal malabsorption, unspecified type    -  Primary ICD-10-CM: K90.9 ICD-9-CM: 579.9 S/P bariatric surgery     ICD-10-CM: Z98.84 ICD-9-CM: V45.86 Vitals BP Pulse Height(growth percentile) Weight(growth percentile) SpO2 BMI  
 113/66 (BP 1 Location: Right arm, BP Patient Position: Sitting) (!) 55 5' 2\" (1.575 m) 140 lb 8 oz (63.7 kg) 100% 25.7 kg/m2 OB Status Smoking Status Having regular periods Never Smoker BMI and BSA Data Body Mass Index Body Surface Area 25.7 kg/m 2 1.67 m 2 Preferred Pharmacy Pharmacy Name Phone [address], Susanne Antony [place] [phone] Your Updated Medication List  
  
   
This list is accurate as of: 18 10:50 AM.  Always use your most recent med list.  
  
  
  
  
 BIOTIN PO Take  by mouth. CALCIUM CITRATE PO Take  by mouth. CLARITIN 10 mg tablet Generic drug:  loratadine Take 10 mg by mouth. CULTURELLE PROBIOTICS PO Take  by mouth. ergocalciferol 50,000 unit capsule Commonly known as:  VITAMIN D2 Take 1 Cap by mouth two (2) times a week for 52 doses. multivitamin with iron chewable tablet Commonly known as:  [place] Dance Take 1 Tab by mouth two (2) times a day. VITAMIN B COMPLEX PO Take  by mouth. VITAMIN B-12 1,000 mcg sublingual tablet Generic drug:  cyanocobalamin Take 1,000 mcg by mouth daily. Follow-up Instructions Return in about 3 months (around 4/26/2018). To-Do List   
 01/26/2018 Lab:  CBC WITH AUTOMATED DIFF   
  
 01/26/2018 Lab:  FERRITIN   
  
 01/26/2018 Lab:  IRON   
  
 01/26/2018 Lab:  METABOLIC PANEL, COMPREHENSIVE   
  
 01/26/2018 Lab:  VITAMIN B1, WHOLE BLOOD   
  
 01/26/2018 Lab:  VITAMIN B12 & FOLATE   
  
 01/26/2018 Lab:  VITAMIN D, 25 HYDROXY Patient Instructions Patient Instructions 1. Remember hydration goals - minimum of 64 ounces of liquids per day (dehydration is the number one reason for hospital readmission). 2. Continue to monitor carbohydrate and protein intake you need a minimum of  Grams of protein daily- remember to keep your total carbohydrates to 50 grams or less per day for best results. 3. Continue to work towards exercise goals - 60-90 minutes, 5 times a week minimum of deliberate, aerobic exercise is the ultimate goal with strength training 2 times each week. Refer to [employer] for  information. 4. Remember to take vitamins as directed in your handbook. 5. Attend support group the 2nd Thursday of each month. 6. Use Miralax if you become constipated. 7. Call us at [phone] or email us through [facility] with questions,     concerns or worsening of condition, we have someone on call 24 hours a day.   If you are unable to reach our office, you are to go to your Primary Care Physician or the Emergency Department. Supplement Resource Guide Importance of Protein:  
Maintains lean body mass, produces antibodies to fight off infections, heals wounds, minimizes hair loss, helps to give you energy, helps with satiety, and keeping you full between meals. Importance of Calcium: 
Needed for healthy bones and teeth, normal blood clotting, and nervous system functioning, higher risk of osteoporosis and bone disease with non-compliance. Importance of Multivitamins: Many functions. Supply you with extra nutrients that you may be missing from food. May lead to iron deficiency anemia, weakness, fatigue, and many other symptoms with non-compliance. Importance of B Vitamins: 
Important for red blood cell formation, metabolism, energy, and helps to maintain a healthy nervous system. Protein Supplement Find one you like now. Use immediately after surgery. Look for: 
35-50g protein each day from your protein supplement once you reach the progression diet. 0-3 g fat per serving 0-3 g sugar per serving Protein drinks should be split in separate dosages. Recommend: Lifelong 1 year + Calcium Supplement:  
 
Start taking within a month after surgery. Look for: Calcium Citrate Plus D (1500 mg per day) Recommend: Citracal 
 
 . Avoid chocolate chewable calcium. Can use chewable bariatric or GNC brand or similar chewable. The body cannot absorb more than 500-600 mg of calcium at a time. Take for Life Multi-vitamin Supplement:   
 
Start immediately after surgery: any complete chewable, such as: [place]s Complete chewables. Avoid [place] sours or gummies. They lack iron and other important nutrients and also have added sugar. Continue with chewable vitamin or change to adult complete multivitamin one month after surgery. Menstruating women can take a prenatal vitamin. Make sure has at least 18 mg iron and 971-930 mcg folic acid Vitamin B12, B Complex Vitamin, and Biotin Start taking within a month after surgery. Vitamin B12:  1000 mcg of Vitamin B12 three times weekly Must take sublingually (meaning you take it under your tongue) or in a liquid drop form for easy absorption. B Complex Vitamin: Take a pill or liquid drop form once daily. Biotin: This vitamin can help prevent hair loss. Recommend 5mg  
(5000 mcg) a day Biotin is Optional  
 
 
 
 
 
 
  
Introducing [facility] SERVICES! Dear Krysta Naranjo: 
Thank you for requesting a [employer] account. Our records indicate that you already have an active [employer] account. You can access your account anytime at [email or web address] Did you know that you can access your hospital and ER discharge instructions at any time in [employer]? You can also review all of your test results from your hospital stay or ER visit. Additional Information If you have questions, please visit the Frequently Asked Questions section of the [employer] website at [email or web address] Remember, [employer] is NOT to be used for urgent needs. For medical emergencies, dial 911. Now available from your iPhone and Android! Please provide this summary of care documentation to your next provider. Your primary care clinician is listed as Cam Kelley. If you have any questions after today's visit, please call [phone].

## 2018-01-26 NOTE — PATIENT INSTRUCTIONS
Patient Instructions      1. Remember hydration goals - minimum of 64 ounces of liquids per day (dehydration is the number one reason for hospital readmission). 2. Continue to monitor carbohydrate and protein intake you need a minimum of  Grams of protein daily- remember to keep your total carbohydrates to 50 grams or less per day for best results. 3. Continue to work towards exercise goals - 60-90 minutes, 5 times a week minimum of deliberate, aerobic exercise is the ultimate goal with strength training 2 times each week. Refer to [employer] for  information. 4. Remember to take vitamins as directed in your handbook. 5. Attend support group the 2nd Thursday of each month. 6. Use Miralax if you become constipated. 7. Call us at [phone] or email us through [email or web address]" with questions,     concerns or worsening of condition, we have someone on call 24 hours a day. If you are unable to reach our office, you are to go to your Primary Care Physician or the Emergency Department. Supplement Resource Guide    Importance of Protein:   Maintains lean body mass, produces antibodies to fight off infections, heals wounds, minimizes hair loss, helps to give you energy, helps with satiety, and keeping you full between meals. Importance of Calcium:  Needed for healthy bones and teeth, normal blood clotting, and nervous system functioning, higher risk of osteoporosis and bone disease with non-compliance. Importance of Multivitamins: Many functions. Supply you with extra nutrients that you may be missing from food. May lead to iron deficiency anemia, weakness, fatigue, and many other symptoms with non-compliance. Importance of B Vitamins:  Important for red blood cell formation, metabolism, energy, and helps to maintain a healthy nervous system. Protein Supplement  Find one you like now. Use immediately after surgery. Look for:  35-50g protein each day from your protein supplement once you reach the progression diet. 0-3 g fat per serving  0-3 g sugar per serving    Protein drinks should be split in separate dosages. Recommend: Lifelong  1 year + Calcium Supplement:     Start taking within a month after surgery. Look for: Calcium Citrate Plus D (1500 mg per day)  Recommend: Citracal     .            Avoid chocolate chewable calcium. Can use chewable bariatric or GNC brand or similar chewable. The body cannot absorb more than 500-600 mg of calcium at a time. Take for Life Multi-vitamin Supplement:      Start immediately after surgery: any complete chewable, such as: Kimberlys Complete chewables. Avoid Kimberly sours or gummies. They lack iron and other important nutrients and also have added sugar. Continue with chewable vitamin or change to adult complete multivitamin one month after surgery. Menstruating women can take a prenatal vitamin. Make sure has at least 18 mg iron and 037-141 mcg folic acid   Vitamin G82, B Complex Vitamin, and Biotin  Start taking within a month after surgery. Vitamin B12:  1000 mcg of Vitamin B12 three times weekly    Must take sublingually (meaning you take it under your tongue) or in a liquid drop form for easy absorption. B Complex Vitamin: Take a pill or liquid drop form once daily. Biotin: This vitamin can help prevent hair loss.     Recommend 5mg   (5000 mcg) a day  Biotin is Optional

## 2018-01-31 ENCOUNTER — TELEPHONE (OUTPATIENT)
Dept: SURGERY | Age: 38
End: 2018-01-31

## 2018-01-31 NOTE — TELEPHONE ENCOUNTER
----- Message from Rene Diop [place] sent at 1/30/2018  8:40 AM EST -----  Advise patient to take her Vit D 1 time a week instead of 2 times a week. Patient advised of lab results and instructions. Pt verbalized understanding.

## 2018-02-02 LAB — VIT B1 BLD-SCNC: 141.1 NMOL/L

## 2018-04-03 ENCOUNTER — OFFICE VISIT (OUTPATIENT)
Dept: SURGERY | Age: 38
End: 2018-04-03

## 2018-04-03 VITALS
BODY MASS INDEX: 25.75 KG/M2 | DIASTOLIC BLOOD PRESSURE: 64 MMHG | HEIGHT: 62 IN | SYSTOLIC BLOOD PRESSURE: 100 MMHG | OXYGEN SATURATION: 100 % | HEART RATE: 55 BPM | WEIGHT: 139.9 LBS

## 2018-04-03 DIAGNOSIS — K90.9 INTESTINAL MALABSORPTION, UNSPECIFIED TYPE: Primary | ICD-10-CM

## 2018-04-03 DIAGNOSIS — Z98.84 S/P BARIATRIC SURGERY: ICD-10-CM

## 2018-04-03 RX ORDER — OMEPRAZOLE 20 MG/1
20 CAPSULE, DELAYED RELEASE ORAL DAILY
COMMUNITY

## 2018-04-03 NOTE — PROGRESS NOTES
Subjective:      Clara Gunn is a 42 y.o. female is now 1 years status post laparoscopic gastric bypass surgery. Doing well overall. She has lost a total of 91 pounds since surgery. Body mass index is 25.59 kg/(m^2). Has lost 87% of EBW. Currently on a solid food diet without difficulty, reports no issues and denies vomiting and abdominal pain. Taking in 60oz water daily. Sources of protein include shakes, chicken, she eats all types of meats. Eats wheat crackers some days. 45-60 min of activity 4-5 days a week, including machines and elliptical.    Bowel movements are constipated. The patient is not having any pain. . The patient is compliant with multivitamins, calcium, Vit D and B12 supplements.      Weight Loss Metrics 4/3/2018 1/26/2018 10/12/2017 7/17/2017 5/18/2017 5/3/2017 4/18/2017   Today's Wt 139 lb 14.4 oz 140 lb 8 oz 157 lb 180 lb 200 lb 6.4 oz 210 lb 227 lb 6 oz   BMI 25.59 kg/m2 25.7 kg/m2 28.72 kg/m2 32.92 kg/m2 36.65 kg/m2 38.41 kg/m2 41.59 kg/m2          Comorbidities:    Hypertension: not applicable  Diabetes: not applicable  Obstructive Sleep Apnea: not applicable  Hyperlipidemia: not applicable  Stress Urinary Incontinence: improved  Gastroesophageal Reflux: worsened, has resumed prilosec  Weight related arthropathy:improved, has bilateral knee pain     Patient Active Problem List   Diagnosis Code    [facility] E66.01    History of adjustable gastric banding Z98.84    Migraine G43.909    Chronic back pain M54.9, G89.29    AVELINO (stress urinary incontinence, female) N39.3    PCOS (polycystic ovarian syndrome) E28.2    Irregular menses N92.6    GERD (gastroesophageal reflux disease) K21.9    Arthritic-like pain M25.50    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 stricture of stomach     Irregular menses     due to PCOS    Migraine     Morbid obesity ([address]     Morbid obesity with BMI of 40.0-44.9, adult (HCC)     Nausea & vomiting     severe    PCOS (polycystic ovarian syndrome)     AVELINO (stress urinary incontinence, female)        Past Surgical History:   Procedure Laterality Date    HX GI  2011    plac[facility] gastric band in [facility] HX GI  2013    removal of gastric band in [place] (not original surgeon)    HX HEENT      mulitple tympanic membrane surgeries as a child    HX TONSIL AND ADENOIDECTOMY      HX TUBAL LIGATION         Current Outpatient Prescriptions   Medication Sig Dispense Refill    omeprazole (PRILOSEC) 20 mg capsule Take 20 mg by mouth daily.  ergocalciferol (VITAMIN D2) 50,000 unit capsule Take 1 Cap by mouth two (2) times a week for 52 doses. 52 Cap 1    CALCIUM CITRATE PO Take  by mouth.  cyanocobalamin (VITAMIN B-12) 1,000 mcg sublingual tablet Take 1,000 mcg by mouth daily.  BIOTIN PO Take  by mouth.  VITAMIN B COMPLEX PO Take  by mouth.  loratadine (CLARITIN) 10 mg tablet Take 10 mg by mouth.  L. RHAMNOSUS GG/INULIN (CULTURELLE PROBIOTICS PO) Take  by mouth.  multivitamin with iron (FLINTSTONES) chewable tablet Take 1 Tab by mouth two (2) times a day.          No Known Allergies    Review of Systems:  General - No history or complaints of unexpected fever or chills  Head/Neck - No history or complaints of headache or dizziness  Cardiac - No history or complaints of chest pain, palpitations, or shortness of breath  Pulmonary - No history or complaints of shortness of breath or productive cough  Gastrointestinal - as noted above  Genitourinary - No history or complaints of hematuria/dysuria or renal lithiasis  Musculoskeletal - No history or complaints of joint  muscular weakness  Hematologic - No history of any bleeding episodes  Neurologic - No history or complaints of  migraine headaches or neurologic symptoms    Objective:     Visit Vitals    /64 (BP 1 Location: Left arm, BP Patient Position: Sitting)    Pulse (!) 55    Ht 5' 2\" (1.575 m)    Wt 63.5 kg (139 lb 14.4 oz)    SpO2 100%    BMI 25.59 kg/m2       General:  alert, cooperative, no distress, appears stated age   Chest: lungs clear to auscultation, no rhonchi, rales or wheezes, no accessory muscle use   Cor:   Regular rate and rhythm or without murmur or extra heart sounds   Abdomen: soft, bowel sounds active, non-tender, no masses or organomegaly   Incisions:   healing well, no drainage, no erythema, no hernia, no seroma, no swelling, no dehiscence, incision well approximated       Assessment:   History of Morbid obesity, status post laparoscopic gastric bypass surgery. Doing well postoperatively. Plan:       1. Discussed patients weight loss goals and dietary choices in relation to goals. 2. Reminded to measure portions, continue high protein, low carbohydrate diet. Reminded to eat regularly, to eat slowly & not to drink with meals. 3. Continue vitamin supplementation  4. Continue current medications and follow up with PCP for management of regimen. 5. Continue cardio exercise and add resistance exercises. 60-90 minutes of aerobic activity 5 days a week and strength training 2 days each week. 6. Encouraged to attend support group   7. I have discussed this plan with patient and they verbalized understanding  8. Follow up in 6 months or sooner if patient has questions, concerns or worsening of condition, if unable to reach our office, patient should report to the ED. 9. Ms. Guevara Rolle has a reminder for a \"due or due soon\" health maintenance. I have asked that she contact her primary care provider for a follow-up on this health maintenance.

## 2018-04-03 NOTE — PATIENT INSTRUCTIONS
Patient Instructions      1. Remember hydration goals - minimum of 64 ounces of liquids per day (dehydration is the number one reason for hospital readmission). 2. Continue to monitor carbohydrate and protein intake you need a minimum of  Grams of protein daily- remember to keep your total carbohydrates to 50 grams or less per day for best results. 3. Continue to work towards exercise goals - 60-90 minutes, 5 times a week minimum of deliberate, aerobic exercise is the ultimate goal with strength training 2 times each week. Refer to [employer] for  information. 4. Remember to take vitamins as directed in your handbook. 5. Attend support group the 2nd Thursday of each month. 6. Constipation: Milk of Magnesia is for immediate relief. Miralax is to be used every day if constipation is a chronic problem. 7. Diarrhea: patients will occasionally develop lactose intolerance after surgery. Check to see if your protein shake has whey in it. If it does try one that does not have whey and stop all yogurts, cheeses and milks to see if the diarrhea goes away. 8. Call us at [phone] or email us through [email or web address] with questions,     concerns or worsening of condition, we have someone on call 24 hours a day. If you are unable to reach our office, you are to go to your Primary Care Physician or the Emergency Department. Supplement Resource Guide    Importance of Protein:   Maintains lean body mass, produces antibodies to fight off infections, heals wounds, minimizes hair loss, helps to give you energy, helps with satiety, and keeping you full between meals. Importance of Calcium:  Needed for healthy bones and teeth, normal blood clotting, and nervous system functioning, higher risk of osteoporosis and bone disease with non-compliance. Importance of Multivitamins: Many functions.   Supply you with extra nutrients that you may be missing from food. May lead to iron deficiency anemia, weakness, fatigue, and many other symptoms with non-compliance. Importance of B Vitamins:  Important for red blood cell formation, metabolism, energy, and helps to maintain a healthy nervous system. Protein Supplement  Find one you like now. Use immediately after surgery. Look for:  35-50g protein each day from your protein supplement once you reach the progression diet. 0-3 g fat per serving  0-3 g sugar per serving    Protein drinks should be split in separate dosages. Recommend: Lifelong  1 year + Calcium Supplement:     Start taking within a month after surgery. Look for: Calcium Citrate Plus D (1500 mg per day)  Recommend: Citracal     .            Avoid chocolate chewable calcium. Can use chewable bariatric or GNC brand or similar chewable. The body cannot absorb more than 500-600 mg of calcium at a time. Take for Life Multi-vitamin Supplement:      Start immediately after surgery: any complete chewable, such as: Miracles Complete chewables. Avoid Miracle sours or gummies. They lack iron and other important nutrients and also have added sugar. Continue with chewable vitamin or change to adult complete multivitamin one month after surgery. Menstruating women can take a prenatal vitamin. Make sure has at least 18 mg iron and 571-404 mcg folic acid   Vitamin H28, B Complex Vitamin, and Biotin  Start taking within a month after surgery. Vitamin B12:  1000 mcg of Vitamin B12 three times weekly    Must take sublingually (meaning you take it under your tongue) or in a liquid drop form for easy absorption. B Complex Vitamin: Take a pill or liquid drop form once daily. Biotin: This vitamin can help prevent hair loss.     Recommend 5mg   (5000 mcg) a day  Biotin is Optional

## 2018-04-03 NOTE — MR AVS SNAPSHOT
[address] [facility] 
 
 
 [address] [facility] [address] [facility] 
[phone] Patient: Sachin Alejo MRN: [id number] :1980 Visit Information Date & Time Provider Department Dept. Phone Encounter #  
 4/3/2018 11:30 AM Bossman Leal, [address] [facility] [phone] [id number] Follow-up Instructions Return in about 6 months (around 10/3/2018). Follow-up and Disposition History Upcoming Health Maintenance Date Due DTaP/Tdap/Td series (1 - Tdap) 1999 PAP AKA CERVICAL CYTOLOGY 1999 Influenza Age 5 to Adult 2017 Allergies as of 4/3/2018  Review Complete On: 4/3/2018 By: JOSSE Ragsdale No Known Allergies Current Immunizations  Reviewed on 2017 No immunizations on file. Not reviewed this visit You Were Diagnosed With   
  
 Codes Comments Intestinal malabsorption, unspecified type    -  Primary ICD-10-CM: K90.9 ICD-9-CM: 579.9 S/P bariatric surgery     ICD-10-CM: Z98.84 ICD-9-CM: V45.86 Vitals BP Pulse Height(growth percentile) Weight(growth percentile) SpO2 BMI  
 100/64 (BP 1 Location: Left arm, BP Patient Position: Sitting) (!) 55 5' 2\" (1.575 m) 139 lb 14.4 oz (63.5 kg) 100% 25.59 kg/m2 OB Status Smoking Status Having regular periods Never Smoker BMI and BSA Data Body Mass Index Body Surface Area 25.59 kg/m 2 1.67 m 2 Preferred Pharmacy Pharmacy Name Phone [address], Susanne Hackett [phone] Your Updated Medication List  
  
   
This list is accurate as of 4/3/18 12:04 PM.  Always use your most recent med list.  
  
  
  
  
 BIOTIN PO Take  by mouth. CALCIUM CITRATE PO Take  by mouth. CLARITIN 10 mg tablet Generic drug:  loratadine Take 10 mg by mouth. CULTURELLE PROBIOTICS PO Take  by mouth. ergocalciferol 50,000 unit capsule Commonly known as:  VITAMIN D2 Take 1 Cap by mouth two (2) times a week for 52 doses. multivitamin with iron chewable tablet Commonly known as:  Clarice Reading Take 1 Tab by mouth two (2) times a day. omeprazole 20 mg capsule Commonly known as:  PRILOSEC Take 20 mg by mouth daily. VITAMIN B COMPLEX PO Take  by mouth. VITAMIN B-12 1,000 mcg sublingual tablet Generic drug:  cyanocobalamin Take 1,000 mcg by mouth daily. Follow-up Instructions Return in about 6 months (around 10/3/2018). Patient Instructions Patient Instructions 1. Remember hydration goals - minimum of 64 ounces of liquids per day (dehydration is the number one reason for hospital readmission). 2. Continue to monitor carbohydrate and protein intake you need a minimum of  Grams of protein daily- remember to keep your total carbohydrates to 50 grams or less per day for best results. 3. Continue to work towards exercise goals - 60-90 minutes, 5 times a week minimum of deliberate, aerobic exercise is the ultimate goal with strength training 2 times each week. Refer to [employer] for  information. 4. Remember to take vitamins as directed in your handbook. 5. Attend support group the 2nd Thursday of each month. 6. Constipation: Milk of Magnesia is for immediate relief. Miralax is to be used every day if constipation is a chronic problem. 7. Diarrhea: patients will occasionally develop lactose intolerance after surgery. Check to see if your protein shake has whey in it. If it does try one that does not have whey and stop all yogurts, cheeses and milks to see if the diarrhea goes away.    
8. Call us at [phone] or email us through [email or web address]" with questions,     concerns or worsening of condition, we have someone on call 24 hours a day.  If you are unable to reach our office, you are to go to your Primary Care Physician or the Emergency Department. Supplement Resource Guide Importance of Protein:  
Maintains lean body mass, produces antibodies to fight off infections, heals wounds, minimizes hair loss, helps to give you energy, helps with satiety, and keeping you full between meals. Importance of Calcium: 
Needed for healthy bones and teeth, normal blood clotting, and nervous system functioning, higher risk of osteoporosis and bone disease with non-compliance. Importance of Multivitamins: Many functions. Supply you with extra nutrients that you may be missing from food. May lead to iron deficiency anemia, weakness, fatigue, and many other symptoms with non-compliance. Importance of B Vitamins: 
Important for red blood cell formation, metabolism, energy, and helps to maintain a healthy nervous system. Protein Supplement Find one you like now. Use immediately after surgery. Look for: 
35-50g protein each day from your protein supplement once you reach the progression diet. 0-3 g fat per serving 0-3 g sugar per serving Protein drinks should be split in separate dosages. Recommend: Lifelong 1 year + Calcium Supplement:  
 
Start taking within a month after surgery. Look for: Calcium Citrate Plus D (1500 mg per day) Recommend: Citracal 
 
 . Avoid chocolate chewable calcium. Can use chewable bariatric or GNC brand or similar chewable. The body cannot absorb more than 500-600 mg of calcium at a time. Take for Life Multi-vitamin Supplement:   
 
Start immediately after surgery: any complete chewable, such as: [place]s Complete chewables. Avoid [place] sours or gummies. They lack iron and other important nutrients and also have added sugar.  
 
Continue with chewable vitamin or change to adult complete multivitamin one month after surgery. Menstruating women can take a prenatal vitamin. Make sure has at least 18 mg iron and 420-733 mcg folic acid Vitamin B12, B Complex Vitamin, and Biotin Start taking within a month after surgery. Vitamin B12:  1000 mcg of Vitamin B12 three times weekly Must take sublingually (meaning you take it under your tongue) or in a liquid drop form for easy absorption. B Complex Vitamin: Take a pill or liquid drop form once daily. Biotin: This vitamin can help prevent hair loss. Recommend 5mg  
(5000 mcg) a day Biotin is Optional  
 
 
 
 
 
 
  
Introducing [facility]! Dear Pepe Acosta: 
Thank you for requesting a [employer] account. Our records indicate that you already have an active [employer] account. You can access your account anytime at [email or web address] Did you know that you can access your hospital and ER discharge instructions at any time in [employer]? You can also review all of your test results from your hospital stay or ER visit. Additional Information If you have questions, please visit the Frequently Asked Questions section of the [employer] website at [email or web address] Remember, [employer] is NOT to be used for urgent needs. For medical emergencies, dial 911. Now available from your iPhone and Android! Please provide this summary of care documentation to your next provider. Your primary care clinician is listed as Lisy Cantrell. If you have any questions after today's visit, please call [phone].

## 2018-08-06 ENCOUNTER — CLINICAL SUPPORT (OUTPATIENT)
Dept: SURGERY | Age: 38
End: 2018-08-06

## 2018-08-06 VITALS — WEIGHT: 140 LBS | BODY MASS INDEX: 25.76 KG/M2 | HEIGHT: 62 IN

## 2018-08-06 DIAGNOSIS — Z98.84 S/P BARIATRIC SURGERY: Primary | ICD-10-CM

## 2018-08-06 NOTE — PROGRESS NOTES
Jacklynn Litten is a 42 y.o. female who is 1.5 years  s/p lap GBP. The patient has lost 90 lbs since surgery. Body mass index is 25.61 kg/(m^2). The patient has lost 86% EBW. Vitamins: all recommended bariatric vitamins plus a daily probiotic       Diet History:  Breakfast  What are you eating and how much? Either a yogurt with chopped almonds or ONE protein bar    When? ii   Where? iii   Snacks  What are you eating and how much? Premier shake    When? ii   Where? iii   Hydration  What are you eating and how much? water   When? ii   Where? ii   Lunch  What are you eating and how much? Pepperoni and melted cheese, salad, ham and melted cheese    When? ii   Where? iii   Snacks  What are you eating and how much? Peanut butter nabs or crackers with cheese   When? ii   Where? iii   Hydration  What are you eating and how much? Premier clear (with her workout or before her workout)    When? ii   Where? iii   Dinner  What are you eating and how much? Protein and a vegetable - most of the time if she does the nabs or the cheese and crackers - sometimes she'll have some cheezits    When? ii   Where? iii   Snacks  What are you eating and how much? Sometimes cheezits    When? ii   Where? iii   Hydration  What are you eating and how much? Water    When? ii   Where? iii   Hydration:   Calories:  Protein:  Carbohydrates:   Exercise:  Do you currently have an exercise routine? yes  What kind of exercise do you do? patient goes to the gym . For how long?   minutes For how often? 5 days a week    Goals:     F/u:

## 2018-10-03 ENCOUNTER — HOSPITAL ENCOUNTER (OUTPATIENT)
Dept: LAB | Age: 38
Discharge: HOME | End: 2018-10-03
Payer: COMMERCIAL

## 2018-10-03 ENCOUNTER — OFFICE VISIT (OUTPATIENT)
Dept: SURGERY | Age: 38
End: 2018-10-03

## 2018-10-03 VITALS
RESPIRATION RATE: 16 BRPM | HEIGHT: 62 IN | TEMPERATURE: 98.2 F | BODY MASS INDEX: 25.03 KG/M2 | DIASTOLIC BLOOD PRESSURE: 51 MMHG | HEART RATE: 68 BPM | SYSTOLIC BLOOD PRESSURE: 94 MMHG | WEIGHT: 136 LBS | OXYGEN SATURATION: 99 %

## 2018-10-03 DIAGNOSIS — Z98.84 S/P BARIATRIC SURGERY: ICD-10-CM

## 2018-10-03 DIAGNOSIS — K90.9 INTESTINAL MALABSORPTION, UNSPECIFIED TYPE: Primary | ICD-10-CM

## 2018-10-03 LAB
25(OH)D3 SERPL-MCNC: 80.8 NG/ML (ref 30–100)
ALBUMIN SERPL-MCNC: 4.1 G/DL (ref 3.4–5)
ALBUMIN/GLOB SERPL: 1.3 {RATIO} (ref 0.8–1.7)
ALP SERPL-CCNC: 47 U/L (ref 45–117)
ALT SERPL-CCNC: 51 U/L (ref 13–56)
ANION GAP SERPL CALC-SCNC: 8 MMOL/L (ref 3–18)
AST SERPL-CCNC: 28 U/L (ref 15–37)
BASOPHILS # BLD: 0 K/UL (ref 0–0.1)
BASOPHILS NFR BLD: 0 % (ref 0–2)
BILIRUB SERPL-MCNC: 0.5 MG/DL (ref 0.2–1)
BUN SERPL-MCNC: 24 MG/DL (ref 7–18)
BUN/CREAT SERPL: 31 (ref 12–20)
CALCIUM SERPL-MCNC: 8.5 MG/DL (ref 8.5–10.1)
CHLORIDE SERPL-SCNC: 102 MMOL/L (ref 100–108)
CO2 SERPL-SCNC: 30 MMOL/L (ref 21–32)
CREAT SERPL-MCNC: 0.78 MG/DL (ref 0.6–1.3)
DIFFERENTIAL METHOD BLD: ABNORMAL
EOSINOPHIL # BLD: 0 K/UL (ref 0–0.4)
EOSINOPHIL NFR BLD: 0 % (ref 0–5)
ERYTHROCYTE [DISTWIDTH] IN BLOOD BY AUTOMATED COUNT: 13.5 % (ref 11.6–14.5)
GLOBULIN SER CALC-MCNC: 3.2 G/DL (ref 2–4)
GLUCOSE SERPL-MCNC: 85 MG/DL (ref 74–99)
HCT VFR BLD AUTO: 35.6 % (ref 35–45)
HGB BLD-MCNC: 11.7 G/DL (ref 12–16)
LYMPHOCYTES # BLD: 2.1 K/UL (ref 0.9–3.6)
LYMPHOCYTES NFR BLD: 43 % (ref 21–52)
MCH RBC QN AUTO: 29.5 PG (ref 24–34)
MCHC RBC AUTO-ENTMCNC: 32.9 G/DL (ref 31–37)
MCV RBC AUTO: 89.9 FL (ref 74–97)
MONOCYTES # BLD: 0.4 K/UL (ref 0.05–1.2)
MONOCYTES NFR BLD: 8 % (ref 3–10)
NEUTS SEG # BLD: 2.3 K/UL (ref 1.8–8)
NEUTS SEG NFR BLD: 49 % (ref 40–73)
PLATELET # BLD AUTO: 178 K/UL (ref 135–420)
PMV BLD AUTO: 10.5 FL (ref 9.2–11.8)
POTASSIUM SERPL-SCNC: 4.1 MMOL/L (ref 3.5–5.5)
PROT SERPL-MCNC: 7.3 G/DL (ref 6.4–8.2)
RBC # BLD AUTO: 3.96 M/UL (ref 4.2–5.3)
SODIUM SERPL-SCNC: 140 MMOL/L (ref 136–145)
WBC # BLD AUTO: 4.8 K/UL (ref 4.6–13.2)

## 2018-10-03 PROCEDURE — 84425 ASSAY OF VITAMIN B-1: CPT | Performed by: SPECIALIST

## 2018-10-03 PROCEDURE — 36415 COLL VENOUS BLD VENIPUNCTURE: CPT | Performed by: SPECIALIST

## 2018-10-03 PROCEDURE — 85025 COMPLETE CBC W/AUTO DIFF WBC: CPT | Performed by: SPECIALIST

## 2018-10-03 PROCEDURE — 82306 VITAMIN D 25 HYDROXY: CPT | Performed by: SPECIALIST

## 2018-10-03 PROCEDURE — 82607 VITAMIN B-12: CPT | Performed by: SPECIALIST

## 2018-10-03 PROCEDURE — 83540 ASSAY OF IRON: CPT | Performed by: SPECIALIST

## 2018-10-03 PROCEDURE — 82728 ASSAY OF FERRITIN: CPT | Performed by: SPECIALIST

## 2018-10-03 PROCEDURE — 80053 COMPREHEN METABOLIC PANEL: CPT | Performed by: SPECIALIST

## 2018-10-03 RX ORDER — DIAPER,BRIEF,ADULT, DISPOSABLE
EACH MISCELLANEOUS
COMMUNITY

## 2018-10-03 NOTE — PATIENT INSTRUCTIONS
Patient Instructions      1. Remember hydration goals - minimum of 64 ounces of liquids per day (dehydration is the number one reason for hospital readmission). 2. Continue to monitor carbohydrate and protein intake you need a minimum of  Grams of protein daily- remember to keep your total carbohydrates to 50 grams or less per day for best results. 3. Continue to work towards exercise goals - 60-90 minutes, 5 times a week minimum of deliberate, aerobic exercise is the ultimate goal with strength training 2 times each week. Refer to [employer] for  information. 4. Remember to take vitamins as directed in your handbook. 5. Attend support group the 2nd Thursday of each month. 6. Constipation: Milk of Magnesia is for immediate relief. Miralax is to be used every day if constipation is a chronic problem. 7. Diarrhea: patients will occasionally develop lactose intolerance after surgery. Check to see if your protein shake has whey in it. If it does try one that does not have whey and stop all yogurts, cheeses and milks to see if the diarrhea goes away. 8. Call us at [phone] or email us through [email or web address]" with questions,     concerns or worsening of condition, we have someone on call 24 hours a day. If you are unable to reach our office, you are to go to your Primary Care Physician or the Emergency Department. Supplement Resource Guide    Importance of Protein:   Maintains lean body mass, produces antibodies to fight off infections, heals wounds, minimizes hair loss, helps to give you energy, helps with satiety, and keeping you full between meals. Importance of Calcium:  Needed for healthy bones and teeth, normal blood clotting, and nervous system functioning, higher risk of osteoporosis and bone disease with non-compliance. Importance of Multivitamins: Many functions.   Supply you with extra nutrients that you may be missing from food. May lead to iron deficiency anemia, weakness, fatigue, and many other symptoms with non-compliance. Importance of B Vitamins:  Important for red blood cell formation, metabolism, energy, and helps to maintain a healthy nervous system. Protein Supplement  Find one you like now. Use immediately after surgery. Look for:  35-50g protein each day from your protein supplement once you reach the progression diet. 0-3 g fat per serving  0-3 g sugar per serving    Protein drinks should be split in separate dosages. Recommend: Lifelong  1 year + Calcium Supplement:     Start taking within a month after surgery. Look for: Calcium Citrate Plus D (1500 mg per day)  Recommend: Citracal     .            Avoid chocolate chewable calcium. Can use chewable bariatric or [employer] brand or similar chewable. The body cannot absorb more than 500-600 mg of calcium at a time. Take for Life Multi-vitamin Supplement:      Start immediately after surgery: any complete chewable, such as: [place] Complete chewables. Avoid [place] sours or gummies. They lack iron and other important nutrients and also have added sugar. Continue with chewable vitamin or change to adult complete multivitamin one month after surgery. Menstruating women can take a prenatal vitamin. Make sure has at least 18 mg iron and 040-630 mcg folic acid   Vitamin E46, B Complex Vitamin, and Biotin  Start taking within a month after surgery. Vitamin B12:  1000 mcg of Vitamin B12 three times weekly    Must take sublingually (meaning you take it under your tongue) or in a liquid drop form for easy absorption. B Complex Vitamin: Take a pill or liquid drop form once daily. Biotin: This vitamin can help prevent hair loss.     Recommend 5mg   (5000 mcg) a day  Biotin is Optional

## 2018-10-03 NOTE — MR AVS SNAPSHOT
[address] [id number] [facility] 
[phone] Patient: Maria Luisa Garcia MRN: [id number] :1980 Visit Information Date & Time Provider Department Dept. Phone Encounter #  
 10/3/2018  4:00 PM Jack Scott, [address] [facility] [place] [phone] [id number] Follow-up Instructions Return in about 3 months (around 1/3/2019). Upcoming Health Maintenance Date Due DTaP/Tdap/Td series (1 - Tdap) 1999 PAP AKA CERVICAL CYTOLOGY 1999 Influenza Age 5 to Adult 2018 Allergies as of 10/3/2018  Review Complete On: 10/3/2018 By: Filipe Bryson LPN No Known Allergies Current Immunizations  Reviewed on 2017 No immunizations on file. Not reviewed this visit You Were Diagnosed With   
  
 Codes Comments Intestinal malabsorption, unspecified type    -  Primary ICD-10-CM: K90.9 ICD-9-CM: 579.9 S/P bariatric surgery     ICD-10-CM: Z98.84 ICD-9-CM: V45.86 Vitals BP Pulse Temp Height(growth percentile) Weight(growth percentile) SpO2  
 94/51 (BP 1 Location: Left arm, BP Patient Position: Sitting) 68 98.2 °F (36.8 °C) (Temporal) 5' 2\" (1.575 m) 136 lb (61.7 kg) 99% BMI OB Status Smoking Status 24.87 kg/m2 Having regular periods Never Smoker Vitals History BMI and BSA Data Body Mass Index Body Surface Area  
 24.87 kg/m 2 1.64 m 2 Preferred Pharmacy Pharmacy Name Phone [address], Susanne Hackett [phone] Your Updated Medication List  
  
   
This list is accurate as of 10/3/18  4:30 PM.  Always use your most recent med list.  
  
  
  
  
 BIOTIN PO Take  by mouth. CALCIUM CITRATE PO Take  by mouth. CLARITIN 10 mg tablet Generic drug:  loratadine Take 10 mg by mouth. CULTURELLE PROBIOTICS PO Take  by mouth. L-LYSINE 500 mg Tab tablet Generic drug:  lysine Take  by mouth three (3) times daily (with meals). multivitamin with iron chewable tablet Commonly known as:  [place] Saver Take 1 Tab by mouth two (2) times a day. omeprazole 20 mg capsule Commonly known as:  PRILOSEC Take 20 mg by mouth daily. VITAMIN B COMPLEX PO Take  by mouth. VITAMIN B-12 1,000 mcg sublingual tablet Generic drug:  cyanocobalamin Take 1,000 mcg by mouth daily. Follow-up Instructions Return in about 3 months (around 1/3/2019). To-Do List   
 10/03/2018 Lab:  CBC WITH AUTOMATED DIFF   
  
 10/03/2018 Lab:  FERRITIN   
  
 10/03/2018 Lab:  IRON   
  
 10/03/2018 Lab:  METABOLIC PANEL, COMPREHENSIVE   
  
 10/03/2018 Lab:  VITAMIN B1, WHOLE BLOOD   
  
 10/03/2018 Lab:  VITAMIN B12 & FOLATE   
  
 10/03/2018 Lab:  VITAMIN D, 25 HYDROXY Patient Instructions Patient Instructions 1. Remember hydration goals - minimum of 64 ounces of liquids per day (dehydration is the number one reason for hospital readmission). 2. Continue to monitor carbohydrate and protein intake you need a minimum of  Grams of protein daily- remember to keep your total carbohydrates to 50 grams or less per day for best results. 3. Continue to work towards exercise goals - 60-90 minutes, 5 times a week minimum of deliberate, aerobic exercise is the ultimate goal with strength training 2 times each week. Refer to [employer] for  information. 4. Remember to take vitamins as directed in your handbook. 5. Attend support group the 2nd Thursday of each month. 6. Constipation: Milk of Magnesia is for immediate relief. Miralax is to be used every day if constipation is a chronic problem.    
7. Diarrhea: patients will occasionally develop lactose intolerance after surgery. Check to see if your protein shake has whey in it. If it does try one that does not have whey and stop all yogurts, cheeses and milks to see if the diarrhea goes away. 8. Call us at [phone] or email us through [facility] with questions,     concerns or worsening of condition, we have someone on call 24 hours a day. If you are unable to reach our office, you are to go to your Primary Care Physician or the Emergency Department. Supplement Resource Guide Importance of Protein:  
Maintains lean body mass, produces antibodies to fight off infections, heals wounds, minimizes hair loss, helps to give you energy, helps with satiety, and keeping you full between meals. Importance of Calcium: 
Needed for healthy bones and teeth, normal blood clotting, and nervous system functioning, higher risk of osteoporosis and bone disease with non-compliance. Importance of Multivitamins: Many functions. Supply you with extra nutrients that you may be missing from food. May lead to iron deficiency anemia, weakness, fatigue, and many other symptoms with non-compliance. Importance of B Vitamins: 
Important for red blood cell formation, metabolism, energy, and helps to maintain a healthy nervous system. Protein Supplement Find one you like now. Use immediately after surgery. Look for: 
35-50g protein each day from your protein supplement once you reach the progression diet. 0-3 g fat per serving 0-3 g sugar per serving Protein drinks should be split in separate dosages. Recommend: Lifelong 1 year + Calcium Supplement:  
 
Start taking within a month after surgery. Look for: Calcium Citrate Plus D (1500 mg per day) Recommend: Citracal 
 
 . Avoid chocolate chewable calcium. Can use chewable bariatric or [employer]NC brand or similar chewable. The body cannot absorb more than 500-600 mg of calcium at a time. Take for Life Multi-vitamin Supplement: Start immediately after surgery: any complete chewable, such as: [place]s Complete chewables. Avoid [place] sours or gummies. They lack iron and other important nutrients and also have added sugar. Continue with chewable vitamin or change to adult complete multivitamin one month after surgery. Menstruating women can take a prenatal vitamin. Make sure has at least 18 mg iron and 834-785 mcg folic acid Vitamin B12, B Complex Vitamin, and Biotin Start taking within a month after surgery. Vitamin B12:  1000 mcg of Vitamin B12 three times weekly Must take sublingually (meaning you take it under your tongue) or in a liquid drop form for easy absorption. B Complex Vitamin: Take a pill or liquid drop form once daily. Biotin: This vitamin can help prevent hair loss. Recommend 5mg  
(5000 mcg) a day Biotin is Optional  
 
 
 
 
 
 
  
Introducing [facility] SERVICES! Dear Karlos Oh: 
Thank you for requesting a [employer] account. Our records indicate that you already have an active [employer] account. You can access your account anytime at [email or web address] Did you know that you can access your hospital and ER discharge instructions at any time in [employer]? You can also review all of your test results from your hospital stay or ER visit. Additional Information If you have questions, please visit the Frequently Asked Questions section of the [employer] website at [email or web address] Remember, [employer] is NOT to be used for urgent needs. For medical emergencies, dial 911. Now available from your iPhone and Android! Please provide this summary of care documentation to your next provider. Your primary care clinician is listed as [facility]. If you have any questions after today's visit, please call [phone].

## 2018-10-03 NOTE — PROGRESS NOTES
Subjective:      Gil Collet is a 42 y.o. female is now 18 months status post laparoscopic gastric bypass surgery. Doing well overall. She has lost a total of 94 pounds since surgery. Body mass index is 24.87 kg/(m^2). Has lost 89% of EBW. Currently on a solid food diet without difficulty, reports no issues and denies vomiting and abdominal pain. Taking in 70oz water daily. Sources of protein include protein shakes, one protein bars, chicken, ground beef, pork, nuts, yogurt, peanut butter. 45-55 min of activity 4 days a week, including cardio on the elliptical.  2-3 days a week she does weight training. Sleeps 6.5 - 7 hours each night. Bowel movements are regular. The patient is not having any pain. . The patient is compliant with multivitamins, calcium, Vit D and B12 supplements.      Weight Loss Metrics 10/3/2018 8/6/2018 4/3/2018 1/26/2018 10/12/2017 7/17/2017 5/18/2017   Today's Wt 136 lb 140 lb 139 lb 14.4 oz 140 lb 8 oz 157 lb 180 lb 200 lb 6.4 oz   BMI 24.87 kg/m2 25.61 kg/m2 25.59 kg/m2 25.7 kg/m2 28.72 kg/m2 32.92 kg/m2 36.65 kg/m2          Comorbidities:    Hypertension: not applicable  Diabetes: not applicable  Obstructive Sleep Apnea: not applicable  Hyperlipidemia: not applicable  Stress Urinary Incontinence: unchanged  Gastroesophageal Reflux: improved, uses TUMS  Weight related arthropathy:improved, bilateral knee pain     Patient Active Problem List   Diagnosis Code    [facility] E[facility]    History of adjustable gastric banding Z98.84    Migraine G43.909    Chronic back pain M54.9, G89.29    AVELINO (stress urinary incontinence, female) N39.3    PCOS (polycystic ovarian syndrome) E28.2    Irregular menses N92.6    GERD (gastroesophageal reflux disease) K21.9    Arthritic-like pain M25.50    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 stricture of stomach     Irregular menses     due to PCOS    Migraine     Morbid obesity ([address]     Morbid obesity with BMI of 40.0-44.9, adult (HCC)     Nausea & vomiting     severe    PCOS (polycystic ovarian syndrome)     AVELINO (stress urinary incontinence, female)        Past Surgical History:   Procedure Laterality Date    HX GI  2011    placment of gastric band in [facility] HX GI  2013    removal of gastric band in [place] (not original surgeon)    HX HEENT      mulitple tympanic membrane surgeries as a child    HX TONSIL AND ADENOIDECTOMY      HX TUBAL LIGATION         Current Outpatient Prescriptions   Medication Sig Dispense Refill    lysine (L-LYSINE) 500 mg tab tablet Take  by mouth three (3) times daily (with meals).  omeprazole (PRILOSEC) 20 mg capsule Take 20 mg by mouth daily.  CALCIUM CITRATE PO Take  by mouth.  cyanocobalamin (VITAMIN B-12) 1,000 mcg sublingual tablet Take 1,000 mcg by mouth daily.  BIOTIN PO Take  by mouth.  VITAMIN B COMPLEX PO Take  by mouth.  loratadine (CLARITIN) 10 mg tablet Take 10 mg by mouth.  L. RHAMNOSUS GG/INULIN (CULTURELLE PROBIOTICS PO) Take  by mouth.  multivitamin with iron (FLINTSTONES) chewable tablet Take 1 Tab by mouth two (2) times a day.          No Known Allergies    Review of Systems:  General - No history or complaints of unexpected fever or chills  Head/Neck - No history or complaints of headache or dizziness  Cardiac - No history or complaints of chest pain, palpitations, or shortness of breath  Pulmonary - No history or complaints of shortness of breath or productive cough  Gastrointestinal - as noted above  Genitourinary - No history or complaints of hematuria/dysuria or renal lithiasis  Musculoskeletal - No history or complaints of joint  muscular weakness  Hematologic - No history of any bleeding episodes  Neurologic - No history or complaints of  migraine headaches or neurologic symptoms    Objective:     Visit Vitals    BP 94/51 (BP 1 Location: Left arm, BP Patient Position: Sitting)    Pulse 68    Temp 98.2 °F (36.8 °C) (Temporal)    Ht 5' 2\" (1.575 m)    Wt 61.7 kg (136 lb)    SpO2 99%    BMI 24.87 kg/m2       General:  alert, cooperative, no distress, appears stated age   Chest: lungs clear to auscultation, breath sounds equal and symmetric, no rhonchi, rales or wheezes, no accessory muscle use   Cor:   Regular rate and rhythm or without murmur or extra heart sounds   Abdomen: soft, bowel sounds active, non-tender, no masses or organomegaly   Incisions:   healing well, no drainage, no erythema, no hernia, no seroma, no swelling, no dehiscence, incision well approximated       Assessment:   History of Morbid obesity, status post laparoscopic gastric bypass surgery. Doing well postoperatively. GERD - PPI  AVELINO    Plan:       1. Discussed patients weight loss goals and dietary choices in relation to goals. 2. Reminded to measure portions, continue high protein, low carbohydrate diet. Reminded to eat regularly, to eat slowly & not to drink with meals. 3. Continue vitamin supplementation  4. Continue current medications and follow up with PCP for management of regimen. 5. Continue cardio exercise and add resistance exercises. 60-90 minutes of aerobic activity 5 days a week and strength training 2 days each week. 6. Encouraged to attend support group   7. Patient to complete labs before next visit. Lab slip given today. 8. I have discussed this plan with patient and they verbalized understanding  9.  Follow up in 3 months or sooner if patient has questions, concerns or worsening of condition, if unable to reach our office, patient should report to the ED. 8. Ms. Natalie Patel has a reminder for a \"due or due soon\" health maintenance. I have asked that she contact her primary care provider for a follow-up on this health maintenance.

## 2018-10-04 LAB
FERRITIN SERPL-MCNC: 46 NG/ML (ref 8–388)
FOLATE SERPL-MCNC: >20 NG/ML (ref 3.1–17.5)
IRON SERPL-MCNC: 82 UG/DL (ref 50–175)
VIT B12 SERPL-MCNC: >2000 PG/ML (ref 211–911)

## 2018-10-06 LAB — VIT B1 BLD-SCNC: 167.2 NMOL/L (ref 66.5–200)

## 2019-05-08 ENCOUNTER — OFFICE VISIT (OUTPATIENT)
Dept: SURGERY | Age: 39
End: 2019-05-08

## 2019-05-08 VITALS
DIASTOLIC BLOOD PRESSURE: 65 MMHG | OXYGEN SATURATION: 100 % | HEIGHT: 62 IN | TEMPERATURE: 97.6 F | SYSTOLIC BLOOD PRESSURE: 113 MMHG | BODY MASS INDEX: 26.92 KG/M2 | HEART RATE: 60 BPM | WEIGHT: 146.3 LBS

## 2019-05-08 DIAGNOSIS — K90.9 INTESTINAL MALABSORPTION, UNSPECIFIED TYPE: Primary | ICD-10-CM

## 2019-05-08 DIAGNOSIS — Z98.84 S/P BARIATRIC SURGERY: ICD-10-CM

## 2019-05-08 RX ORDER — MELATONIN
DAILY
COMMUNITY
End: 2022-05-09

## 2019-05-08 NOTE — PROGRESS NOTES
Subjective:  
  
Meri Crump is a 34 y.o. female is now 2 years status post laparoscopic gastric bypass surgery. Doing well overall. She has lost a total of 84 pounds since surgery. Body mass index is 26.76 kg/m². Has lost 80% of EBW. Currently on a solid food diet without difficulty, reports no issues and denies vomiting and abdominal pain. Taking in 50-60oz water daily. Sources of protein include yogurt, chicken and cheese. 45-60 min of activity 4-5 days a week, including cardio and weights. Patient is sleeping 6  hours a night on average. Bowel movements are regular. The patient is not having any pain. . The patient is compliant with multivitamins, calcium, Vit D and B12 supplements. Weight Loss Metrics 5/8/2019 10/3/2018 8/6/2018 4/3/2018 1/26/2018 10/12/2017 7/17/2017 Today's Wt 146 lb 4.8 oz 136 lb 140 lb 139 lb 14.4 oz 140 lb 8 oz 157 lb 180 lb BMI 26.76 kg/m2 24.87 kg/m2 25.61 kg/m2 25.59 kg/m2 25.7 kg/m2 28.72 kg/m2 32.92 kg/m2 Comorbidities: Hypertension: not applicable Diabetes: not applicable Obstructive Sleep Apnea: not applicable Hyperlipidemia: not applicable Stress Urinary Incontinence: improved Gastroesophageal Reflux: worsened, has resumed prilosec Weight related arthropathy:improved, has bilateral knee pain Patient Active Problem List  
Diagnosis Code  [facility] E66.01  
 History of adjustable gastric banding Z98.84  
 Migraine G43.909  Chronic back pain M54.9, G89.29  
 AVELINO (stress urinary incontinence, female) N39.3  PCOS (polycystic ovarian syndrome) E28.2  Irregular menses N92.6  GERD (gastroesophageal reflux disease) K21.9  Arthritic-like pain M25.50  
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  
Diagnosis Date  Arthritic-like pain  Arthritis   
 osteo  Chronic back pain  GERD (gastroesophageal reflux disease)  History of adjustable gastric banding   
 placed 2011 / removed 2013 all in [place]  Hourglass stricture of stomach  Irregular menses   
 due to PCOS  Migraine  [facility]bid [facility] ([facility]  Morbid obesity with BMI of 40.0-44.9, adult ([facility]  Nausea & vomiting   
 severe  PCOS (polycystic ovarian syndrome)  AVELINO (stress urinary incontinence, female) Past Surgical History:  
Procedure Laterality Date  HX GI  2011  
 placment of gastric band in [place]  HX GI  2013  
 removal of gastric band in [place] (not original surgeon)  HX HEENT    
 mulitple tympanic membrane surgeries as a child  HX TONSIL AND ADENOIDECTOMY  HX TUBAL LIGATION Current Outpatient Medications Medication Sig Dispense Refill  cholecalciferol (VITAMIN D3) 1,000 unit tablet Take  by mouth daily.  lysine (L-LYSINE) 500 mg tab tablet Take  by mouth three (3) times daily (with meals).  omeprazole (PRILOSEC) 20 mg capsule Take 20 mg by mouth daily.  CALCIUM CITRATE PO Take  by mouth.  cyanocobalamin (VITAMIN B-12) 1,000 mcg sublingual tablet Take 1,000 mcg by mouth daily.  BIOTIN PO Take  by mouth.  VITAMIN B COMPLEX PO Take  by mouth.  loratadine (CLARITIN) 10 mg tablet Take 10 mg by mouth.  L. RHAMNOSUS GG/INULIN (CULTURELLE PROBIOTICS PO) Take  by mouth.  multivitamin with iron (FLINTSTONES) chewable tablet Take 1 Tab by mouth two (2) times a day. No Known Allergies Review of Systems: 
General - No history or complaints of unexpected fever or chills Head/Neck - No history or complaints of headache or dizziness Cardiac - No history or complaints of chest pain, palpitations, or shortness of breath Pulmonary - No history or complaints of shortness of breath or productive cough Gastrointestinal - as noted above Genitourinary - No history or complaints of hematuria/dysuria or renal lithiasis Musculoskeletal - No history or complaints of joint  muscular weakness Hematologic - No history of any bleeding episodes Neurologic - No history or complaints of  migraine headaches or neurologic symptoms Objective:  
 
Visit Vitals /65 (BP 1 Location: Left arm, BP Patient Position: Sitting) Pulse 60 Temp 97.6 °F (36.4 °C) Ht 5' 2\" (1.575 m) Wt 66.4 kg (146 lb 4.8 oz) SpO2 100% BMI 26.76 kg/m² General:  alert, cooperative, no distress, appears stated age Chest: lungs clear to auscultation, breath sounds equal and symmetric, no rhonchi, rales or wheezes, no accessory muscle use Cor:   Regular rate and rhythm or without murmur or extra heart sounds Abdomen: soft, bowel sounds active, non-tender, no masses or organomegaly Incisions:   healed well Assessment:  
History of Morbid obesity, status post laparoscopic gastric bypass surgery. Doing well postoperatively. Sleep goal is 7-9 hours each night. Patient education given on the effects of sleep deprivation on weight control. Increase fluid intake to >64oz daily or more of sugar free and caffeine free fluids. Strict diet control, patient is to keep carbohydrate consumption <50g daily for additional weight loss. Reminded dietitian is always a free resource for her. I gave her lots of options for replacing the foods that she has been eating that have been not on her diet plan, such as Whisps, baking with protein powder, Moon Cheese, etc.   
I recommended that she read [employer] Exercise a minimum of 30 minutes daily. AVELINO 
GERD - continue PPI Plan:  
 
1. Increase activity to the goal of 30 minutes daily 2. Discussed patients weight loss goals and dietary choices in relation to goals. 3. Sleep goal is 7-9 hours each night. Patient education given on the effects of sleep deprivation on weight control. 4. Reminded to measure portions, continue high protein, low carbohydrate diet. Reminded to eat regularly, to eat slowly & not to drink with meals. 5. Continue vitamin supplementation 6. Continue current medications and follow up with PCP for management of regimen. 7. Continue cardio exercise and add resistance exercises. 60-90 minutes of aerobic activity 5 days a week and strength training 2 days each week. 8. Encouraged to attend support group 9. I have discussed this plan with patient and they verbalized understanding 10. Follow up in 1 year or sooner if patient has questions, concerns or worsening of condition, if unable to reach our office, patient should report to the ED. 6. Ms. Bryce Culp has a reminder for a \"due or due soon\" health maintenance. I have asked that she contact her primary care provider for a follow-up on this health maintenance.

## 2019-05-08 NOTE — PATIENT INSTRUCTIONS
Patient Instructions 1. Remember hydration goals - minimum of 64 ounces of liquids per day (dehydration is the number one reason for hospital readmission). 2. Sleep 7-9 hours each night to keep your metabolism up. 3. Continue to monitor carbohydrate and protein intake you need a minimum of  Grams of protein daily- remember to keep your total carbohydrates to 50 grams or less per day for best results. 4. To maximize weight loss keep your caloric intake between 800-1,200 calories daily. If you are exercising excessively, such as training for a marathon, you need to keep a food log and meet with the dietician so they can advise you on your diet choices, carbohydrate intake and caloric intake. 5. Continue to work towards exercise goals - 60-90 minutes, 5 times a week minimum of deliberate, aerobic exercise is the ultimate goal with strength training 2 times each week. Refer to [employer] for  information. 6. Remember to take vitamins as directed in your handbook. 7. Attend support group the 2nd Thursday of each month. 8. Constipation: Milk of Magnesia is for immediate relief only. Miralax is to be used every day if constipation is a chronic problem. 9. Diarrhea: patients will occasionally develop lactose intolerance after surgery. Check to see if your protein shake has whey in it. If it does try a protein powder or drink that does not have whey and stop all yogurts, cheeses and milks to see if the diarrhea goes away. 10. If you have had labs drawn. We will only call you if you have abnormal results. Otherwise you can access the lab results in \"[facility]\". You will only need the access code the first time you sign on.    
11. Call us at [phone] or email us through [email or web address]" with questions,     concerns or worsening of condition, we have someone on call 24 hours a day. If you are unable to reach our office, you are to go to your Primary Care Physician or the Emergency Department. Supplement Resource Guide Importance of Protein:  
Maintains lean body mass, produces antibodies to fight off infections, heals wounds, minimizes hair loss, helps to give you energy, helps with satiety, and keeping you full between meals. Importance of Calcium: 
Needed for healthy bones and teeth, normal blood clotting, and nervous system functioning, higher risk of osteoporosis and bone disease with non-compliance. Importance of Multivitamins: Many functions. Supply you with extra nutrients that you may be missing from food. May lead to iron deficiency anemia, weakness, fatigue, and many other symptoms with non-compliance. Importance of B Vitamins: 
Important for red blood cell formation, metabolism, energy, and helps to maintain a healthy nervous system. Protein Supplement Liquid diet phase: consume 90-100g protein daily. Once you are eating consume 35-50g protein each day from your protein supplement. 0-3 g fat per serving 0-3 g sugar per serving The body can only absorb 30g of protein at one time, so do not consume more than that at one time. Recommend: Lifelong use Multi-vitamin Supplement:   
 
Start immediately after surgery: any complete chewable, such as: [place]s Complete chewables. Avoid [place] sours or gummies. They lack iron and other important nutrients and also have added sugar. Continue with a chewable vitamin or change to an adult complete multivitamin one month after surgery. Menstruating women can take a prenatal vitamin. Make sure it has at least 18 mg iron and 107-448 mcg folic acid Calcium Supplement:  
 
Start taking within one month after surgery. Look for:  
Calcium Citrate Plus D (1500 mg per day) Recommend: Citracal 
 
Avoid chocolate chewable calcium. Can use chewable bariatric or [employer] brand or similar chewable. The body cannot absorb more than 500-600 mg of calcium at one time. Take for Life Vitamin B12 B Complex Vitamin Start taking both within one month after surgery. Vitamin B12 (sublingual): Take 1000 mcg of Vitamin B12 three times weekly Must take sublingually (meaning you put it under your tongue) or in a liquid drop form for easy absorption. B Complex Vitamin:  
Take one pill daily or liquid drop form daily; as directed on bottle. Take for Life Pancakes: 
1 scoop protein powder 1 egg 1 tsp baking powder 3 T water I use [place] protein powder Merly Ahr has new brownie bites, heat in microwave for <10 sec Read [employer] [place]

## 2020-02-26 ENCOUNTER — OFFICE VISIT (OUTPATIENT)
Dept: SURGERY | Age: 40
End: 2020-02-26

## 2020-02-26 ENCOUNTER — HOSPITAL ENCOUNTER (OUTPATIENT)
Dept: LAB | Age: 40
Discharge: HOME | End: 2020-02-26
Attending: SPECIALIST
Payer: COMMERCIAL

## 2020-02-26 VITALS
DIASTOLIC BLOOD PRESSURE: 59 MMHG | RESPIRATION RATE: 16 BRPM | HEIGHT: 62 IN | WEIGHT: 147 LBS | SYSTOLIC BLOOD PRESSURE: 98 MMHG | BODY MASS INDEX: 27.05 KG/M2 | OXYGEN SATURATION: 100 % | TEMPERATURE: 98.3 F | HEART RATE: 58 BPM

## 2020-02-26 DIAGNOSIS — N85.2 ENLARGED UTERUS: ICD-10-CM

## 2020-02-26 DIAGNOSIS — K90.9 INTESTINAL MALABSORPTION, UNSPECIFIED TYPE: ICD-10-CM

## 2020-02-26 DIAGNOSIS — Z98.84 S/P BARIATRIC SURGERY: ICD-10-CM

## 2020-02-26 DIAGNOSIS — R10.13 EPIGASTRIC PAIN: Primary | ICD-10-CM

## 2020-02-26 LAB
25(OH)D3 SERPL-MCNC: 35.3 NG/ML (ref 30–100)
ALBUMIN SERPL-MCNC: 3.9 G/DL (ref 3.4–5)
ALBUMIN/GLOB SERPL: 1.1 {RATIO} (ref 0.8–1.7)
ALP SERPL-CCNC: 71 U/L (ref 45–117)
ALT SERPL-CCNC: 27 U/L (ref 13–56)
ANION GAP SERPL CALC-SCNC: 5 MMOL/L (ref 3–18)
AST SERPL-CCNC: 21 U/L (ref 10–38)
BASOPHILS # BLD: 0 K/UL (ref 0–0.1)
BASOPHILS NFR BLD: 0 % (ref 0–2)
BILIRUB SERPL-MCNC: 0.4 MG/DL (ref 0.2–1)
BUN SERPL-MCNC: 8 MG/DL (ref 7–18)
BUN/CREAT SERPL: 14 (ref 12–20)
CALCIUM SERPL-MCNC: 8.8 MG/DL (ref 8.5–10.1)
CHLORIDE SERPL-SCNC: 105 MMOL/L (ref 100–111)
CO2 SERPL-SCNC: 31 MMOL/L (ref 21–32)
CREAT SERPL-MCNC: 0.59 MG/DL (ref 0.6–1.3)
DIFFERENTIAL METHOD BLD: ABNORMAL
EOSINOPHIL # BLD: 0 K/UL (ref 0–0.4)
EOSINOPHIL NFR BLD: 1 % (ref 0–5)
ERYTHROCYTE [DISTWIDTH] IN BLOOD BY AUTOMATED COUNT: 14.1 % (ref 11.6–14.5)
FERRITIN SERPL-MCNC: 18 NG/ML (ref 8–388)
FOLATE SERPL-MCNC: >20 NG/ML (ref 3.1–17.5)
GLOBULIN SER CALC-MCNC: 3.4 G/DL (ref 2–4)
GLUCOSE SERPL-MCNC: 75 MG/DL (ref 74–99)
HCT VFR BLD AUTO: 39 % (ref 35–45)
HGB BLD-MCNC: 12.7 G/DL (ref 12–16)
IRON SERPL-MCNC: 56 UG/DL (ref 50–175)
LYMPHOCYTES # BLD: 1.9 K/UL (ref 0.9–3.6)
LYMPHOCYTES NFR BLD: 42 % (ref 21–52)
MCH RBC QN AUTO: 29.5 PG (ref 24–34)
MCHC RBC AUTO-ENTMCNC: 32.6 G/DL (ref 31–37)
MCV RBC AUTO: 90.5 FL (ref 74–97)
MONOCYTES # BLD: 0.4 K/UL (ref 0.05–1.2)
MONOCYTES NFR BLD: 9 % (ref 3–10)
NEUTS SEG # BLD: 2.1 K/UL (ref 1.8–8)
NEUTS SEG NFR BLD: 48 % (ref 40–73)
PLATELET # BLD AUTO: 235 K/UL (ref 135–420)
PMV BLD AUTO: 10.7 FL (ref 9.2–11.8)
POTASSIUM SERPL-SCNC: 4 MMOL/L (ref 3.5–5.5)
PROT SERPL-MCNC: 7.3 G/DL (ref 6.4–8.2)
RBC # BLD AUTO: 4.31 M/UL (ref 4.2–5.3)
SODIUM SERPL-SCNC: 141 MMOL/L (ref 136–145)
VIT B12 SERPL-MCNC: >2000 PG/ML (ref 211–911)
WBC # BLD AUTO: 4.4 K/UL (ref 4.6–13.2)

## 2020-02-26 PROCEDURE — 83540 ASSAY OF IRON: CPT

## 2020-02-26 PROCEDURE — 80053 COMPREHEN METABOLIC PANEL: CPT

## 2020-02-26 PROCEDURE — 85025 COMPLETE CBC W/AUTO DIFF WBC: CPT

## 2020-02-26 PROCEDURE — 82306 VITAMIN D 25 HYDROXY: CPT

## 2020-02-26 PROCEDURE — 36415 COLL VENOUS BLD VENIPUNCTURE: CPT

## 2020-02-26 PROCEDURE — 84425 ASSAY OF VITAMIN B-1: CPT

## 2020-02-26 PROCEDURE — 82746 ASSAY OF FOLIC ACID SERUM: CPT

## 2020-02-26 PROCEDURE — 82607 VITAMIN B-12: CPT

## 2020-02-26 PROCEDURE — 82728 ASSAY OF FERRITIN: CPT

## 2020-02-26 NOTE — PROGRESS NOTES
3 years follow-up    Subjective:     Lizette Middleton  is a 37 y.o. female who presents for follow-up about 2.83 years following laparoscopic gastric bypass surgery. She has lost a total of 83 pounds since surgery. Body mass index is 26.89 kg/m². . EBWL is (79%). The patient presents today to assess their progress toward their goal of weight loss and to address any issues that may be present. Today the patient and I have reviewed their diet and how appropriate their food choices are. The following issues have been identified - pt with 4-5 week hx of \"dull ache\" in her epigastric region (with radiation to the right) associated with intake of dairy products. Of note is the fact that she has no risks factors for ulcer formation     Pain assessment - 0/10 currently  . Surgery related complication: prior banding pt       She reports as above with 2 episodes of vomiting and denies diarrhea and difficulty breathing. The patient's exercise level: very active. Changes in her medical history and medications have been reviewed.     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    Intestinal malabsorption K90.9    S/P bariatric surgery Z98.84     Past Medical History:   Diagnosis Date    Arthritic-like pain     Arthritis     osteo    Chronic back pain     GERD (gastroesophageal reflux disease)     History of adjustable gastric banding     placed 2011 / removed 2013 all in [address] Hourglass stricture of stomach     Irregular menses     due to PCOS    Migraine     Morbid obesity ([facility] [address]     Morbid obesity with BMI of 40.0-44.9, adult ([facility] [address]     Nausea & vomiting     severe    PCOS (polycystic ovarian syndrome)     AVELINO (stress urinary incontinence, female)      Past Surgical History:   Procedure Laterality Date    HX GI  2011    placment of gastric band in [facility] [address] [place] HX GI  2013    removal of gastric band in [place] (not original surgeon)    HX HEENT      mulitple tympanic membrane surgeries as a child    HX TONSIL AND ADENOIDECTOMY      HX TUBAL LIGATION       Current Outpatient Medications   Medication Sig Dispense Refill    cholecalciferol (VITAMIN D3) 1,000 unit tablet Take  by mouth daily.  lysine (L-LYSINE) 500 mg tab tablet Take  by mouth three (3) times daily (with meals).  omeprazole (PRILOSEC) 20 mg capsule Take 20 mg by mouth daily.  CALCIUM CITRATE PO Take  by mouth.  cyanocobalamin (VITAMIN B-12) 1,000 mcg sublingual tablet Take 1,000 mcg by mouth daily.  BIOTIN PO Take  by mouth.  VITAMIN B COMPLEX PO Take  by mouth.  loratadine (CLARITIN) 10 mg tablet Take 10 mg by mouth.  L. RHAMNOSUS GG/INULIN (CULTURELLE PROBIOTICS PO) Take  by mouth.  multivitamin with iron (FLINTSTONES) chewable tablet Take 1 Tab by mouth two (2) times a day.          Review of Symptoms:     General - No history or complaints of unexpected fever or chills  Head/Neck - No history or complaints of headache or dizziness  Cardiac - No history or complaints of chest pain, palpitations, or shortness of breath  Pulmonary - No history or complaints of shortness of breath or productive cough  Gastrointestinal - as noted above  Genitourinary - No history or complaints of hematuria/dysuria or renal lithiasis  Musculoskeletal - No history or complaints of joint  muscular weakness  Hematologic - No history of any bleeding episodes  Neurologic - No history or complaints of  migraine headaches or neurologic symptoms    Objective:     Visit Vitals  BP 98/59 (BP 1 Location: Left arm, BP Patient Position: Sitting)   Pulse (!) 58   Temp 98.3 °F (36.8 °C)   Resp 16   Ht 5' 2\" (1.575 m)   Wt 66.7 kg (147 lb)   SpO2 100%   BMI 26.89 kg/m²        Physical Exam:     Physical Examination: General appearance - alert, well appearing, and in no distress and oriented to person, place, and time  Mental status - alert, oriented to person, place, and time, normal mood, behavior, speech, dress, motor activity, and thought processes  Eyes - pupils equal and reactive, extraocular eye movements intact, sclera anicteric, left eye normal, right eye normal  Chest - clear to auscultation, no wheezes, rales or rhonchi, symmetric air entry  Heart - normal rate, regular rhythm, normal S1, S2, no murmurs, rubs, clicks or gallops  Abdomen - soft, nontender, nondistended, the patient has a significantly enlarged uterus which extends well above her pubis probably in the range of an 18-week equivalent pregnancy. There is some asymmetry to this mass with it being more prominent on the left than the right  Back exam - full range of motion, no tenderness, palpable spasm or pain on motion  Neurological - alert, oriented, normal speech, no focal findings or movement disorder noted  Musculoskeletal - no joint tenderness, deformity or swelling  Extremities - peripheral pulses normal, no pedal edema, no clubbing or cyanosis    Labs:     No results found for this or any previous visit (from the past 2016 hour(s)). Assessment:     1. History of Morbid obesity, status post  laparoscopic gastric bypass surgery. The patient lives in [facility]. The patient has abdominal pain which is likely biliary tract related but she in addition has a large pelvic mass which is likely an incredibly enlarged uterus. We will order both an ultrasound of the abdomen to assess her gallbladder but in addition we will order a CT scan of the abdomen and pelvis to assess this pelvic mass.   She is asymptomatic from the standpoint of her pelvic mass and this is likely simply a fibroid uterus    Plan:       Plan at this time. Is as outlined above. We will make an attempt to obtain the studies at her local hospital in [place] however if that is not able to be achieved then she will drive up to a facility in [place] or [place] for these studies.

## 2020-02-26 NOTE — PROGRESS NOTES
1. Have you been to the ER, urgent care clinic since your last visit? Hospitalized since your last visit? No    2. Have you seen or consulted any other health care providers outside of the [address] [place] [address] since your last visit? Include any pap smears or colon screening.  No        Chief Complaint   Patient presents with    Follow-up     Having issues with gallbladder

## 2020-02-26 NOTE — PATIENT INSTRUCTIONS

## 2020-02-28 LAB — VIT B1 BLD-SCNC: 152.7 NMOL/L (ref 66.5–200)

## 2021-04-09 ENCOUNTER — OFFICE VISIT (OUTPATIENT)
Dept: SURGERY | Age: 41
End: 2021-04-09
Payer: COMMERCIAL

## 2021-04-09 ENCOUNTER — HOSPITAL ENCOUNTER (OUTPATIENT)
Dept: LAB | Age: 41
Discharge: HOME | End: 2021-04-09
Payer: COMMERCIAL

## 2021-04-09 ENCOUNTER — TRANSCRIBE ORDER (OUTPATIENT)
Dept: REGISTRATION | Age: 41
End: 2021-04-09

## 2021-04-09 VITALS
WEIGHT: 141.5 LBS | DIASTOLIC BLOOD PRESSURE: 60 MMHG | HEART RATE: 61 BPM | TEMPERATURE: 98.1 F | SYSTOLIC BLOOD PRESSURE: 99 MMHG | BODY MASS INDEX: 26.04 KG/M2 | HEIGHT: 62 IN | OXYGEN SATURATION: 100 %

## 2021-04-09 DIAGNOSIS — K90.9 INTESTINAL MALABSORPTION, UNSPECIFIED TYPE: ICD-10-CM

## 2021-04-09 DIAGNOSIS — R10.13 EPIGASTRIC PAIN: Primary | ICD-10-CM

## 2021-04-09 LAB
25(OH)D3 SERPL-MCNC: 26.6 NG/ML (ref 30–100)
ALBUMIN SERPL-MCNC: 3.6 G/DL (ref 3.4–5)
ALBUMIN/GLOB SERPL: 1 {RATIO} (ref 0.8–1.7)
ALP SERPL-CCNC: 97 U/L (ref 45–117)
ALT SERPL-CCNC: 27 U/L (ref 13–56)
ANION GAP SERPL CALC-SCNC: 3 MMOL/L (ref 3–18)
AST SERPL-CCNC: 23 U/L (ref 10–38)
BASOPHILS # BLD: 0 K/UL (ref 0–0.1)
BASOPHILS NFR BLD: 0 % (ref 0–2)
BILIRUB SERPL-MCNC: 0.5 MG/DL (ref 0.2–1)
BUN SERPL-MCNC: 10 MG/DL (ref 7–18)
BUN/CREAT SERPL: 20 (ref 12–20)
CALCIUM SERPL-MCNC: 8.2 MG/DL (ref 8.5–10.1)
CHLORIDE SERPL-SCNC: 106 MMOL/L (ref 100–111)
CO2 SERPL-SCNC: 31 MMOL/L (ref 21–32)
CREAT SERPL-MCNC: 0.51 MG/DL (ref 0.6–1.3)
DIFFERENTIAL METHOD BLD: ABNORMAL
EOSINOPHIL # BLD: 0 K/UL (ref 0–0.4)
EOSINOPHIL NFR BLD: 1 % (ref 0–5)
ERYTHROCYTE [DISTWIDTH] IN BLOOD BY AUTOMATED COUNT: 17.5 % (ref 11.6–14.5)
FERRITIN SERPL-MCNC: 3 NG/ML (ref 8–388)
FOLATE SERPL-MCNC: >20 NG/ML (ref 3.1–17.5)
GLOBULIN SER CALC-MCNC: 3.7 G/DL (ref 2–4)
GLUCOSE SERPL-MCNC: 78 MG/DL (ref 74–99)
HCT VFR BLD AUTO: 30.7 % (ref 35–45)
HGB BLD-MCNC: 9.4 G/DL (ref 12–16)
IRON SERPL-MCNC: 60 UG/DL (ref 50–175)
LYMPHOCYTES # BLD: 2 K/UL (ref 0.9–3.6)
LYMPHOCYTES NFR BLD: 38 % (ref 21–52)
MCH RBC QN AUTO: 24.9 PG (ref 24–34)
MCHC RBC AUTO-ENTMCNC: 30.6 G/DL (ref 31–37)
MCV RBC AUTO: 81.4 FL (ref 74–97)
MONOCYTES # BLD: 0.4 K/UL (ref 0.05–1.2)
MONOCYTES NFR BLD: 7 % (ref 3–10)
NEUTS SEG # BLD: 2.9 K/UL (ref 1.8–8)
NEUTS SEG NFR BLD: 54 % (ref 40–73)
PLATELET # BLD AUTO: 277 K/UL (ref 135–420)
PMV BLD AUTO: 10.7 FL (ref 9.2–11.8)
POTASSIUM SERPL-SCNC: 3.8 MMOL/L (ref 3.5–5.5)
PROT SERPL-MCNC: 7.3 G/DL (ref 6.4–8.2)
RBC # BLD AUTO: 3.77 M/UL (ref 4.2–5.3)
SODIUM SERPL-SCNC: 140 MMOL/L (ref 136–145)
VIT B12 SERPL-MCNC: >2000 PG/ML (ref 211–911)
WBC # BLD AUTO: 5.3 K/UL (ref 4.6–13.2)

## 2021-04-09 PROCEDURE — 82728 ASSAY OF FERRITIN: CPT

## 2021-04-09 PROCEDURE — 85025 COMPLETE CBC W/AUTO DIFF WBC: CPT

## 2021-04-09 PROCEDURE — 82306 VITAMIN D 25 HYDROXY: CPT

## 2021-04-09 PROCEDURE — 99215 OFFICE O/P EST HI 40 MIN: CPT | Performed by: SPECIALIST

## 2021-04-09 PROCEDURE — 82607 VITAMIN B-12: CPT

## 2021-04-09 PROCEDURE — 84425 ASSAY OF VITAMIN B-1: CPT

## 2021-04-09 PROCEDURE — 83540 ASSAY OF IRON: CPT

## 2021-04-09 PROCEDURE — 36415 COLL VENOUS BLD VENIPUNCTURE: CPT

## 2021-04-09 PROCEDURE — 80053 COMPREHEN METABOLIC PANEL: CPT

## 2021-04-09 NOTE — PROGRESS NOTES
Surgery Consultation    History of Present Illness:    Len Vila is a prior laparoscopic gastric bypass surgery   patient who underwent their weight loss surgical procedure procedure approximately 4 years ago. The patient had been doing fine from her surgical procedure and had reached her weight   loss goal when she presented to the hospital in [place] with acute onset abdominal pain. Some time thereafter she was diagnosed with a perforation of her gastric remnant and underwent   immediate exploration by a surgeon locally there. The surgical procedure is listed below and has been reviewed in total.  It appears that during the procedure she underwent a complete exploration   with sealing of her gastric remnant via a partial resection. 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 In addition she had a feeding jejunostomy tube placed. She had a very prolonged recovery from this exploration and finally recovered uneventfully and the feeding tube was removed. As of late   the patient has been doing much better overall with a stable weight at what I would consider her ideal body weight. She does describe some gastrointestinal upset on occasion with eating and   has had no recent lab work or evaluation for her symptoms. She now presents for further evaluation of her symptoms and for routine long-term follow-up. As I have stated already   the patient does feel much better overall and seems to be recovering nicely from her surgical procedure.     Past Medical History:   Diagnosis Date    Arthritic-like pain     Arthritis     osteo    Chronic back pain     GERD (gastroesophageal reflux disease)     History of adjustable gastric banding     placed 2011 / removed 2013 all in [address] stricture of stomach     Irregular menses     due to PCOS    Migraine     Morbid obesity ([address]     Morbid obesity with BMI of 40.0-44.9, adult (HCC)     Nausea & vomiting     severe    PCOS (polycystic ovarian syndrome)     AVELINO (stress urinary incontinence, female)      Past Surgical History:   Procedure Laterality Date    HX GI  2011    placment of gastric band in [facility] HX GI  2013    removal of gastric band in [place] (not original surgeon)    HX HEENT      mulitple tympanic membrane surgeries as a child    HX TONSIL AND ADENOIDECTOMY      HX TUBAL LIGATION        Family History   Problem Relation Age of Onset    Arthritis-osteo Mother     Arthritis-osteo Father     Coronary Artery Disease Father      Social History     Socioeconomic History    Marital status:      Spouse name: Not on file    Number of children: Not on file    Years of education: Not on file    Highest education level: Not on file   Tobacco Use    Smoking status: Never Smoker    Smokeless tobacco: Never Used   Substance and Sexual Activity    Alcohol use: No    Drug use: No    Sexual activity: Yes     Partners: Male     Birth control/protection: Surgical      Current Outpatient Medications   Medication Sig    cholecalciferol (VITAMIN D3) 1,000 unit tablet Take  by mouth daily.  lysine (L-LYSINE) 500 mg tab tablet Take  by mouth three (3) times daily (with meals).  omeprazole (PRILOSEC) 20 mg capsule Take 20 mg by mouth daily.  CALCIUM CITRATE PO Take  by mouth.  cyanocobalamin (VITAMIN B-12) 1,000 mcg sublingual tablet Take 1,000 mcg by mouth daily.  BIOTIN PO Take  by mouth.  VITAMIN B COMPLEX PO Take  by mouth.  L. RHAMNOSUS GG/INULIN (CULTURELLE PROBIOTICS PO) Take  by mouth.  multivitamin with iron (FLINTSTONES) chewable tablet Take 1 Tab by mouth two (2) times a day.  ergocalciferol (ERGOCALCIFEROL) 1,250 mcg (50,000 unit) capsule Take 1 cap by mouth once a week for 1 year    loratadine (CLARITIN) 10 mg tablet Take 10 mg by mouth.      No current facility-administered medications for this visit. No Known Allergies    Review of Symptoms:     General - No history or complaints of unexpected fever, chills, or weight loss  Head/Neck - No history or complaints of headache, diplopia, dysphagia, hearing loss  Cardiac - No history or complaints of chest pain, palpitations, murmur, or shortness of breath  Pulmonary - No history or complaints of shortness of breath, productive cough, hemoptysis  Gastrointestinal - As per the HPI above.   Genitourinary - No history or complaints of hematuria/dysuria, stress urinary incontinence symptoms, or renal lithiasis  Musculoskeletal - No history or complaints of joint pain or muscular weakness  Hematologic - No history or complaints of bleeding disorders, blood transfusions, sickle cell anemia  Neurologic - No history or complaints of  migraine headaches, seizure activity, syncopal episodes, TIA or stroke  Integumentary - No history or complaints of rashes, abnormal nevi, skin cancer  Gynecological - No history of heavy menses/abnormal menses        Physical Exam:     Physical Examination: General appearance - alert, well appearing, and in no distress and oriented to person, place, and time  Mental status - alert, oriented to person, place, and time, normal mood, behavior, speech, dress, motor activity, and thought processes  Eyes - pupils equal and reactive, extraocular eye movements intact, sclera anicteric, left eye normal, right eye normal  Ears - right ear normal, left ear normal  Neck - supple, no significant adenopathy  Chest - clear to auscultation, no wheezes, rales or rhonchi, symmetric air entry  Heart - normal rate, regular rhythm, normal S1, S2, no murmurs, rubs, clicks or gallops  Abdomen - soft, nontender, nondistended, no masses or organomegaly  Healed abdominal incision without hernia  Back exam - full range of motion, no tenderness, palpable spasm or pain on motion  Neurological - alert, oriented, normal speech, no focal findings or movement disorder noted  Musculoskeletal - no joint tenderness, deformity or swelling  Extremities - peripheral pulses normal, no pedal edema, no clubbing or cyanosis         Laboratory / X-Rays:      Lab Results   Component Value Date/Time    WBC 5.3 04/09/2021 12:23 PM    HGB 9.4 (L) 04/09/2021 12:23 PM    HCT 30.7 (L) 04/09/2021 12:23 PM    PLATELET 749 32/60/4516 12:23 PM    MCV 81.4 04/09/2021 12:23 PM     Lab Results   Component Value Date/Time    Sodium 140 04/09/2021 12:23 PM    Potassium 3.8 04/09/2021 12:23 PM    Chloride 106 04/09/2021 12:23 PM    CO2 31 04/09/2021 12:23 PM    Anion gap 3 04/09/2021 12:23 PM    Glucose 78 04/09/2021 12:23 PM    BUN 10 04/09/2021 12:23 PM    Creatinine 0.51 (L) 04/09/2021 12:23 PM    BUN/Creatinine ratio 20 04/09/2021 12:23 PM    GFR est AA >60 04/09/2021 12:23 PM    GFR est non-AA >60 04/09/2021 12:23 PM    Calcium 8.2 (L) 04/09/2021 12:23 PM    Bilirubin, total 0.5 04/09/2021 12:23 PM    Alk. phosphatase 97 04/09/2021 12:23 PM    Protein, total 7.3 04/09/2021 12:23 PM    Albumin 3.6 04/09/2021 12:23 PM    Globulin 3.7 04/09/2021 12:23 PM    A-G Ratio 1.0 04/09/2021 12:23 PM    ALT (SGPT) 27 04/09/2021 12:23 PM     Lab Results   Component Value Date/Time    Iron 60 04/09/2021 12:23 PM    Ferritin 3 (L) 04/09/2021 12:24 PM     Lab Results   Component Value Date/Time    Folate >20.0 (H) 04/09/2021 12:23 PM     Lab Results   Component Value Date/Time    Vitamin D 25-Hydroxy 26.6 (L) 04/09/2021 12:24 PM           All outside records are listed below:          Procedure - Operative - Morteza Kaufman MD - 06/09/2020 7:04 AM EDT  Formatting of this note might be different from the original.    OPERATIVE REPORT    DATE OF PROCEDURE: 06/08/2020    PREOPERATIVE DIAGNOSIS: Abdominal mass. POSTOPERATIVE DIAGNOSIS: Abdominal mass/perforated hollow viscus. SURGEON: MD Lu Asher. BLOOD LOSS: 250 mL.     FLUIDS: Per anesthesia record. PROCEDURE: 1. Exploratory laparotomy, lysis of adhesions; 2. Subtotal gastrectomy; 3. Small bowel resection; 4. Resection of 12 cm intra-abdominal tumor mass; 5. Duodenojejunostomy; 6. jejuno-jejunostomy; 7. Feeding jejunostomy    DRAINS: One #19 round Mehul drain that enters in the left lower quadrant  and ends below the duodenal enterostomy and the enteroenterostomy. SPECIMENS: Specimen A. Stomach; B. Tumor mass; C. Proximal Ileum; D. Proximal Jejunum  E. Distal Fritz limb    COMPLICATIONS: None. FINDINGS: The excluded stomach perforated and decompressed a large tumor  mass encompassing the previous enteroenterostomy causing a functional bowel  obstruction of the pancreaticobiliary limb after a Fritz-Y gastric bypass,  viable bowel at conclusion of the operation. COMPLICATIONS: None. PROCEDURE(S) PERFORMED:  1. Exploratory laparotomy. 2. Lysis of adhesions. 3. Partial gastrectomy, resection of the excluded stomach, status post  Fritz-Y gastric bypass. 4. Small bowel resection with removal of associated tumor. 5. Additional small bowel resection to achieve viable ends on the distal  Fritz limb and the proximal jejunum. 6. End-to-side duodenal enterostomy. 7. End-to-end enteroenterostomy between the Fritz-Y limb and the proximal  jejunum. INDICATIONS: This is a 42-year-old woman with an unknown large abdominal  pain, who came to the hospital one day prior to surgery complaining of  severe abdominal pain. She underwent a CT scan which showed a dilated  excluded stomach, status post Fritz-Y gastric bypass and no free fluid, no  free air. She was stable over the course of the day, yesterday, with plans  to do an elective operation today to remove her tumor; however, at  approximately 4:00 p.m. on 06/08/2020 she suddenly became tachycardic and  hypotensive. She was brought emergently to the operating room and was noted  to have a perforation of her excluded stomach.     PROCEDURE IN DETAIL: The patient was placed on the operating table in the  supine position. After general anesthesia was induced, her abdomen was  prepped and draped in the usual sterile fashion. The operation was  undertaken through an upper transverse incision. The Omni retractor was  used to gain exposure. A large wound protector was used to protect the  wound. The operation was begun by washing out the abdominal cavity. There  were several liters of bile stained fluid within the abdominal cavity. This  ultimately reached an endpoint and was able to be completely evacuated. Once that was done, we placed a retractor such that we could visualize the  upper quadrants of the abdomen. We lysed some adhesions between the liver  and the Fritz limb. The Fritz limb and gastric pouch appeared to be intact  and viable. The excluded gastric stomach over the anterior wall of the  antrum of the stomach had a large perforation, which was not associated with  any areas of mike necrosis or ischemia, but rather due to distention. This  was initially stapled; however, due to concerns about the viability of the  excluded gastric stomach, we proceeded to dissect this off of the gastric  pouch and resect approximately 75% of the excluded gastric stomach. We did  so and divided the stomach and the perforation was in the gastric body. We  divided the stomach across the antrum with a single fire of the RICHI 80  stapler and oversewed that staple line with interrupted 3-0 silk suture. When that was concluded, we then focused our attention on resection of the  tumor mass. It was in the location as described arising from essentially the root of the  mesentery and encompassing the enteroenterostomy from her prior Fritz-Y  gastric bypass. This required resection of approximately 20 cm of the long  Fritz limb and approximately 20 cm of the proximal jejunum as well as the  duodenum all the way up to the ligament of Treitz. These were all divided  with a RICHI 80 stapler. The tumor was removed. Margins were negative  grossly upon removal of the tumor and we proceeded with our reconstruction. We first did an end-to-side duodenal enterostomy into the proximal jejunum. We did so with interrupted 3-0 silk seromuscular suture and a running 3-0  Vicryl suture to approximate the mucosa and an anterior row of interrupted  3-0 silk seromuscular suture was used to complete the anastomosis. When  that was completed, this left approximately 20 cm of jejunum proximal to the  duodenal enterostomy and we did an end-to-end enteroenterostomy between the  Fritz limb and the jejunum. We did this in a similar fashion using  interrupted 3-0 silk seromuscular, running 3-0 Vicryl suture to approximate  the mucosa, simple posteriorly and canal anteriorly and an anterior row of  interrupted 3-0 silk seromuscular suture that was completed. We closed the  mesenteric defect, created in the configuration using interrupted 3-0 silk  figure-of-eight sutures. We had to trim back both the proximal jejunum and  the distal Fritz limb in order to achieve what appeared to be clearly viable  ends. Once that was completed, we placed a feeding jejunostomy and this was  placed approximately 20 cm distal to the duodenal enterostomy. We did so by  placing a 12-Prydeinig feeding tube into the jejunum over a 3-0 silk  pursestring suture. We sewed that in place with 3 interrupted 3-0 silk  suture and then attached it to the right lower quadrant abdominal wall in a  Allen type fashion using interrupted 3-0 silk suture. We confirmed patency  of the feeding jejunostomy tube and secured it to the skin with 3-0 nylon  suture. Once this was completed, we irrigated the abdomen with  approximately 4 liters of saline irrigation. We confirmed that sponge and  instrument counts were correct and that hemostasis was adequate. We closed  in 2 layers using #1 PDS suture and stapled the skin shut. We dressed the  wound with Telfa and Tegaderm.  The patient tolerated the procedure well and  was taken to the recovery room in satisfactory condition. Oliver Baker MD    D: 06/09/2020 07:04:57  T: 06/09/2020 10:13:59  /[facility]  [id number]         Assessment:     Kirsten Snider is a prior laparoscopic gastric bypass surgery   patient who underwent their weight loss surgical procedure procedure   approximately 4 years ago. 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 She seems to be doing fine with the exception of some occasional abdominal discomfort. Recommendations: At this juncture I explained to the patient that we need to go ahead and obtain her routine yearly lab work and in addition we will see if she is still anemic since   she has some fatigue still present from her significant issue that she suffered from last year. Overall given the magnitude of her exploration I do feel like she is   doing quite well. She does certainly have a possible risk of a marginal ulcer given her stressful situation that she has endured. I have recommended that we   can go ahead and perform an upper GI study on her to assess her pouch anastomosis in addition to her small bowel anastomosis. She did suffer from a   partial small bowel obstruction postoperatively which resolved spontaneously. She seems very amenable to all of this and we will proceed as is outlined.     Signed By: Cathern Buerger, MD     April 26, 2021

## 2021-04-15 RX ORDER — ERGOCALCIFEROL 1.25 MG/1
CAPSULE ORAL
Qty: 52 CAP | Refills: 1 | Status: SHIPPED | OUTPATIENT
Start: 2021-04-15 | End: 2022-05-09 | Stop reason: SDUPTHER

## 2021-04-15 NOTE — PROGRESS NOTES
Spoke with pt she is aware of what Dr Dinah Cheney stated: Refer to [facility] for iron infusion. Give vitamin D Rx also    Pt stated that she is not pregnant, rx Vit D sent to pharmacy. I will also forward to [facility] for the [facility] for iron infusion.

## 2021-04-16 LAB — VIT B1 BLD-SCNC: 107.4 NMOL/L (ref 66.5–200)

## 2021-05-12 ENCOUNTER — APPOINTMENT (OUTPATIENT)
Dept: GENERAL RADIOLOGY | Age: 41
End: 2021-05-12
Attending: SPECIALIST
Payer: COMMERCIAL

## 2021-05-12 ENCOUNTER — APPOINTMENT (OUTPATIENT)
Dept: SURGERY | Age: 41
End: 2021-05-12

## 2021-05-12 ENCOUNTER — HOSPITAL ENCOUNTER (OUTPATIENT)
Age: 41
Setting detail: OUTPATIENT SURGERY
Discharge: HOME | End: 2021-05-12
Attending: SPECIALIST | Admitting: SPECIALIST
Payer: COMMERCIAL

## 2021-05-12 VITALS
OXYGEN SATURATION: 100 % | TEMPERATURE: 97.6 F | BODY MASS INDEX: 25.59 KG/M2 | RESPIRATION RATE: 18 BRPM | DIASTOLIC BLOOD PRESSURE: 63 MMHG | HEIGHT: 62 IN | SYSTOLIC BLOOD PRESSURE: 102 MMHG | WEIGHT: 139.06 LBS | HEART RATE: 62 BPM

## 2021-05-12 DIAGNOSIS — E66.01 MORBID OBESITY (HCC): ICD-10-CM

## 2021-05-12 DIAGNOSIS — K21.9 GASTROESOPHAGEAL REFLUX DISEASE WITHOUT ESOPHAGITIS: ICD-10-CM

## 2021-05-12 PROCEDURE — 76040000019: Performed by: SPECIALIST

## 2021-05-12 PROCEDURE — 74240 X-RAY XM UPR GI TRC 1CNTRST: CPT

## 2021-05-12 PROCEDURE — 74011000250 HC RX REV CODE- 250: Performed by: SPECIALIST

## 2021-05-12 PROCEDURE — 74240 X-RAY XM UPR GI TRC 1CNTRST: CPT | Performed by: SPECIALIST

## 2021-05-12 RX ORDER — METOCLOPRAMIDE 10 MG/1
10 TABLET ORAL
Qty: 30 TAB | Refills: 5 | Status: SHIPPED | OUTPATIENT
Start: 2021-05-12 | End: 2021-07-11

## 2021-05-12 NOTE — PROCEDURES
4 years post gastric bypass with need for laparotomy last summer by Dr. Sinan Genao for bowel resection due to benign mass -     FINDINGS: The excluded stomach perforated and decompressed a large tumor  mass encompassing the previous enteroenterostomy causing a functional bowel  obstruction of the pancreaticobiliary limb after a Fritz-Y gastric bypass,  viable bowel at conclusion of the operation. Pt still with abd pain and dysphagia.   See dictation for findings

## 2021-05-15 NOTE — OP NOTES
[facility]  OPERATIVE [facility]PORT    Name:  Branden Mckee  MR#:   [id number]  :  1980  ACCOUNT #:  [de-identified]  DATE OF SERVICE:  2021    PREOPERATIVE DIAGNOSIS:  The patient is four years postoperative from gastric bypass procedure with subsequent reexploration one year ago for a small bowel obstruction, which resulted in gastric remnant rupture. POSTOPERATIVE DIAGNOSIS:  The patient is four years postoperative from gastric bypass procedure with subsequent reexploration one year ago for a small bowel obstruction, which resulted in gastric remnant rupture. PROCEDURE PERFORMED:  Upper GI study with barium. SURGEON:  Cisco Ambrosio MD    ASSISTANT:  None. ANESTHESIA:  None. COMPLICATIONS:  None. SPECIMENS REMOVED:  None. IMPLANTS:  None. ESTIMATED BLOOD LOSS:  None. STATEMENT OF MEDICAL NECESSITY:  The patient is a 53-year-old female who four years ago underwent a gastric bypass procedure performed by me. She did very well after surgery, but last year while in [place] suffered a rupture of her gastric remnant due to bowel obstruction. She underwent exploration where her Fritz anastomosis was reconstructed. She had partial resection of her gastric remnant, and she had a prolonged hospitalization with early postoperative bowel obstruction also. She presented to me the other day for routine followup. Lab work was obtained, but she described chronic bloating and distention symptoms. She is here today for upper GI study to assess her gastric anatomy. PROCEDURE:  The patient was brought to the fluoro unit where she was given thin barium. On swallowing the barium, she was noted to have normal peristalsis of her esophagus. She had filling in the distal esophagus with tapering into and through the gastroesophageal junction. Contrast then filled a tiny gastric pouch, which was totally normal in caliber and dimension.   Fritz limb was nondilated, and we began to follow this out distally for about a foot and a half. At this point, contrast did not progress any further. We had her come back in intervals of 5 minutes, then 10 minutes, then 30 minutes. Contrast was very slow to pass through the more distal small bowel, and at the conclusion of this procedure, she had probably filled out only about three to four feet of small bowel. At this junction, she did state that she had some type of x-ray in the past, which showed a very slow transit time of contrast through her small bowel. I think the best course of action would be to go ahead and try some Reglan on her. I will give her 10 mg tablet three times a day to see if this helps her with her symptoms.       Elias Judd MD      [id number]  D:  05/15/2021 9:29  T:  05/15/2021 17:04  JOB #:  [id number]

## 2022-03-18 PROBLEM — K90.9 INTESTINAL MALABSORPTION: Status: ACTIVE | Noted: 2017-05-03

## 2022-03-19 PROBLEM — Z98.84 S/P BARIATRIC SURGERY: Status: ACTIVE | Noted: 2017-05-03

## 2022-05-04 ENCOUNTER — HOSPITAL ENCOUNTER (OUTPATIENT)
Dept: LAB | Age: 42
Discharge: HOME | End: 2022-05-04
Payer: COMMERCIAL

## 2022-05-04 ENCOUNTER — PATIENT MESSAGE (OUTPATIENT)
Dept: SURGERY | Age: 42
End: 2022-05-04

## 2022-05-04 ENCOUNTER — OFFICE VISIT (OUTPATIENT)
Dept: SURGERY | Age: 42
End: 2022-05-04
Payer: COMMERCIAL

## 2022-05-04 VITALS
HEART RATE: 67 BPM | BODY MASS INDEX: 27.62 KG/M2 | DIASTOLIC BLOOD PRESSURE: 57 MMHG | HEIGHT: 62 IN | SYSTOLIC BLOOD PRESSURE: 98 MMHG | OXYGEN SATURATION: 100 % | RESPIRATION RATE: 16 BRPM | WEIGHT: 150.1 LBS | TEMPERATURE: 97.7 F

## 2022-05-04 DIAGNOSIS — K90.9 INTESTINAL MALABSORPTION, UNSPECIFIED TYPE: Primary | ICD-10-CM

## 2022-05-04 DIAGNOSIS — K90.9 INTESTINAL MALABSORPTION, UNSPECIFIED TYPE: ICD-10-CM

## 2022-05-04 LAB
25(OH)D3 SERPL-MCNC: 27.2 NG/ML (ref 30–100)
ALBUMIN SERPL-MCNC: 3.6 G/DL (ref 3.4–5)
ALBUMIN/GLOB SERPL: 1.1 {RATIO} (ref 0.8–1.7)
ALP SERPL-CCNC: 93 U/L (ref 45–117)
ALT SERPL-CCNC: 38 U/L (ref 13–56)
ANION GAP SERPL CALC-SCNC: 1 MMOL/L (ref 3–18)
AST SERPL-CCNC: 26 U/L (ref 10–38)
BASOPHILS # BLD: 0 K/UL (ref 0–0.1)
BASOPHILS NFR BLD: 0 % (ref 0–2)
BILIRUB SERPL-MCNC: 0.5 MG/DL (ref 0.2–1)
BUN SERPL-MCNC: 11 MG/DL (ref 7–18)
BUN/CREAT SERPL: 18 (ref 12–20)
CALCIUM SERPL-MCNC: 8.2 MG/DL (ref 8.5–10.1)
CHLORIDE SERPL-SCNC: 108 MMOL/L (ref 100–111)
CO2 SERPL-SCNC: 31 MMOL/L (ref 21–32)
CREAT SERPL-MCNC: 0.61 MG/DL (ref 0.6–1.3)
DIFFERENTIAL METHOD BLD: ABNORMAL
EOSINOPHIL # BLD: 0 K/UL (ref 0–0.4)
EOSINOPHIL NFR BLD: 1 % (ref 0–5)
ERYTHROCYTE [DISTWIDTH] IN BLOOD BY AUTOMATED COUNT: 12.9 % (ref 11.6–14.5)
FERRITIN SERPL-MCNC: 86 NG/ML (ref 8–388)
FOLATE SERPL-MCNC: 16 NG/ML (ref 3.1–17.5)
GLOBULIN SER CALC-MCNC: 3.3 G/DL (ref 2–4)
GLUCOSE SERPL-MCNC: 89 MG/DL (ref 74–99)
HCT VFR BLD AUTO: 36.9 % (ref 35–45)
HGB BLD-MCNC: 11.9 G/DL (ref 12–16)
IMM GRANULOCYTES # BLD AUTO: 0 K/UL (ref 0–0.04)
IMM GRANULOCYTES NFR BLD AUTO: 0 % (ref 0–0.5)
IRON SERPL-MCNC: 114 UG/DL (ref 50–175)
LYMPHOCYTES # BLD: 1.5 K/UL (ref 0.9–3.6)
LYMPHOCYTES NFR BLD: 40 % (ref 21–52)
MCH RBC QN AUTO: 30 PG (ref 24–34)
MCHC RBC AUTO-ENTMCNC: 32.2 G/DL (ref 31–37)
MCV RBC AUTO: 92.9 FL (ref 78–100)
MONOCYTES # BLD: 0.4 K/UL (ref 0.05–1.2)
MONOCYTES NFR BLD: 10 % (ref 3–10)
NEUTS SEG # BLD: 1.9 K/UL (ref 1.8–8)
NEUTS SEG NFR BLD: 49 % (ref 40–73)
NRBC # BLD: 0 K/UL (ref 0–0.01)
NRBC BLD-RTO: 0 PER 100 WBC
PLATELET # BLD AUTO: 215 K/UL (ref 135–420)
PMV BLD AUTO: 10.3 FL (ref 9.2–11.8)
POTASSIUM SERPL-SCNC: 4.5 MMOL/L (ref 3.5–5.5)
PROT SERPL-MCNC: 6.9 G/DL (ref 6.4–8.2)
RBC # BLD AUTO: 3.97 M/UL (ref 4.2–5.3)
SODIUM SERPL-SCNC: 140 MMOL/L (ref 136–145)
VIT B12 SERPL-MCNC: 428 PG/ML (ref 211–911)
WBC # BLD AUTO: 3.9 K/UL (ref 4.6–13.2)

## 2022-05-04 PROCEDURE — 99214 OFFICE O/P EST MOD 30 MIN: CPT | Performed by: SPECIALIST

## 2022-05-04 PROCEDURE — 82306 VITAMIN D 25 HYDROXY: CPT

## 2022-05-04 PROCEDURE — 80053 COMPREHEN METABOLIC PANEL: CPT

## 2022-05-04 PROCEDURE — 85025 COMPLETE CBC W/AUTO DIFF WBC: CPT

## 2022-05-04 PROCEDURE — 83540 ASSAY OF IRON: CPT

## 2022-05-04 PROCEDURE — 84425 ASSAY OF VITAMIN B-1: CPT

## 2022-05-04 PROCEDURE — 82607 VITAMIN B-12: CPT

## 2022-05-04 PROCEDURE — 82728 ASSAY OF FERRITIN: CPT

## 2022-05-04 PROCEDURE — 36415 COLL VENOUS BLD VENIPUNCTURE: CPT

## 2022-05-04 PROCEDURE — 82746 ASSAY OF FOLIC ACID SERUM: CPT

## 2022-05-04 RX ORDER — LEVOCETIRIZINE DIHYDROCHLORIDE 5 MG/1
TABLET, FILM COATED ORAL
COMMUNITY
Start: 2022-04-10

## 2022-05-04 RX ORDER — SUCRALFATE 1 G/10ML
1 SUSPENSION ORAL 4 TIMES DAILY
Qty: 420 ML | Refills: 5 | Status: SHIPPED | OUTPATIENT
Start: 2022-05-04

## 2022-05-04 RX ORDER — PANCRELIPASE 24000; 76000; 120000 [USP'U]/1; [USP'U]/1; [USP'U]/1
CAPSULE, DELAYED RELEASE PELLETS ORAL
COMMUNITY
Start: 2022-04-20

## 2022-05-04 RX ORDER — LANOLIN ALCOHOL/MO/W.PET/CERES
3 CREAM (GRAM) TOPICAL
COMMUNITY
Start: 2021-06-02

## 2022-05-04 NOTE — PROGRESS NOTES
Subjective:     Bernarda Alvares  is a 35 y.o. female who presents for follow-up about 5 years following laparoscopic gastric bypass surgery. She has lost a total of 80 pounds since surgery. Body mass index is 27.45 kg/m². . EBWL is (76%). The patient presents today to assess their progress toward their goal of weight loss and to address any issues that may be present. Today the patient and I have reviewed their diet and how appropriate their food choices are. The following issues have been identified - she is being treated for gastric ulcers via her GI provider in [place] but has never had an EGD. She states the medications she is taking / took did little to relive her epigastric / right sided pain. She still has her gallbladder and reports a negative ultrasound 2 years ago but no HIDA scan. Weight Loss Metrics 5/4/2022 5/12/2021 4/9/2021 2/26/2020 5/8/2019 10/3/2018 8/6/2018   Today's Wt 150 lb 1.6 oz 139 lb 1 oz 141 lb 8 oz 147 lb 146 lb 4.8 oz 136 lb 140 lb   BMI 27.45 kg/m2 25.43 kg/m2 25.88 kg/m2 26.89 kg/m2 26.76 kg/m2 24.87 kg/m2 25.61 kg/m2     . Surgery related complication: NA - did need laparotomy in summer 2020 by Dr. 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        She reports some RUQ pain with meals and denies vomiting and abdominal pain. Patients pain score:0    Weight Loss Metrics 5/4/2022 5/12/2021 4/9/2021 2/26/2020 5/8/2019 10/3/2018 8/6/2018   Today's Wt 150 lb 1.6 oz 139 lb 1 oz 141 lb 8 oz 147 lb 146 lb 4.8 oz 136 lb 140 lb   BMI 27.45 kg/m2 25.43 kg/m2 25.88 kg/m2 26.89 kg/m2 26.76 kg/m2 24.87 kg/m2 25.61 kg/m2        The patient's exercise level: moderately active.     Changes in her medical history and medications have been reviewed. Patient Active Problem List   Diagnosis Code    Morbid obesity ([facility] E66.01    History of adjustable gastric banding Z98.84    Migraine G43.909    Chronic back pain M54.9, G89.29    AVELINO (stress urinary incontinence, female) N39.3    PCOS (polycystic ovarian syndrome) E28.2    Irregular menses N92.6    GERD (gastroesophageal reflux disease) K21.9    Arthritic-like pain M25.50    Hourglass stricture of stomach K31.2    Intestinal malabsorption K90.9    S/P bariatric surgery Z98.84     Past Medical History:   Diagnosis Date    Arthritic-like pain     Arthritis     osteo    Chronic back pain     GERD (gastroesophageal reflux disease)     History of adjustable gastric banding     placed 2011 / removed 2013 all in [address] Hourglass stricture of stomach     Irregular menses     due to PCOS    Migraine     Morbid obesity ([facility]     Morbid obesity with BMI of 40.0-44.9, adult (HCC)     Nausea & vomiting     severe    PCOS (polycystic ovarian syndrome)     AVELINO (stress urinary incontinence, female)      Past Surgical History:   Procedure Laterality Date    HX GI  2011    placment of gastric band in [facility] [address] HX GI  2013    removal of gastric band in [place] (not original surgeon)    HX HEENT      mulitple tympanic membrane surgeries as a child    HX TONSIL AND ADENOIDECTOMY      HX TUBAL LIGATION       Current Outpatient Medications   Medication Sig Dispense Refill    levocetirizine (XYZAL) 5 mg tablet TAKE 1 TABLET BY MOUTH ONCE DAILY IN THE EVENING      Creon 24,000-76,000 -120,000 unit capsule TAKE TWO CAPSULES BY MOUTH THREE TIMES DAILY WITH MEALS AND ONE CAPSULE THREE TIMES DAILY WITH SNACKS      melatonin 3 mg tablet Take 3 mg by mouth.  ergocalciferol (ERGOCALCIFEROL) 1,250 mcg (50,000 unit) capsule Take 1 cap by mouth once a week for 1 year 52 Cap 1    cholecalciferol (VITAMIN D3) 1,000 unit tablet Take  by mouth daily.       lysine (L-LYSINE) 500 mg tab tablet Take  by mouth three (3) times daily (with meals).  omeprazole (PRILOSEC) 20 mg capsule Take 20 mg by mouth daily.  CALCIUM CITRATE PO Take  by mouth.  cyanocobalamin (VITAMIN B-12) 1,000 mcg sublingual tablet Take 1,000 mcg by mouth daily.  BIOTIN PO Take  by mouth.  VITAMIN B COMPLEX PO Take  by mouth.  L. RHAMNOSUS GG/INULIN (CULTURELLE PROBIOTICS PO) Take  by mouth.  multivitamin with iron (FLINTSTONES) chewable tablet Take 1 Tab by mouth two (2) times a day.           Review of Symptoms:       General - No history or complaints of unexpected fever or chills  Head/Neck - No history or complaints of headache or dizziness  Cardiac - No history or complaints of chest pain, palpitations, or shortness of breath  Pulmonary - No history or complaints of shortness of breath or productive cough  Gastrointestinal - as noted above  Genitourinary - No history or complaints of hematuria/dysuria or renal lithiasis  Musculoskeletal - No history or complaints of joint  muscular weakness  Hematologic - No history of any bleeding episodes  Neurologic - No history or complaints of  migraine headaches or neurologic symptoms                     Objective:     Visit Vitals  BP (!) 98/57   Pulse 67   Temp 97.7 °F (36.5 °C)   Resp 16   Ht 5' 2\" (1.575 m)   Wt 68.1 kg (150 lb 1.6 oz)   SpO2 100%   BMI 27.45 kg/m²         Physical Examination:     General appearance:  alert, cooperative, no distress, appears stated age   Mental status   alert, oriented to person, place, and time   Neck  supple, no significant adenopathy     Chest  clear to auscultation, no wheezes, rales or rhonchi, symmetric air entry   Heart  normal rate, regular rhythm, normal S1, S2, no murmurs, rubs, clicks or gallops    Abdomen: soft, nontender, nondistended, no masses or organomegaly   Incision:  healing well, no drainage, no erythema, no hernia, no seroma, no swelling, no dehiscence, incision well approximated Neurological  alert, oriented, normal speech, no focal findings or movement disorder noted   Extremities peripheral pulses normal, no pedal edema, no clubbing or cyanosis         Lab Results   Component Value Date/Time    WBC 5.3 04/09/2021 12:23 PM    HGB 9.4 (L) 04/09/2021 12:23 PM    HCT 30.7 (L) 04/09/2021 12:23 PM    PLATELET 178 74/33/5759 12:23 PM    MCV 81.4 04/09/2021 12:23 PM     Lab Results   Component Value Date/Time    Sodium 140 04/09/2021 12:23 PM    Potassium 3.8 04/09/2021 12:23 PM    Chloride 106 04/09/2021 12:23 PM    CO2 31 04/09/2021 12:23 PM    Anion gap 3 04/09/2021 12:23 PM    Glucose 78 04/09/2021 12:23 PM    BUN 10 04/09/2021 12:23 PM    Creatinine 0.51 (L) 04/09/2021 12:23 PM    BUN/Creatinine ratio 20 04/09/2021 12:23 PM    GFR est AA >60 04/09/2021 12:23 PM    GFR est non-AA >60 04/09/2021 12:23 PM    Calcium 8.2 (L) 04/09/2021 12:23 PM    Bilirubin, total 0.5 04/09/2021 12:23 PM    Alk. phosphatase 97 04/09/2021 12:23 PM    Protein, total 7.3 04/09/2021 12:23 PM    Albumin 3.6 04/09/2021 12:23 PM    Globulin 3.7 04/09/2021 12:23 PM    A-G Ratio 1.0 04/09/2021 12:23 PM    ALT (SGPT) 27 04/09/2021 12:23 PM     Lab Results   Component Value Date/Time    Iron 60 04/09/2021 12:23 PM    Ferritin 3 (L) 04/09/2021 12:24 PM     Lab Results   Component Value Date/Time    Folate >20.0 (H) 04/09/2021 12:23 PM     Lab Results   Component Value Date/Time    Vitamin D 25-Hydroxy 26.6 (L) 04/09/2021 12:24 PM           Assessment:     1. History of Morbid obesity, status post  laparoscopic gastric bypass surgery. Pain in RUQ from unclear etiology. Her new GI MD is working her up for her symptoms. Probably needs EGD and possibly work her up for gallbladder disease. Plan:     1. Remember to measure portions, continue low carbohydrate diet  2. Continue to concentrate on protein intake meeting daily requirements  3. Remember vitamin supplements.  The importance of such was discussed regarding the malabsorptive issues that the surgery creates. 4. Exercise regimen appears to be: good  5. Try and attend support group if feasible. 6. Follow-up in 6 month(s). 7. Lab ordered today  8. Total time spent with the patient 30 minutes.   9. Start Carafate prn

## 2022-05-09 RX ORDER — ERGOCALCIFEROL 1.25 MG/1
CAPSULE ORAL
Qty: 8 CAPSULE | Refills: 11 | Status: SHIPPED | OUTPATIENT
Start: 2022-05-09

## 2022-05-17 LAB — VIT B1 BLD-SCNC: 115.9 NMOL/L (ref 66.5–200)

## 2022-06-08 ENCOUNTER — TELEPHONE (OUTPATIENT)
Dept: SURGERY | Age: 42
End: 2022-06-08

## 2024-05-08 ENCOUNTER — OFFICE VISIT (OUTPATIENT)
Age: 44
End: 2024-05-08
Payer: COMMERCIAL

## 2024-05-08 VITALS
BODY MASS INDEX: 28.3 KG/M2 | HEART RATE: 59 BPM | HEIGHT: 62 IN | TEMPERATURE: 98.1 F | DIASTOLIC BLOOD PRESSURE: 65 MMHG | WEIGHT: 153.8 LBS | SYSTOLIC BLOOD PRESSURE: 114 MMHG | OXYGEN SATURATION: 96 %

## 2024-05-08 DIAGNOSIS — K90.9 INTESTINAL MALABSORPTION, UNSPECIFIED TYPE: Primary | ICD-10-CM

## 2024-05-08 DIAGNOSIS — Z98.84 S/P BARIATRIC SURGERY: ICD-10-CM

## 2024-05-08 PROCEDURE — 99214 OFFICE O/P EST MOD 30 MIN: CPT | Performed by: SPECIALIST

## 2024-05-08 RX ORDER — ACYCLOVIR 400 MG/1
400 TABLET ORAL 3 TIMES DAILY
COMMUNITY
Start: 2024-03-11

## 2024-05-08 ASSESSMENT — PATIENT HEALTH QUESTIONNAIRE - PHQ9
SUM OF ALL RESPONSES TO PHQ QUESTIONS 1-9: 0
SUM OF ALL RESPONSES TO PHQ QUESTIONS 1-9: 0
1. LITTLE INTEREST OR PLEASURE IN DOING THINGS: NOT AT ALL
SUM OF ALL RESPONSES TO PHQ QUESTIONS 1-9: 0
2. FEELING DOWN, DEPRESSED OR HOPELESS: NOT AT ALL
SUM OF ALL RESPONSES TO PHQ9 QUESTIONS 1 & 2: 0
SUM OF ALL RESPONSES TO PHQ QUESTIONS 1-9: 0

## 2024-05-08 NOTE — PROGRESS NOTES
Diane Rao  is a 43 y.o. female who presents for follow-up about 7 years following laparoscopic gastric bypass surgery.   Surgery related complication: none.  She has lost a total of 80 pounds since surgery.  Body mass index is 28.13 kg/m²..  Loss of EBW 75%.      The patient presents today to assess their progress toward their weight loss goal & to address any issues that may be present:   She is tolerating solids without difficulty, reports no issues and denies vomiting and abdominal pain.    The patients diet choices have been reviewed today and counseling was given.         Taking vitamins as recommended.    The patient's exercise level: very active.      Changes in her medical history and medications have been reviewed.      Pain score today: 0    Comorbidities:    Hypertension: improved  Diabetes: N/A  Obstructive Sleep Apnea: N/A  Hyperlipidemia: improved  Stress Urinary Incontinence: {N/A  Gastroesophageal Reflux: improved  Weight related arthropathy:improved      Patient Active Problem List   Diagnosis    Irregular menses    GERD (gastroesophageal reflux disease)    History of adjustable gastric banding    S/P bariatric surgery    Chronic back pain    Arthritic-like pain    Migraine     Past Medical History:   Diagnosis Date    Arthritic-like pain     Arthritis     osteo    Chronic back pain     GERD (gastroesophageal reflux disease)     History of adjustable gastric banding     placed 2011 / removed 2013 all in [place]    Hourglass stricture of stomach     Irregular menses     due to PCOS    Migraine     Morbid obesity (HCC)     Morbid obesity with BMI of 40.0-44.9, adult (HCC)     Nausea & vomiting     severe    PCOS (polycystic ovarian syndrome)     CAYDEN (stress urinary incontinence, female)      Past Surgical History:   Procedure Laterality Date    GASTRIC BYPASS SURGERY      2017 - [place]     GI  2013    removal of gastric band in [place] (not original surgeon)    GI  2011

## 2025-04-15 NOTE — PROGRESS NOTES
4/19/17- Lap GBP with ASA> 30 mins and Diaphragmatic hernia repair (prior banding pt - not our band pt)  Pre op wt.- 230 lbs.  EBW- 105 lbs.     Subjective:     Diane Rao  is a 44 y.o. female who presents for follow-up about 8 years following gastric bypass. She has lost a total of 74 pounds since surgery.  Body mass index is 28.57 kg/m².. EBWL is (70%). The patient presents today to assess their progress toward their goal of weight loss and to address any issues that may be present.  Today the patient and I have reviewed their diet and how appropriate their food choices are.  The following issues have been identified - none from a surgical standpoint.    She was last seen in May 2024 at (-)80 lbs = 75% wt loss        4/16/2025    10:00 AM 5/8/2024    10:00 AM 4/10/2023    11:17 AM 4/10/2023    11:16 AM 5/4/2022     9:43 AM 5/4/2022     9:38 AM 5/12/2021     2:35 PM   Ambulatory Bariatric Summary   Systolic 101 114 97 95 98 105 102   Diastolic 60 65 56 55 57 54 63   Pulse 66 59 61 60 67 66 62   Temp 97.3 °F (36.3 °C) 98.1 °F (36.7 °C)  98.3 °F (36.8 °C)      Weight - Scale 156.2 153.8  146.9  150.1 139.06   Height 1.575 m (5' 2\") 1.575 m (5' 2\")  1.6 m (5' 3\")  1.575 m (5' 2\") 1.575 m (5' 2\")   BMI 28.6 kg/m2 28.2 kg/m2  26.1 kg/m2  27.5 kg/m2 25.5 kg/m2   Weight - Scale 70.9 kg (156 lb 3.2 oz) 69.8 kg (153 lb 12.8 oz)  66.6 kg (146 lb 14.4 oz)  68.1 kg (150 lb 1.6 oz) 63.1 kg (139 lb 1 oz)   BMI (Calculated) 28.6 28.2  26.1  27.5 25.5     Surgery related complication: NA     She reports no real issues and denies vomiting, abdominal pain, and difficulty breathing.    Patients pain score: 0/10        4/16/2025    10:00 AM 5/8/2024    10:00 AM 4/10/2023    11:17 AM 4/10/2023    11:16 AM 5/4/2022     9:43 AM 5/4/2022     9:38 AM 5/12/2021     2:35 PM   Ambulatory Bariatric Summary   Systolic 101 114 97 95 98 105 102   Diastolic 60 65 56 55 57 54 63   Pulse 66 59 61 60 67 66 62   Temp 97.3 °F (36.3 °C) 98.1 °F

## 2025-04-16 ENCOUNTER — OFFICE VISIT (OUTPATIENT)
Age: 45
End: 2025-04-16

## 2025-04-16 VITALS
HEIGHT: 62 IN | HEART RATE: 66 BPM | BODY MASS INDEX: 28.74 KG/M2 | OXYGEN SATURATION: 100 % | WEIGHT: 156.2 LBS | TEMPERATURE: 97.3 F | SYSTOLIC BLOOD PRESSURE: 101 MMHG | DIASTOLIC BLOOD PRESSURE: 60 MMHG

## 2025-04-16 DIAGNOSIS — Z98.84 S/P BARIATRIC SURGERY: ICD-10-CM

## 2025-04-16 DIAGNOSIS — K90.9 INTESTINAL MALABSORPTION, UNSPECIFIED TYPE: Primary | ICD-10-CM

## (undated) DEVICE — ENDO CARRY-ON PROCEDURE KIT INCLUDES ENZYMATIC SPONGE, GAUZE, BIOHAZARD LABEL, TRAY, LUBRICANT, DIRTY SCOPE LABEL, WATER LABEL, TRAY, DRAWSTRING PAD, AND DEFENDO 4-PIECE KIT.: Brand: ENDO CARRY-ON PROCEDURE KIT

## (undated) DEVICE — DEVON™ KNEE AND BODY STRAP 60" X 3" (1.5 M X 7.6 CM): Brand: DEVON

## (undated) DEVICE — 3L THIN WALL CAN: Brand: CRD

## (undated) DEVICE — SYR 50ML SLIP TIP NSAF LF STRL --

## (undated) DEVICE — SYR IRR CATH TIP LR ADPT 70ML -- CONVERT TO ITEM 363120

## (undated) DEVICE — STERILE POLYISOPRENE POWDER-FREE SURGICAL GLOVES: Brand: PROTEXIS

## (undated) DEVICE — REM POLYHESIVE ADULT PATIENT RETURN ELECTRODE: Brand: VALLEYLAB

## (undated) DEVICE — RELOAD STPL L60MM H1.5-3.6MM REG TISS BLU GRIPPING SURF B

## (undated) DEVICE — VISUALIZATION SYSTEM: Brand: CLEARIFY

## (undated) DEVICE — (D)GLOVE SURG TRIFLX 7.5 PWD -- DISC BY MFR USE ITEM 102005

## (undated) DEVICE — CLIP SUT ENDOSCP F/2-0/3-0/4-0 -- LAPRA-TY

## (undated) DEVICE — AGENT HEMSTAT W6XL9IN OXIDIZED REGENERATED CELOS ABSRB FOR

## (undated) DEVICE — AIRLIFE™ NASAL OXYGEN CANNULA CURVED, FLARED TIP WITH 14 FOOT (4.3 M) CRUSH-RESISTANT TUBING, OVER-THE-EAR STYLE: Brand: AIRLIFE™

## (undated) DEVICE — PREP SKN PREVAIL 40ML APPL --

## (undated) DEVICE — TROCAR ENDOSCP L100MM DIA15MM BLDELSS STBL SL ENDOPATH XCEL

## (undated) DEVICE — DRAIN SURG 15FR L3/16IN SIL RND 3/4 FLUT 3/16IN TRCR

## (undated) DEVICE — SUT MONOCRYL PLUS UD 4-0 --

## (undated) DEVICE — SYR 3ML LL TIP 1/10ML GRAD --

## (undated) DEVICE — TRNQT TEXT 1X18IN BLU LF DISP -- CONVERT TO ITEM 362165

## (undated) DEVICE — AGENT HEMSTAT W4XL4IN OXIDIZED REGENERATED CELOS STRUCTURED

## (undated) DEVICE — DISPOSABLE SUCTION/IRRIGATOR TUBE SET WITH TIP: Brand: AHTO

## (undated) DEVICE — DERMABOND SKIN ADH 0.7ML -- DERMABOND ADVANCED 12/BX

## (undated) DEVICE — GOWN,SIRUS,NONRNF,SETINSLV,XL,20/CS: Brand: MEDLINE

## (undated) DEVICE — BRUSH CYTO L240CM DIA2.3MM GI COLONOSCOPY 3 RNG HNDL RADPQ

## (undated) DEVICE — THIS PRODUCT IS SINGLE USE AND INTENDED TO BE USED FOR BLUNT DISSECTION OF TISSUE.: Brand: ASPEN® ENDOSCOPIC KITTNER, SINGLE TIP

## (undated) DEVICE — AMD ANTIMICROBIAL DRAIN SPONGES, 6 PLY, 0.2% POLYHEXAMETHYLENE BIGUANIDE HCI (PHMB): Brand: EXCILON

## (undated) DEVICE — TIP APPL L35CM RIG FOR SEAL EVICEL

## (undated) DEVICE — TRAY CATH OD16FR SIL URIN M STATLOK STBL DEV SURSTP

## (undated) DEVICE — CATH IV SAFE STR 22GX1IN BLU -- PROTECTIV PLUS

## (undated) DEVICE — NDL PRT INJ NSAF BLNT 18GX1.5 --

## (undated) DEVICE — STAPLER INT L37CM STPL 21MM CIR ENDOSCP CRV INTLUMN B FRM

## (undated) DEVICE — MAJ-1414 SINGLE USE ADPATER BIOPSY VALV: Brand: SINGLE USE ADAPTOR BIOPSY VALVE

## (undated) DEVICE — RELOAD STPL L60MM H1-2.6MM MESENTERY THN TISS WHT 6 ROW

## (undated) DEVICE — DRAPE TWL SURG 16X26IN BLU ORB04] ALLCARE INC]

## (undated) DEVICE — TROCAR ENDOSCP BLDELSS 12X100 MM W/ HNDL STBL SL OPT TIP

## (undated) DEVICE — KENDALL SCD EXPRESS SLEEVES, KNEE LENGTH, MEDIUM: Brand: KENDALL SCD

## (undated) DEVICE — ENDOCUT SCISSOR TIP, DISPOSABLE: Brand: RENEW

## (undated) DEVICE — GOWN ISOLATN REG BLU POLY UNISX W/ THMB LOOP

## (undated) DEVICE — SOLUTION LACTATED RINGERS INJECTION USP

## (undated) DEVICE — TROCAR ENDOSCP L100MM DIA12MM STBL SL BLDELSS ENDOPATH XCEL

## (undated) DEVICE — FORCEPS BX CAP 240CM L RAD JAW 4

## (undated) DEVICE — SHEAR HARMONIC 5MMX45CM -- ACE 7+

## (undated) DEVICE — SET ADMIN 16ML TBNG L100IN 2 Y INJ SITE IV PIGGY BK DISP

## (undated) DEVICE — RELOAD STPL H1-2.5X45MM VASC THN TISS WHT 6 ROW B FRM SGL

## (undated) DEVICE — SEALANT HEMSTAT 5ML HUM FIBRIN THROM 2 VI APPL DEV EVICEL

## (undated) DEVICE — 4-PORT MANIFOLD: Brand: NEPTUNE 2

## (undated) DEVICE — TISSUE RETRIEVAL SYSTEM: Brand: INZII RETRIEVAL SYSTEM

## (undated) DEVICE — APPLIER CLP L SHFT DIA12MM 20 ROT MULT LIGACLP

## (undated) DEVICE — EJECTOR SALIVA 6 IN FLX CLR

## (undated) DEVICE — (D)PACK STD BSHR BARIATRIC -- DISC BY MFR USE ITEM 338832

## (undated) DEVICE — MEDI-VAC NON-CONDUCTIVE SUCTION TUBING: Brand: CARDINAL HEALTH

## (undated) DEVICE — KENDALL RADIOLUCENT FOAM MONITORING ELECTRODE RECTANGULAR SHAPE: Brand: KENDALL

## (undated) DEVICE — FORCEPS BX OVL CUP SERR DISP CAP L 240CM RAD JAW 4

## (undated) DEVICE — SUT PROL 2-0 30IN CT2 BLU --

## (undated) DEVICE — Device

## (undated) DEVICE — CATHETER JEJUSTMY AD 16FR 15CC L108IN THMB VLV FOR DCOMPR

## (undated) DEVICE — MOUTHPIECE ENDOSCP 20X27MM --

## (undated) DEVICE — SUTURE ETHLN SZ 3-0 L30IN NONABSORBABLE BLK FSL L30MM 3/8 1671H

## (undated) DEVICE — SUTURE PDS II SZ 0 L27IN ABSRB VLT L26MM CT-2 1/2 CIR Z334H

## (undated) DEVICE — COVADERM PLUS: Brand: DEROYAL

## (undated) DEVICE — CUTTER ENDOSCP L340MM LIN ARTC SGL STROKE FIRING ENDOPATH

## (undated) DEVICE — STAPLER SKIN L440MM 32MM LNG 12 FIRING B FRM PWR + GRIPPING

## (undated) DEVICE — ALL PURPOSE SPONGES,NON-WOVEN, 4 PLY: Brand: CURITY

## (undated) DEVICE — TRAP SPEC COLL POLYP POLYSTYR --

## (undated) DEVICE — SINGLE PORT MANIFOLD: Brand: NEPTUNE 2

## (undated) DEVICE — SUTURE ETHIB EXCL BR GRN TAPR PT 2-0 30 X563H X563H

## (undated) DEVICE — SOL IRRIGATION INJ NACL 0.9% 500ML BTL

## (undated) DEVICE — TROCAR LAP L100MM DIA5MM BLDELSS W/ STBL SL ENDOPATH XCEL

## (undated) DEVICE — NEEDLE INSUF L150MM DIA2MM DISP FOR PNEUMOPERI ENDOPATH